# Patient Record
Sex: MALE | Race: WHITE | Employment: FULL TIME | URBAN - METROPOLITAN AREA
[De-identification: names, ages, dates, MRNs, and addresses within clinical notes are randomized per-mention and may not be internally consistent; named-entity substitution may affect disease eponyms.]

---

## 2017-05-22 DIAGNOSIS — F33.1 MAJOR DEPRESSIVE DISORDER, RECURRENT EPISODE, MODERATE (H): ICD-10-CM

## 2017-05-23 NOTE — TELEPHONE ENCOUNTER
Wellbutrin XL        Last Written Prescription Date: 08/17/2016  Last Fill Quantity: 90; # refills: 1  Last Office Visit with [facility], [facility] or [facility] prescribing provider:  11/07/2016        Last PHQ-9 score on record=   PHQ-9 SCORE 8/17/2016   Total Score -   Total Score 4       No results found for: AST  No results found for: ALT    Zoloft     Last Written Prescription Date: 08/17/2016  Last Fill Quantity: 90, # refills: 1  Last Office Visit with [facility] primary care provider:  11/07/2016        Last PHQ-9 score on record=   PHQ-9 SCORE 8/17/2016   Total Score -   Total Score 4

## 2017-05-26 RX ORDER — BUPROPION HYDROCHLORIDE 150 MG/1
TABLET ORAL
Qty: 30 TABLET | Refills: 0 | Status: SHIPPED | OUTPATIENT
Start: 2017-05-26 | End: 2017-07-07

## 2017-05-26 RX ORDER — SERTRALINE HYDROCHLORIDE 100 MG/1
TABLET, FILM COATED ORAL
Qty: 30 TABLET | Refills: 0 | Status: SHIPPED | OUTPATIENT
Start: 2017-05-26 | End: 2017-07-05

## 2017-05-26 NOTE — TELEPHONE ENCOUNTER
CW,  Left  #2 for patient   See below messages  No response from our outreach  Please advise on refill  Shu ALEXANDER RN

## 2017-06-09 DIAGNOSIS — G47.33 OSA (OBSTRUCTIVE SLEEP APNEA): Primary | ICD-10-CM

## 2017-07-05 DIAGNOSIS — F33.1 MAJOR DEPRESSIVE DISORDER, RECURRENT EPISODE, MODERATE (H): ICD-10-CM

## 2017-07-05 RX ORDER — BUPROPION HYDROCHLORIDE 150 MG/1
TABLET ORAL
Qty: 30 TABLET | Refills: 0 | Status: CANCELLED | OUTPATIENT
Start: 2017-07-05

## 2017-07-05 NOTE — TELEPHONE ENCOUNTER
Zoloft      Last Written Prescription Date: 05/26/2017  Last Fill Quantity: 30, # refills: 0  Last Office Visit with [facility] primary care provider:  11/07/2016        Last PHQ-9 score on record=   PHQ-9 SCORE 8/17/2016   Total Score -   Total Score 4               Wellbutrin        Last Written Prescription Date: 05/26/2017  Last Fill Quantity: 30; # refills: 0  Last Office Visit with [facility], [facility] or [facility] prescribing provider:  11/07/2016        Last PHQ-9 score on record=   PHQ-9 SCORE 8/17/2016   Total Score -   Total Score 4       No results found for: AST  No results found for: ALT

## 2017-07-07 RX ORDER — SERTRALINE HYDROCHLORIDE 100 MG/1
TABLET, FILM COATED ORAL
Qty: 30 TABLET | Refills: 0 | Status: SHIPPED | OUTPATIENT
Start: 2017-07-07 | End: 2017-09-01

## 2017-07-07 RX ORDER — BUPROPION HYDROCHLORIDE 150 MG/1
TABLET ORAL
Qty: 30 TABLET | Refills: 0 | Status: SHIPPED | OUTPATIENT
Start: 2017-07-07 | End: 2017-09-01

## 2017-07-07 NOTE — TELEPHONE ENCOUNTER
CW,  Please see below messages  Left VM #2 for pt  Pt has not responded to our outreach  Please advise on further refill  Shu ALEXANDER RN

## 2017-09-01 DIAGNOSIS — F33.1 MAJOR DEPRESSIVE DISORDER, RECURRENT EPISODE, MODERATE (H): ICD-10-CM

## 2017-09-01 NOTE — TELEPHONE ENCOUNTER
Left non detailed VM for pt asking that they callback and schedule (physical with PCP)  Shu ALEXANDER RN    Pending Prescriptions:                       Disp   Refills    buPROPion (WELLBUTRIN XL) 150 MG 24 hr tab*30 tab*0        Sig: TAKE 1 TABLET (150 MG) BY MOUTH EVERY MORNING - NEEDS           APPOINTMENT    sertraline (ZOLOFT) 100 MG tablet [Pharmac*30 tab*0        Sig: TAKE 1 TABLET (100 MG) BY MOUTH DAILY - NEEDS           APPOINTMENT         Last Written Prescription Date: 7/7/2017  Last Fill Quantity: 30 days; # refills: 0  Last Office Visit with [facility], [facility] or [facility] prescribing provider:  11/7/2016 with SN        Last PHQ-9 score on record=   PHQ-9 SCORE 8/17/2016   Total Score -   Total Score 4       No results found for: AST  No results found for: ALT

## 2017-09-07 RX ORDER — SERTRALINE HYDROCHLORIDE 100 MG/1
TABLET, FILM COATED ORAL
Qty: 30 TABLET | Refills: 0 | Status: SHIPPED | OUTPATIENT
Start: 2017-09-07 | End: 2017-11-27

## 2017-09-07 RX ORDER — BUPROPION HYDROCHLORIDE 150 MG/1
TABLET ORAL
Qty: 30 TABLET | Refills: 0 | Status: SHIPPED | OUTPATIENT
Start: 2017-09-07 | End: 2017-11-27

## 2017-09-07 NOTE — TELEPHONE ENCOUNTER
CW,  Left  #2 for patient  See below messages  No response from patient to our outreach  Please advise on further refill  Thanks,  Shu ALEXANDER RN

## 2017-09-07 NOTE — TELEPHONE ENCOUNTER
Sent in #30 of each medication.  Way overdue for appt.  Appreciate trying to call him.  Would send in #20 of the rx if he still hasn't made appt with next request- then would refuse.  Thanks- CW

## 2017-11-27 DIAGNOSIS — F33.1 MAJOR DEPRESSIVE DISORDER, RECURRENT EPISODE, MODERATE (H): ICD-10-CM

## 2017-11-29 RX ORDER — SERTRALINE HYDROCHLORIDE 100 MG/1
TABLET, FILM COATED ORAL
Qty: 30 TABLET | Refills: 0 | Status: SHIPPED | OUTPATIENT
Start: 2017-11-29 | End: 2017-12-01

## 2017-11-29 RX ORDER — BUPROPION HYDROCHLORIDE 150 MG/1
TABLET ORAL
Qty: 30 TABLET | Refills: 0 | Status: SHIPPED | OUTPATIENT
Start: 2017-11-29 | End: 2017-12-01

## 2017-11-29 NOTE — TELEPHONE ENCOUNTER
Routing refill request to provider for review/approval because:  A break in medication - should have run out mid October  Is scheduled with you for Friday  Ok to address then?  hSu ALEXANDER RN    Requested Prescriptions   Pending Prescriptions Disp Refills     buPROPion (WELLBUTRIN XL) 150 MG 24 hr tablet [Pharmacy Med Name: BUPROPION HCL  MG TABLET] 30 tablet 0     Sig: TAKE 1 TABLET (150 MG) BY MOUTH EVERY MORNING - NEEDS APPOINTMENT    SSRIs Protocol Failed    11/27/2017 10:14 AM       Failed - PHQ-9 score less than 5 in past 6 months    Please review PHQ-9 score.          Failed - Medication is NOT Bupropion    If the medication is Bupropion (Wellbutrin), and the patient is taking for smoking cessation; OK to refill.         Failed - Recent (6 mo) or future visit with authorizing provider's specialty    Patient had office visit in the last 6 months or has a visit in the next 30 days with authorizing provider.  See chart review.            Passed - Patient is age 18 or older        sertraline (ZOLOFT) 100 MG tablet [Pharmacy Med Name: SERTRALINE  MG TABLET] 30 tablet 0     Sig: TAKE 1 TABLET (100 MG) BY MOUTH DAILY - NEEDS APPOINTMENT    SSRIs Protocol Failed    11/27/2017 10:14 AM       Failed - PHQ-9 score less than 5 in past 6 months    Please review PHQ-9 score.          Failed - Medication is NOT Bupropion    If the medication is Bupropion (Wellbutrin), and the patient is taking for smoking cessation; OK to refill.         Failed - Recent (6 mo) or future visit with authorizing provider's specialty    Patient had office visit in the last 6 months or has a visit in the next 30 days with authorizing provider.  See chart review.            Passed - Patient is age 18 or older        Next 5 appointments (look out 90 days)     Dec 01, 2017  2:00 PM CST   PHYSICAL with Neida Benz MD   [facility]    [facility] [id number]    [phone]

## 2017-12-01 ENCOUNTER — OFFICE VISIT (OUTPATIENT)
Dept: FAMILY MEDICINE | Facility: CLINIC | Age: 42
End: 2017-12-01
Payer: COMMERCIAL

## 2017-12-01 VITALS
BODY MASS INDEX: 33.54 KG/M2 | HEART RATE: 89 BPM | WEIGHT: 247.6 LBS | OXYGEN SATURATION: 97 % | SYSTOLIC BLOOD PRESSURE: 143 MMHG | HEIGHT: 72 IN | DIASTOLIC BLOOD PRESSURE: 82 MMHG | TEMPERATURE: 97.4 F

## 2017-12-01 DIAGNOSIS — F33.1 MAJOR DEPRESSIVE DISORDER, RECURRENT EPISODE, MODERATE (H): ICD-10-CM

## 2017-12-01 DIAGNOSIS — N52.2 DRUG-INDUCED ERECTILE DYSFUNCTION: ICD-10-CM

## 2017-12-01 DIAGNOSIS — Z13.6 CARDIOVASCULAR SCREENING; LDL GOAL LESS THAN 160: ICD-10-CM

## 2017-12-01 DIAGNOSIS — Z23 FLU VACCINE NEED: ICD-10-CM

## 2017-12-01 DIAGNOSIS — G47.33 OSA (OBSTRUCTIVE SLEEP APNEA): ICD-10-CM

## 2017-12-01 DIAGNOSIS — R03.0 ELEVATED BLOOD PRESSURE READING WITHOUT DIAGNOSIS OF HYPERTENSION: ICD-10-CM

## 2017-12-01 DIAGNOSIS — Z00.00 ENCOUNTER FOR ROUTINE ADULT HEALTH EXAMINATION WITHOUT ABNORMAL FINDINGS: Primary | ICD-10-CM

## 2017-12-01 PROCEDURE — 90686 IIV4 VACC NO PRSV 0.5 ML IM: CPT | Performed by: FAMILY MEDICINE

## 2017-12-01 PROCEDURE — 90471 IMMUNIZATION ADMIN: CPT | Performed by: FAMILY MEDICINE

## 2017-12-01 PROCEDURE — 99396 PREV VISIT EST AGE 40-64: CPT | Mod: 25 | Performed by: FAMILY MEDICINE

## 2017-12-01 RX ORDER — BUPROPION HYDROCHLORIDE 150 MG/1
TABLET ORAL
Qty: 90 TABLET | Refills: 1 | Status: SHIPPED | OUTPATIENT
Start: 2017-12-01 | End: 2018-12-19

## 2017-12-01 RX ORDER — SERTRALINE HYDROCHLORIDE 100 MG/1
100 TABLET, FILM COATED ORAL DAILY
Qty: 90 TABLET | Refills: 1 | Status: SHIPPED | OUTPATIENT
Start: 2017-12-01 | End: 2018-11-14

## 2017-12-01 ASSESSMENT — PATIENT HEALTH QUESTIONNAIRE - PHQ9: SUM OF ALL RESPONSES TO PHQ QUESTIONS 1-9: 9

## 2017-12-01 NOTE — MR AVS SNAPSHOT
After Visit Summary   12/1/2017    Vahe Haas    MRN: [id number]           Patient Information     Date Of Birth          1975        Visit Information        Provider Department      12/1/2017 2:00 PM Neida Benz MD [facility]        Today's Diagnoses     Encounter for routine adult health examination without abnormal findings    -  1    Major depressive disorder, recurrent episode, moderate (H)        Flu vaccine need        Moderate major depression        Elevated blood pressure reading without diagnosis of hypertension        CARDIOVASCULAR SCREENING; LDL GOAL LESS THAN 160        Drug-induced erectile dysfunction        JNAA (obstructive sleep apnea)          Care Instructions      Preventive Health Recommendations  Male Ages 40 to 49    Yearly exam:             See your health care provider every year in order to  o   Review health changes.   o   Discuss preventive care.    o   Review your medicines if your doctor has prescribed any.    You should be tested each year for STDs (sexually transmitted diseases) if you re at risk.     Have a cholesterol test every 5 years.     Have a colonoscopy (test for colon cancer) if someone in your family has had colon cancer or polyps before age 50.     After age 45, have a diabetes test (fasting glucose). If you are at risk for diabetes, you should have this test every 3 years.      Talk with your health care provider about whether or not a prostate cancer screening test (PSA) is right for you.    Shots: Get a flu shot each year. Get a tetanus shot every 10 years.     Nutrition:    Eat at least 5 servings of fruits and vegetables daily.     Eat whole-grain bread, whole-wheat pasta and brown rice instead of white grains and rice.     Talk to your provider about Calcium and Vitamin D.     Lifestyle    Exercise for at least 150 minutes a week (30 minutes a day, 5 days a week). This will help you control your weight and prevent  disease.     Limit alcohol to one drink per day.     No smoking.     Wear sunscreen to prevent skin cancer.     See your dentist every six months for an exam and cleaning.              Follow-ups after your visit        Future tests that were ordered for you today     Open Future Orders        Priority Expected Expires Ordered    Lipid panel reflex to direct LDL Fasting Routine 6/1/2018 11/1/2018 12/1/2017    Basic metabolic panel  (Ca, Cl, CO2, Creat, Gluc, K, Na, BUN) Routine 6/1/2018 11/1/2018 12/1/2017    Albumin Random Urine Quantitative with Creat Ratio Routine 6/1/2018 11/1/2018 12/1/2017            Who to contact     If you have questions or need follow up information about today's clinic visit or your schedule please contact [facility] directly at [phone].  Normal or non-critical lab and imaging results will be communicated to you by [employer], letter or phone within 4 business days after the clinic has received the results. If you do not hear from us within 7 days, please contact the clinic through [employer] or phone. If you have a critical or abnormal lab result, we will notify you by phone as soon as possible.  Submit refill requests through [employer] or call your pharmacy and they will forward the refill request to us. Please allow 3 business days for your refill to be completed.          Additional Information About Your Visit        [employer] Information     [employer] gives you secure access to your electronic health record. If you see a primary care provider, you can also send messages to your care team and make appointments. If you have questions, please call your primary care clinic.  If you do not have a primary care provider, please call [phone] and they will assist you.        Care EveryWhere ID     This is your Care EveryWhere ID. This could be used by other organizations to access your [facility] medical records  [id number]        Your Vitals Were     Pulse Temperature Height Pulse  Oximetry BMI (Body Mass Index)       89 97.4  F (36.3  C) (Oral) 6' (1.829 m) 97% 33.58 kg/m2        Blood Pressure from Last 3 Encounters:   12/01/17 143/82   11/07/16 128/74   08/17/16 116/88    Weight from Last 3 Encounters:   12/01/17 247 lb 9.6 oz (112.3 kg)   11/07/16 244 lb 8 oz (110.9 kg)   08/17/16 233 lb (105.7 kg)              We Performed the Following     HC FLU VAC PRESRV FREE QUAD SPLIT VIR 3+YRS IM          Today's Medication Changes          These changes are accurate as of: 12/1/17  2:40 PM.  If you have any questions, ask your nurse or doctor.               These medicines have changed or have updated prescriptions.        Dose/Directions    buPROPion 150 MG 24 hr tablet   Commonly known as:  WELLBUTRIN XL   This may have changed:  See the new instructions.   Used for:  Major depressive disorder, recurrent episode, moderate (H)   Changed by:  Neida Benz MD        TAKE 1 TABLET (150 MG) BY MOUTH EVERY MORNING - NEEDS APPOINTMENT   Quantity:  90 tablet   Refills:  1       sertraline 100 MG tablet   Commonly known as:  ZOLOFT   This may have changed:  See the new instructions.   Used for:  Major depressive disorder, recurrent episode, moderate (H)   Changed by:  Neida Benz MD        Dose:  100 mg   Take 1 tablet (100 mg) by mouth daily   Quantity:  90 tablet   Refills:  1            Where to get your medicines      These medications were sent to [place] [address] [facility] [place] - [address]  [address] [place] [address]     Phone:  [phone]     buPROPion 150 MG 24 hr tablet    sertraline 100 MG tablet                Primary Care Provider Office Phone # Fax #    Neida Benz [phone] [address] [address]        Equal Access to Services     [facility] KARENA : Demetria Borjas, wavelda luqadaha, qaybta kaalsnehal avila. [facility] [phone].    ATENCIÓN:  Si habla lisa, tiene a alvarado disposición servicios gratuitos de asistencia lingüística. Abel noyola [phone].    We comply with applicable federal civil rights laws and [place] laws. We do not discriminate on the basis of race, color, national origin, age, disability, sex, sexual orientation, or gender identity.            Thank you!     Thank you for choosing [facility]  for your care. Our goal is always to provide you with excellent care. Hearing back from our patients is one way we can continue to improve our services. Please take a few minutes to complete the written survey that you may receive in the mail after your visit with us. Thank you!             Your Updated Medication List - Protect others around you: Learn how to safely use, store and throw away your medicines at www.disposemymeds.org.          This list is accurate as of: 12/1/17  2:40 PM.  Always use your most recent med list.                   Brand Name Dispense Instructions for use Diagnosis    ALLERGEN IMMUNOTHERAPY PRESCRIPTION           buPROPion 150 MG 24 hr tablet    WELLBUTRIN XL    90 tablet    TAKE 1 TABLET (150 MG) BY MOUTH EVERY MORNING - NEEDS APPOINTMENT    Major depressive disorder, recurrent episode, moderate (H)       FLONASE 50 MCG/ACT spray   Generic drug:  fluticasone      Spray 2 sprays into both nostrils as needed.        order for DME      AIRSENSE 10 7-12 CM/H20 NASAL WISP        sertraline 100 MG tablet    ZOLOFT    90 tablet    Take 1 tablet (100 mg) by mouth daily    Major depressive disorder, recurrent episode, moderate (H)       tadalafil 10 MG tablet    CIALIS    12 tablet    Take 0.5-1 tablets (5-10 mg) by mouth daily as needed for erectile dysfunction Never use with nitroglycerin, terazosin or doxazosin.    Drug-induced erectile dysfunction

## 2017-12-01 NOTE — PROGRESS NOTES
SUBJECTIVE:   CC: Vahe Haas is an 42 year old male who presents for preventative health visit.     Healthy Habits:    Do you get at least three servings of calcium containing foods daily (dairy, green leafy vegetables, etc.)? yes    Amount of exercise or daily activities, outside of work: none    Problems taking medications regularly No    Medication side effects: Yes - sexual se's    Have you had an eye exam in the past two years? no    Do you see a dentist twice per year? yes    Do you have sleep apnea, excessive snoring or daytime drowsiness?yes    Stressed lately-   MIL went went in for brain surgery- brain aneurysm operation- 9 hr surgery.   That was ~3 wks ago, doing better now.    Wife got new job, really likes it, and got a 50% raise.  His work- project he's worked on x 3 yrs, went live this morning, stressful.   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  Very much likes his job overall.    MDD- PHQ-9 is 9 today.  Likely worse due to recent stressors, but feels like the wellbutrin XL 150mg/d and zoloft 100mg/d are working well, no se's.  Would like to continue on both at same doses.    PHQ-9 SCORE 2/17/2016 8/17/2016 12/1/2017   Total Score - - -   Total Score 6 4 9     Weight- going up, ~30 lbs in the past 2-3 yrs.    --Pt states that in '15, around time that work moved, and now has less movement during day- sitting next to people now, less walking to talk to people, and used to climb 8 flights of stairs to work- now just one level.  --Zoloft started in 6/13- wt up - wt hovered around 215 lbs until 5/15, so doubtful wt gain is from the zoloft.  --Diet- doesn't think it's changed much.  Lots of skipping meals (breakfast and lunch), going out for dinner.  Sometimes skips lunch, then goes home and eats a dinner.  Doesn't snack much.  Breakfast- coffee  Lunch- often skips  Dinner- usually eats out. 2x/wk- teo, convention grill, or make pasta, or order chinese food (lo mein).    Snacks- rare- not really at work or  in evening.      Today's PHQ-2 Score:   PHQ-2 ( 1999 Pfizer) 12/1/2017 11/7/2016   Q1: Little interest or pleasure in doing things 1 0   Q2: Feeling down, depressed or hopeless 1 0   PHQ-2 Score 2 0     Abuse: Current or Past(Physical, Sexual or Emotional)- No  Do you feel safe in your environment - Yes    Social History   Substance Use Topics     Smoking status: Never Smoker     Smokeless tobacco: Never Used     Alcohol use 0.0 oz/week     0 Standard drinks or equivalent per week      Comment: social, 0-2 times per week     2 beers a week    Last PSA: No results found for: PSA    Reviewed orders with patient. Reviewed health maintenance and updated orders accordingly - Yes  Labs reviewed in EPIC  BP Readings from Last 3 Encounters:   12/01/17 143/82   11/07/16 128/74   08/17/16 116/88    Wt Readings from Last 3 Encounters:   12/01/17 247 lb 9.6 oz (112.3 kg)   11/07/16 244 lb 8 oz (110.9 kg)   08/17/16 233 lb (105.7 kg)                  Patient Active Problem List   Diagnosis     Allergic rhinitis     Insomnia     CARDIOVASCULAR SCREENING; LDL GOAL LESS THAN 160     Moderate major depression     Drug-induced erectile dysfunction     JANA (obstructive sleep apnea)     Acute bilateral thoracic back pain     Acute bilateral low back pain without sciatica     Past Surgical History:   Procedure Laterality Date     HC TOOTH EXTRACTION W/FORCEP  2003       Social History   Substance Use Topics     Smoking status: Never Smoker     Smokeless tobacco: Never Used     Alcohol use 0.0 oz/week     0 Standard drinks or equivalent per week      Comment: social, 0-2 times per week     Family History   Problem Relation Age of Onset     Hypertension Mother      Hypertension Maternal Grandmother      Hypertension Paternal Grandfather      CEREBROVASCULAR DISEASE Maternal Grandmother      Breast Cancer Paternal Grandmother      Allergies Mother      Allergies Father      Allergies Sister      Allergies Maternal Grandmother       Arthritis Maternal Grandmother      Depression Paternal Grandfather      Depression Mother      GASTROINTESTINAL DISEASE Paternal Grandfather      ulcers     Lipids Mother      Lipids Father      Lipids Maternal Grandmother      Lipids Paternal Grandfather      Musculoskeletal Disorder Mother      OSTEOPOROSIS Mother      OSTEOPOROSIS Maternal Grandmother      Respiratory Mother      asthma     Respiratory Father      asthma     Respiratory Sister      asthma     Thyroid Disease Sister          Current Outpatient Prescriptions   Medication Sig Dispense Refill     buPROPion (WELLBUTRIN XL) 150 MG 24 hr tablet TAKE 1 TABLET (150 MG) BY MOUTH EVERY MORNING - NEEDS APPOINTMENT 90 tablet 1     sertraline (ZOLOFT) 100 MG tablet Take 1 tablet (100 mg) by mouth daily 90 tablet 1     order for DME AIRSENSE 10  7-12 CM/H20  NASAL WISP       tadalafil (CIALIS) 10 MG tablet Take 0.5-1 tablets (5-10 mg) by mouth daily as needed for erectile dysfunction Never use with nitroglycerin, terazosin or doxazosin. 12 tablet 3     fluticasone (FLONASE) 50 MCG/ACT nasal spray Spray 2 sprays into both nostrils as needed.       ALLERGY SHOTS        Allergies   Allergen Reactions     Seasonal Allergies      Recent Labs   Lab Test  02/17/16   1202  02/13/13   1116  03/16/12   1113  12/23/10   1442   LDL  124*  122  145*   --    HDL  30*  29*  34*   --    TRIG  167*  98  67   --    CR  1.02  1.03   --    --    GFRESTIMATED  81  82   --    --    GFRESTBLACK  >90   GFR Calc    >90   --    --    POTASSIUM  3.9  3.9   --    --    TSH   --    --    --   1.19        Reviewed and updated as needed this visit by clinical staffTobacco  Allergies  Meds  Problems         Reviewed and updated as needed this visit by Provider  Allergies  Meds  Problems              ROS:  C: NEGATIVE for fever, chills, change in weight  I: NEGATIVE for worrisome rashes, moles or lesions  E: NEGATIVE for vision changes or irritation  ENT: NEGATIVE for  ear, mouth and throat problems  R: NEGATIVE for significant cough or SOB  CV: NEGATIVE for chest pain, palpitations or peripheral edema  GI: NEGATIVE for nausea, abdominal pain, heartburn, or change in bowel habits   male: negative for dysuria, hematuria, decreased urinary stream, erectile dysfunction, urethral discharge  M: NEGATIVE for significant arthralgias or myalgia  N: NEGATIVE for weakness, dizziness or paresthesias  P: NEGATIVE for changes in mood or affect    OBJECTIVE:   /82  Pulse 89  Temp 97.4  F (36.3  C) (Oral)  Ht 6' (1.829 m)  Wt 247 lb 9.6 oz (112.3 kg)  SpO2 97%  BMI 33.58 kg/m2   134/84  EXAM:  GENERAL: healthy, alert and no distress  EYES: Eyes grossly normal to inspection, PERRL and conjunctivae and sclerae normal  HENT: ear canals and TM's normal, nose and mouth without ulcers or lesions  NECK: no adenopathy, no asymmetry, masses, or scars and thyroid normal to palpation  RESP: lungs clear to auscultation - no rales, rhonchi or wheezes  BREAST: normal without masses, tenderness or nipple discharge and no palpable axillary masses or adenopathy  CV: regular rate and rhythm, normal S1 S2, no S3 or S4, no murmur, click or rub, no peripheral edema and peripheral pulses strong  ABDOMEN: soft, nontender, no hepatosplenomegaly, no masses and bowel sounds normal  MS: no gross musculoskeletal defects noted, no edema  SKIN: no suspicious lesions or rashes  NEURO: Normal strength and tone, mentation intact and speech normal  PSYCH: mentation appears normal, affect normal/bright    ASSESSMENT/PLAN:       ICD-10-CM    1. Encounter for routine adult health examination without abnormal findings Z00.00    2. Major depressive disorder, recurrent episode, moderate (H) F33.1 buPROPion (WELLBUTRIN XL) 150 MG 24 hr tablet     sertraline (ZOLOFT) 100 MG tablet   3. BMI 33.0-33.9,adult Z68.33    4. Elevated blood pressure reading without diagnosis of hypertension R03.0 Basic metabolic panel  (Ca, Cl,  CO2, Creat, Gluc, K, Na, BUN)     Albumin Random Urine Quantitative with Creat Ratio   5. JANA (obstructive sleep apnea) G47.33    6. Drug-induced erectile dysfunction N52.2    7. Flu vaccine need Z23 HC FLU VAC PRESRV FREE QUAD SPLIT VIR 3+YRS IM   8. CARDIOVASCULAR SCREENING; LDL GOAL LESS THAN 160 Z13.6 Lipid panel reflex to direct LDL Fasting     CPE- Discussed diet, calcium and exercise.  Eyes and Teeth or UTD or recommended f/u.  Flu vaccine immunizations needed today.  See orders below for tests ordered and screening needed.      MDD- sx's bit worse due to stressors, but overall feels like his meds are very helpful, no se's.  Will cont on wellbutrin XL and zoloft- refills sent.  Cont q6mo f/u.    BMI 33, elevated HTN- long discussion about diet/exercise, salt intake.  Brainstormed ways to incorporate more activity and more vegetables/fruits into his diet, more home meals.  Check BP outside office, rtc if systolics >135 consistently.  Work on above, and f/u at 6mo appt.  Labs as above.    JANA- cont CPAP.        COUNSELING:  Reviewed preventive health counseling, as reflected in patient instructions    BP Screening:   Last 3 BP Readings:    BP Readings from Last 3 Encounters:   12/01/17 143/82   11/07/16 128/74   08/17/16 116/88       The following was recommended to the patient:  Re-screen within 4 weeks and recommend lifestyle modifications       reports that he has never smoked. He has never used smokeless tobacco.      Estimated body mass index is 33.58 kg/(m^2) as calculated from the following:    Height as of this encounter: 6' (1.829 m).    Weight as of this encounter: 247 lb 9.6 oz (112.3 kg).   Weight management plan: Cont work on diet/exercise as above    Counseling Resources:  ATP IV Guidelines  Pooled Cohorts Equation Calculator  FRAX Risk Assessment  ICSI Preventive Guidelines  Dietary Guidelines for Americans, 2010  USDA's MyPlate  ASA Prophylaxis  Lung CA Screening    Neida Benz,  MD  [facility]

## 2018-02-08 DIAGNOSIS — F33.1 MAJOR DEPRESSIVE DISORDER, RECURRENT EPISODE, MODERATE (H): ICD-10-CM

## 2018-02-08 RX ORDER — BUPROPION HYDROCHLORIDE 150 MG/1
TABLET ORAL
Start: 2018-02-08

## 2018-02-08 NOTE — TELEPHONE ENCOUNTER
"Denied   Refill request too early; Rx sent 12/1/2017 for 6 months  Shu ALEXANDER RN    Requested Prescriptions   Pending Prescriptions Disp Refills     buPROPion (WELLBUTRIN XL) 150 MG 24 hr tablet [Pharmacy Med Name: BUPROPION HCL  MG TABLET] 30 tablet 0     Sig: TAKE 1 TABLET BY MOUTH EVERY MORNING. APPOINTMENT NEEDED FOR FURTHER REFILLS.    SSRIs Protocol Failed    2/8/2018  2:25 PM       Failed - PHQ-9 score less than 5 in past 6 months    The PHQ-9 criteria is meant to fail. It requires a PHQ-9 score review         Passed - Medication is Bupropion    If the medication is Bupropion (Wellbutrin), and the patient is taking for smoking cessation; OK to refill.         Passed - Patient is age 18 or older       Passed - Recent (6 mo) or future visit with authorizing provider's specialty    Patient had office visit in the last 6 months or has a visit in the next 30 days with authorizing provider.  See \"Patient Info\" tab in inbasket, or \"Choose Columns\" in Meds & Orders section of the refill encounter.                "

## 2018-06-18 ENCOUNTER — OFFICE VISIT (OUTPATIENT)
Dept: SLEEP MEDICINE | Facility: CLINIC | Age: 43
End: 2018-06-18
Payer: COMMERCIAL

## 2018-06-18 VITALS
OXYGEN SATURATION: 96 % | BODY MASS INDEX: 32.5 KG/M2 | DIASTOLIC BLOOD PRESSURE: 77 MMHG | WEIGHT: 245.2 LBS | RESPIRATION RATE: 16 BRPM | HEIGHT: 73 IN | HEART RATE: 72 BPM | SYSTOLIC BLOOD PRESSURE: 121 MMHG

## 2018-06-18 DIAGNOSIS — G47.33 OSA (OBSTRUCTIVE SLEEP APNEA): Primary | ICD-10-CM

## 2018-06-18 PROCEDURE — 99214 OFFICE O/P EST MOD 30 MIN: CPT | Performed by: PHYSICIAN ASSISTANT

## 2018-06-18 NOTE — NURSING NOTE
"Chief Complaint   Patient presents with     CPAP Follow Up       Initial /77  Pulse 72  Resp 16  Ht 1.842 m (6' 0.5\")  Wt 111.2 kg (245 lb 3.2 oz)  SpO2 96%  BMI 32.8 kg/m2 Estimated body mass index is 32.8 kg/(m^2) as calculated from the following:    Height as of this encounter: 1.842 m (6' 0.5\").    Weight as of this encounter: 111.2 kg (245 lb 3.2 oz).    Medication Reconciliation: complete     ESS   Reena Talbot        "

## 2018-06-18 NOTE — PROGRESS NOTES
CPAP Follow-Up Visit:    Date on this visit: 6/18/2018    Vahe Haas comes in today for follow-up of his treatment for severe JANA. He was initially seen at the [facility] for twitching and snoring that is bothering his wife for years. He was referred for concerns of insomnia. His medical history is significant for depression and allergic rhinitis. He has a history of seizures in his early 20's.     PSG on 5/10/2016 showed: AHI was 41.5/hr, with desaturations down to 87%. He spent 1.6 minutes below 90% SpO2 and 0.6 minutes below 89%. RDI 41.5/hr.  REM RDI N/A.  Supine RDI N/A.  Periodic Limb Movement Index 63/hour, 6.5/hr were associated with arousals. The PLM indices were essentially unchanged on CPAP. His arousal index was 71.7/hr on the baseline (42/hr were spontaneous). On CPAP, his arousal index was 29/hr, 20/hr were spontaneous.   He is on auto CPAP 7-12 cm. His wife says he is not getting a good seal if he does not shave basically every other day. He would like to try a nasal pillows mask. He continues to get allergy shots and that has been helping a lot. He is snoring a little with CPAP. He is not sure if that is mostly when the mask is leaking. He does not recall waking with a dry mouth. He does use the humidifier and the tank lasts 2 days. He is comfortable with the pressures.   The compliance data shows that he has used the CPAP for 30/30 nights, 93% of nights for >4 hours.  The 95th% pressure is 10.5 cm.  The 95th% leak is 5 lpm.  The average nightly usage is 6:34.  The average AHI is 0.4/hr.    He weighed 230 at his initial visit and now weighs 245.  His dogs wake him in the morning around 6:30 AM. Bedtime is 10 PM to midnight (usually closer to midnight).  He is not allowed to nap.     Past medical/surgical history, family history, social history, medications and allergies were reviewed.      Problem List:  Patient Active Problem List    Diagnosis Date Noted     Acute bilateral  thoracic back pain 11/08/2016     Priority: Medium     Acute bilateral low back pain without sciatica 11/08/2016     Priority: Medium     JANA (obstructive sleep apnea) 05/24/2016     Priority: Medium     CPAP started in ~6/16- very helpful, sleeps better, feels rested, doesn't snore.       Drug-induced erectile dysfunction 11/13/2015     Priority: Medium     Viagra not helpful.  Cialis more helpful (tried once), but expensive.       Moderate major depression 02/24/2011     Priority: Medium     1st dx with MDD in '09.  Mother has MDD.  Started on citalopram in '09, but felt dulled, off, libido issues.  Switched to wellbutrin in 12/10 with improvement in mood, less se's.  Does have h/o sz's, but none since early 20s- none on wellbutrin.  Switched from wellbutrin to zoloft 100mg in 6/13.  Sexual se's, so will try adding back low-dose wellbutrin (50mg/d- 7/13).  '15- zoloft 100mg/d.  Added the wellbutrin XL 150mg/d in 10/15.  Thinks it's helpful- more level.         CARDIOVASCULAR SCREENING; LDL GOAL LESS THAN 160 10/31/2010     Priority: Medium     Insomnia 05/22/2007     Priority: Medium     Improved with exercise.         Allergic rhinitis      Priority: Medium     Allergic rhinitis.  Has been doing allergy shots since '12, helping.   Problem list name updated by automated process. Provider to review          Impression/Plan:  (G47.33) JANA (obstructive sleep apnea)  (primary encounter diagnosis)  Comment: Mr. Haas presents to get a prescription for a nasal pillows mask. He has a nasal mask and his wife states that it leaks a lot because of his beard. He is doing well with CPAP otherwise. His sleep is a little short due to going to bed late and his dog waking him in the morning. He has a history of insomnia, so being somewhat sleep restricted may help his sleep quality.  Plan: Comprehensive DME        Continue auto CPAP 7-12 cm. A prescription was written for new supplies. Try to get to bed earlier more  consistently.      He will follow up with me in about 2 year(s).     Twenty-five minutes spent with patient, all of which were spent face-to-face counseling, consulting, coordinating plan of care.      Bennett Goltz, PA-C    CC: No ref. provider found

## 2018-06-18 NOTE — PATIENT INSTRUCTIONS

## 2018-06-18 NOTE — MR AVS SNAPSHOT
After Visit Summary   6/18/2018    Vahe Haas    MRN: [id number]           Patient Information     Date Of Birth          1975        Visit Information        Provider Department      6/18/2018 2:00 PM Goltz, Bennett Ezra, PA-C [facility]        Today's Diagnoses     JANA (obstructive sleep apnea)    -  1      Care Instructions      Your BMI is Body mass index is 32.8 kg/(m^2).  Weight management is a personal decision.  If you are interested in exploring weight loss strategies, the following discussion covers the approaches that may be successful. Body mass index (BMI) is one way to tell whether you are at a healthy weight, overweight, or obese. It measures your weight in relation to your height.  A BMI of 18.5 to 24.9 is in the healthy range. A person with a BMI of 25 to 29.9 is considered overweight, and someone with a BMI of 30 or greater is considered obese. More than two-thirds of American adults are considered overweight or obese.  Being overweight or obese increases the risk for further weight gain. Excess weight may lead to heart disease and diabetes.  Creating and following plans for healthy eating and physical activity may help you improve your health.  Weight control is part of healthy lifestyle and includes exercise, emotional health, and healthy eating habits. Careful eating habits lifelong are the mainstay of weight control. Though there are significant health benefits from weight loss, long-term weight loss with diet alone may be very difficult to achieve- studies show long-term success with dietary management in less than 10% of people. Attaining a healthy weight may be especially difficult to achieve in those with severe obesity. In some cases, medications, devices and surgical management might be considered.  What can you do?  If you are overweight or obese and are interested in methods for weight loss, you should discuss this with your provider.      Consider reducing daily calorie intake by 500 calories.     Keep a food journal.     Avoiding skipping meals, consider cutting portions instead.    Diet combined with exercise helps maintain muscle while optimizing fat loss. Strength training is particularly important for building and maintaining muscle mass. Exercise helps reduce stress, increase energy, and improves fitness. Increasing exercise without diet control, however, may not burn enough calories to loose weight.       Start walking three days a week 10-20 minutes at a time    Work towards walking thirty minutes five days a week     Eventually, increase the speed of your walking for 1-2 minutes at time    In addition, we recommend that you review healthy lifestyles and methods for weight loss available through the National Institutes of Health patient information sites:  http://win.niddk.nih.gov/publications/index.htm    And look into health and wellness programs that may be available through your health insurance provider, employer, local community center, or arya club.    Weight management plan: Patient was referred to their PCP to discuss a diet and exercise plan.            Follow-ups after your visit        Follow-up notes from your care team     Return in about 2 years (around 6/18/2020) for CPAP compliance recheck.      Who to contact     If you have questions or need follow up information about today's clinic visit or your schedule please contact [facility] directly at [phone].  Normal or non-critical lab and imaging results will be communicated to you by MyChart, letter or phone within 4 business days after the clinic has received the results. If you do not hear from us within 7 days, please contact the clinic through [employer] or phone. If you have a critical or abnormal lab result, we will notify you by phone as soon as possible.  Submit refill requests through [employer] or call your pharmacy and they will forward the  "refill request to us. Please allow 3 business days for your refill to be completed.          Additional Information About Your Visit        [facility] Information     [employer] gives you secure access to your electronic health record. If you see a primary care provider, you can also send messages to your care team and make appointments. If you have questions, please call your primary care clinic.  If you do not have a primary care provider, please call [phone] and they will assist you.        Care EveryWhere ID     This is your Care EveryWhere ID. This could be used by other organizations to access your [facility] medical records  [id number]        Your Vitals Were     Pulse Respirations Height Pulse Oximetry BMI (Body Mass Index)       72 16 1.842 m (6' 0.5\") 96% 32.8 kg/m2        Blood Pressure from Last 3 Encounters:   06/18/18 121/77   12/01/17 143/82   11/07/16 128/74    Weight from Last 3 Encounters:   06/18/18 111.2 kg (245 lb 3.2 oz)   12/01/17 112.3 kg (247 lb 9.6 oz)   11/07/16 110.9 kg (244 lb 8 oz)              We Performed the Following     Comprehensive DME        Primary Care Provider Office Phone # Fax #    Neida Benz [phone] [address] [address]        Equal Access to Services     JANELLE THURSTON : Hadii aad ku hadasho Sojuan joséali, waaxda luqadaha, qaybta kaalmada adeegyada, snehal medrano. So [facility] [phone].    ATENCIÓN: Si habla español, tiene a alvarado disposición servicios gratuitos de asistencia lingüística. Llvickie al [phone].    We comply with applicable federal civil rights laws and [place] laws. We do not discriminate on the basis of race, color, national origin, age, disability, sex, sexual orientation, or gender identity.            Thank you!     Thank you for choosing [facility]  for your care. Our goal is always to provide you with excellent care. Hearing back from our patients is " one way we can continue to improve our services. Please take a few minutes to complete the written survey that you may receive in the mail after your visit with us. Thank you!             Your Updated Medication List - Protect others around you: Learn how to safely use, store and throw away your medicines at www.disposemymeds.org.          This list is accurate as of 6/18/18  5:10 PM.  Always use your most recent med list.                   Brand Name Dispense Instructions for use Diagnosis    ALLERGEN IMMUNOTHERAPY PRESCRIPTION           buPROPion 150 MG 24 hr tablet    WELLBUTRIN XL    90 tablet    TAKE 1 TABLET (150 MG) BY MOUTH EVERY MORNING - NEEDS APPOINTMENT    Major depressive disorder, recurrent episode, moderate (H)       FLONASE 50 MCG/ACT spray   Generic drug:  fluticasone      Spray 2 sprays into both nostrils as needed.        order for DME      AIRSENSE 10 7-12 CM/H20 NASAL WISP        sertraline 100 MG tablet    ZOLOFT    90 tablet    Take 1 tablet (100 mg) by mouth daily    Major depressive disorder, recurrent episode, moderate (H)

## 2018-09-03 ENCOUNTER — APPOINTMENT (OUTPATIENT)
Dept: CT IMAGING | Facility: CLINIC | Age: 43
DRG: 299 | End: 2018-09-03
Attending: EMERGENCY MEDICINE
Payer: COMMERCIAL

## 2018-09-03 ENCOUNTER — APPOINTMENT (OUTPATIENT)
Dept: MRI IMAGING | Facility: CLINIC | Age: 43
DRG: 299 | End: 2018-09-03
Attending: PSYCHIATRY & NEUROLOGY
Payer: COMMERCIAL

## 2018-09-03 ENCOUNTER — APPOINTMENT (OUTPATIENT)
Dept: MRI IMAGING | Facility: CLINIC | Age: 43
DRG: 299 | End: 2018-09-03
Attending: HOSPITALIST
Payer: COMMERCIAL

## 2018-09-03 ENCOUNTER — APPOINTMENT (OUTPATIENT)
Dept: SPEECH THERAPY | Facility: CLINIC | Age: 43
DRG: 299 | End: 2018-09-03
Payer: COMMERCIAL

## 2018-09-03 ENCOUNTER — HOSPITAL ENCOUNTER (INPATIENT)
Facility: CLINIC | Age: 43
LOS: 3 days | Discharge: HOME | DRG: 299 | End: 2018-09-06
Attending: EMERGENCY MEDICINE | Admitting: HOSPITALIST
Payer: COMMERCIAL

## 2018-09-03 DIAGNOSIS — G44.89 OTHER HEADACHE SYNDROME: Primary | ICD-10-CM

## 2018-09-03 DIAGNOSIS — I77.74 VERTEBRAL ARTERY DISSECTION (H): ICD-10-CM

## 2018-09-03 DIAGNOSIS — I63.89 CEREBROVASCULAR ACCIDENT (CVA) DUE TO OTHER MECHANISM (H): ICD-10-CM

## 2018-09-03 DIAGNOSIS — K59.00 CONSTIPATION, UNSPECIFIED CONSTIPATION TYPE: ICD-10-CM

## 2018-09-03 LAB
ALBUMIN SERPL-MCNC: 3.7 G/DL (ref 3.4–5)
ALP SERPL-CCNC: 81 U/L (ref 40–150)
ALT SERPL W P-5'-P-CCNC: 29 U/L (ref 0–70)
ANION GAP SERPL CALCULATED.3IONS-SCNC: 9 MMOL/L (ref 3–14)
APPEARANCE CSF: CLEAR
AST SERPL W P-5'-P-CCNC: 22 U/L (ref 0–45)
BASOPHILS # BLD AUTO: 0 10E9/L (ref 0–0.2)
BASOPHILS NFR BLD AUTO: 0.3 %
BILIRUB DIRECT SERPL-MCNC: <0.1 MG/DL (ref 0–0.2)
BILIRUB SERPL-MCNC: 0.3 MG/DL (ref 0.2–1.3)
BUN SERPL-MCNC: 16 MG/DL (ref 7–30)
CALCIUM SERPL-MCNC: 8 MG/DL (ref 8.5–10.1)
CHLORIDE SERPL-SCNC: 109 MMOL/L (ref 94–109)
CO2 SERPL-SCNC: 25 MMOL/L (ref 20–32)
COLOR CSF: COLORLESS
CREAT SERPL-MCNC: 1.04 MG/DL (ref 0.66–1.25)
DIFFERENTIAL METHOD BLD: NORMAL
EOSINOPHIL # BLD AUTO: 0.3 10E9/L (ref 0–0.7)
EOSINOPHIL NFR BLD AUTO: 3.5 %
ERYTHROCYTE [DISTWIDTH] IN BLOOD BY AUTOMATED COUNT: 13.1 % (ref 10–15)
GFR SERPL CREATININE-BSD FRML MDRD: 78 ML/MIN/1.7M2
GLUCOSE BLDC GLUCOMTR-MCNC: 146 MG/DL (ref 70–99)
GLUCOSE CSF-MCNC: 63 MG/DL (ref 40–70)
GLUCOSE SERPL-MCNC: 158 MG/DL (ref 70–99)
GRAM STN SPEC: NORMAL
HBA1C MFR BLD: 5.6 % (ref 0–5.6)
HCT VFR BLD AUTO: 43.5 % (ref 40–53)
HGB BLD-MCNC: 15.4 G/DL (ref 13.3–17.7)
IMM GRANULOCYTES # BLD: 0 10E9/L (ref 0–0.4)
IMM GRANULOCYTES NFR BLD: 0.3 %
INTERPRETATION ECG - MUSE: NORMAL
LYMPH ABN NFR CSF MANUAL: 64 %
LYMPHOCYTES # BLD AUTO: 2.3 10E9/L (ref 0.8–5.3)
LYMPHOCYTES NFR BLD AUTO: 31.1 %
Lab: NORMAL
MCH RBC QN AUTO: 30.2 PG (ref 26.5–33)
MCHC RBC AUTO-ENTMCNC: 35.4 G/DL (ref 31.5–36.5)
MCV RBC AUTO: 85 FL (ref 78–100)
MONOCYTES # BLD AUTO: 0.5 10E9/L (ref 0–1.3)
MONOCYTES NFR BLD AUTO: 7.1 %
MONOS+MACROS NFR CSF MANUAL: 36 %
NEUTROPHILS # BLD AUTO: 4.3 10E9/L (ref 1.6–8.3)
NEUTROPHILS NFR BLD AUTO: 57.7 %
NRBC # BLD AUTO: 0 10*3/UL
NRBC BLD AUTO-RTO: 0 /100
PLATELET # BLD AUTO: 156 10E9/L (ref 150–450)
POTASSIUM SERPL-SCNC: 3.5 MMOL/L (ref 3.4–5.3)
PROT CSF-MCNC: 41 MG/DL (ref 15–60)
PROT SERPL-MCNC: 6.9 G/DL (ref 6.8–8.8)
RADIOLOGIST FLAGS: ABNORMAL
RBC # BLD AUTO: 5.1 10E12/L (ref 4.4–5.9)
RBC # CSF MANUAL: 8 /UL (ref 0–2)
SODIUM SERPL-SCNC: 143 MMOL/L (ref 133–144)
SPECIMEN SOURCE: NORMAL
TUBE # CSF: 4 #
WBC # BLD AUTO: 7.5 10E9/L (ref 4–11)
WBC # CSF MANUAL: 7 /UL (ref 0–5)

## 2018-09-03 PROCEDURE — 96376 TX/PRO/DX INJ SAME DRUG ADON: CPT

## 2018-09-03 PROCEDURE — 80048 BASIC METABOLIC PNL TOTAL CA: CPT | Performed by: EMERGENCY MEDICINE

## 2018-09-03 PROCEDURE — 70549 MR ANGIOGRAPH NECK W/O&W/DYE: CPT

## 2018-09-03 PROCEDURE — 93005 ELECTROCARDIOGRAM TRACING: CPT

## 2018-09-03 PROCEDURE — 00000146 ZZHCL STATISTIC GLUCOSE BY METER IP

## 2018-09-03 PROCEDURE — 25000128 H RX IP 250 OP 636: Performed by: HOSPITALIST

## 2018-09-03 PROCEDURE — 70553 MRI BRAIN STEM W/O & W/DYE: CPT

## 2018-09-03 PROCEDURE — 99253 IP/OBS CNSLTJ NEW/EST LOW 45: CPT | Performed by: PSYCHIATRY & NEUROLOGY

## 2018-09-03 PROCEDURE — 25000125 ZZHC RX 250: Performed by: EMERGENCY MEDICINE

## 2018-09-03 PROCEDURE — 99285 EMERGENCY DEPT VISIT HI MDM: CPT | Mod: 25

## 2018-09-03 PROCEDURE — 25000128 H RX IP 250 OP 636: Performed by: EMERGENCY MEDICINE

## 2018-09-03 PROCEDURE — 25000132 ZZH RX MED GY IP 250 OP 250 PS 637: Performed by: HOSPITALIST

## 2018-09-03 PROCEDURE — 83036 HEMOGLOBIN GLYCOSYLATED A1C: CPT | Performed by: EMERGENCY MEDICINE

## 2018-09-03 PROCEDURE — 89051 BODY FLUID CELL COUNT: CPT | Performed by: EMERGENCY MEDICINE

## 2018-09-03 PROCEDURE — 96375 TX/PRO/DX INJ NEW DRUG ADDON: CPT

## 2018-09-03 PROCEDURE — 70450 CT HEAD/BRAIN W/O DYE: CPT

## 2018-09-03 PROCEDURE — 70498 CT ANGIOGRAPHY NECK: CPT

## 2018-09-03 PROCEDURE — 40000225 ZZH STATISTIC SLP WARD VISIT: Performed by: SPEECH-LANGUAGE PATHOLOGIST

## 2018-09-03 PROCEDURE — 92610 EVALUATE SWALLOWING FUNCTION: CPT | Mod: GN | Performed by: SPEECH-LANGUAGE PATHOLOGIST

## 2018-09-03 PROCEDURE — 87205 SMEAR GRAM STAIN: CPT | Performed by: EMERGENCY MEDICINE

## 2018-09-03 PROCEDURE — A9585 GADOBUTROL INJECTION: HCPCS | Performed by: HOSPITALIST

## 2018-09-03 PROCEDURE — 84157 ASSAY OF PROTEIN OTHER: CPT | Performed by: EMERGENCY MEDICINE

## 2018-09-03 PROCEDURE — 80076 HEPATIC FUNCTION PANEL: CPT | Performed by: EMERGENCY MEDICINE

## 2018-09-03 PROCEDURE — 40000275 ZZH STATISTIC RCP TIME EA 10 MIN

## 2018-09-03 PROCEDURE — 009U3ZX DRAINAGE OF SPINAL CANAL, PERCUTANEOUS APPROACH, DIAGNOSTIC: ICD-10-PCS | Performed by: EMERGENCY MEDICINE

## 2018-09-03 PROCEDURE — 87070 CULTURE OTHR SPECIMN AEROBIC: CPT | Performed by: EMERGENCY MEDICINE

## 2018-09-03 PROCEDURE — 85025 COMPLETE CBC W/AUTO DIFF WBC: CPT | Performed by: EMERGENCY MEDICINE

## 2018-09-03 PROCEDURE — 92526 ORAL FUNCTION THERAPY: CPT | Mod: GN | Performed by: SPEECH-LANGUAGE PATHOLOGIST

## 2018-09-03 PROCEDURE — 96361 HYDRATE IV INFUSION ADD-ON: CPT

## 2018-09-03 PROCEDURE — 12000000 ZZH R&B MED SURG/OB

## 2018-09-03 PROCEDURE — 99223 1ST HOSP IP/OBS HIGH 75: CPT | Mod: AI | Performed by: HOSPITALIST

## 2018-09-03 PROCEDURE — 82945 GLUCOSE OTHER FLUID: CPT | Performed by: EMERGENCY MEDICINE

## 2018-09-03 PROCEDURE — 96374 THER/PROPH/DIAG INJ IV PUSH: CPT | Mod: 59

## 2018-09-03 RX ORDER — PROCHLORPERAZINE MALEATE 10 MG
10 TABLET ORAL EVERY 6 HOURS PRN
Status: DISCONTINUED | OUTPATIENT
Start: 2018-09-03 | End: 2018-09-06 | Stop reason: HOSPADM

## 2018-09-03 RX ORDER — AMOXICILLIN 250 MG
1 CAPSULE ORAL 2 TIMES DAILY PRN
Status: DISCONTINUED | OUTPATIENT
Start: 2018-09-03 | End: 2018-09-06 | Stop reason: HOSPADM

## 2018-09-03 RX ORDER — PROCHLORPERAZINE 25 MG
25 SUPPOSITORY, RECTAL RECTAL EVERY 12 HOURS PRN
Status: DISCONTINUED | OUTPATIENT
Start: 2018-09-03 | End: 2018-09-06 | Stop reason: HOSPADM

## 2018-09-03 RX ORDER — GADOBUTROL 604.72 MG/ML
10 INJECTION INTRAVENOUS ONCE
Status: COMPLETED | OUTPATIENT
Start: 2018-09-03 | End: 2018-09-03

## 2018-09-03 RX ORDER — KETOROLAC TROMETHAMINE 15 MG/ML
15 INJECTION, SOLUTION INTRAMUSCULAR; INTRAVENOUS ONCE
Status: COMPLETED | OUTPATIENT
Start: 2018-09-03 | End: 2018-09-03

## 2018-09-03 RX ORDER — ASPIRIN 300 MG/1
300 SUPPOSITORY RECTAL DAILY
Status: DISCONTINUED | OUTPATIENT
Start: 2018-09-03 | End: 2018-09-06 | Stop reason: HOSPADM

## 2018-09-03 RX ORDER — ASPIRIN 300 MG/1
300 SUPPOSITORY RECTAL ONCE
Status: DISCONTINUED | OUTPATIENT
Start: 2018-09-03 | End: 2018-09-03

## 2018-09-03 RX ORDER — ATORVASTATIN CALCIUM 40 MG/1
40 TABLET, FILM COATED ORAL
Status: DISCONTINUED | OUTPATIENT
Start: 2018-09-03 | End: 2018-09-06 | Stop reason: HOSPADM

## 2018-09-03 RX ORDER — NALOXONE HYDROCHLORIDE 0.4 MG/ML
.1-.4 INJECTION, SOLUTION INTRAMUSCULAR; INTRAVENOUS; SUBCUTANEOUS
Status: DISCONTINUED | OUTPATIENT
Start: 2018-09-03 | End: 2018-09-06 | Stop reason: HOSPADM

## 2018-09-03 RX ORDER — LABETALOL HYDROCHLORIDE 5 MG/ML
10-40 INJECTION, SOLUTION INTRAVENOUS EVERY 10 MIN PRN
Status: DISCONTINUED | OUTPATIENT
Start: 2018-09-03 | End: 2018-09-06 | Stop reason: HOSPADM

## 2018-09-03 RX ORDER — BISACODYL 10 MG
10 SUPPOSITORY, RECTAL RECTAL DAILY PRN
Status: DISCONTINUED | OUTPATIENT
Start: 2018-09-03 | End: 2018-09-06 | Stop reason: HOSPADM

## 2018-09-03 RX ORDER — ONDANSETRON 4 MG/1
4 TABLET, ORALLY DISINTEGRATING ORAL EVERY 6 HOURS PRN
Status: DISCONTINUED | OUTPATIENT
Start: 2018-09-03 | End: 2018-09-06 | Stop reason: HOSPADM

## 2018-09-03 RX ORDER — SERTRALINE HYDROCHLORIDE 100 MG/1
100 TABLET, FILM COATED ORAL DAILY
Status: DISCONTINUED | OUTPATIENT
Start: 2018-09-04 | End: 2018-09-06 | Stop reason: HOSPADM

## 2018-09-03 RX ORDER — ONDANSETRON 2 MG/ML
4 INJECTION INTRAMUSCULAR; INTRAVENOUS EVERY 6 HOURS PRN
Status: DISCONTINUED | OUTPATIENT
Start: 2018-09-03 | End: 2018-09-06 | Stop reason: HOSPADM

## 2018-09-03 RX ORDER — BUPROPION HYDROCHLORIDE 150 MG/1
150 TABLET ORAL DAILY
Status: DISCONTINUED | OUTPATIENT
Start: 2018-09-04 | End: 2018-09-06 | Stop reason: HOSPADM

## 2018-09-03 RX ORDER — HYDROCODONE BITARTRATE AND ACETAMINOPHEN 5; 325 MG/1; MG/1
1-2 TABLET ORAL EVERY 4 HOURS PRN
Status: DISCONTINUED | OUTPATIENT
Start: 2018-09-03 | End: 2018-09-06 | Stop reason: HOSPADM

## 2018-09-03 RX ORDER — DEXAMETHASONE SODIUM PHOSPHATE 10 MG/ML
10 INJECTION, SOLUTION INTRAMUSCULAR; INTRAVENOUS ONCE
Status: COMPLETED | OUTPATIENT
Start: 2018-09-03 | End: 2018-09-03

## 2018-09-03 RX ORDER — GADOBUTROL 604.72 MG/ML
11 INJECTION INTRAVENOUS ONCE
Status: COMPLETED | OUTPATIENT
Start: 2018-09-03 | End: 2018-09-03

## 2018-09-03 RX ORDER — SODIUM CHLORIDE 9 MG/ML
INJECTION, SOLUTION INTRAVENOUS CONTINUOUS
Status: DISCONTINUED | OUTPATIENT
Start: 2018-09-03 | End: 2018-09-04

## 2018-09-03 RX ORDER — ACETAMINOPHEN 325 MG/1
650 TABLET ORAL EVERY 4 HOURS PRN
Status: DISCONTINUED | OUTPATIENT
Start: 2018-09-03 | End: 2018-09-06 | Stop reason: HOSPADM

## 2018-09-03 RX ORDER — HYDROMORPHONE HYDROCHLORIDE 1 MG/ML
0.5 INJECTION, SOLUTION INTRAMUSCULAR; INTRAVENOUS; SUBCUTANEOUS
Status: COMPLETED | OUTPATIENT
Start: 2018-09-03 | End: 2018-09-03

## 2018-09-03 RX ORDER — AMOXICILLIN 250 MG
2 CAPSULE ORAL 2 TIMES DAILY PRN
Status: DISCONTINUED | OUTPATIENT
Start: 2018-09-03 | End: 2018-09-06 | Stop reason: HOSPADM

## 2018-09-03 RX ORDER — IOPAMIDOL 755 MG/ML
70 INJECTION, SOLUTION INTRAVASCULAR ONCE
Status: COMPLETED | OUTPATIENT
Start: 2018-09-03 | End: 2018-09-03

## 2018-09-03 RX ORDER — HYDROMORPHONE HYDROCHLORIDE 1 MG/ML
.3-.5 INJECTION, SOLUTION INTRAMUSCULAR; INTRAVENOUS; SUBCUTANEOUS
Status: DISCONTINUED | OUTPATIENT
Start: 2018-09-03 | End: 2018-09-06 | Stop reason: HOSPADM

## 2018-09-03 RX ADMIN — VALPROATE SODIUM 500 MG: 100 INJECTION, SOLUTION INTRAVENOUS at 12:42

## 2018-09-03 RX ADMIN — ASPIRIN 325 MG: 325 TABLET, DELAYED RELEASE ORAL at 15:47

## 2018-09-03 RX ADMIN — HYDROMORPHONE HYDROCHLORIDE 0.5 MG: 1 INJECTION, SOLUTION INTRAMUSCULAR; INTRAVENOUS; SUBCUTANEOUS at 10:15

## 2018-09-03 RX ADMIN — ATORVASTATIN CALCIUM 40 MG: 40 TABLET, FILM COATED ORAL at 20:57

## 2018-09-03 RX ADMIN — KETOROLAC TROMETHAMINE 15 MG: 15 INJECTION, SOLUTION INTRAMUSCULAR; INTRAVENOUS at 10:20

## 2018-09-03 RX ADMIN — HYDROMORPHONE HYDROCHLORIDE 0.5 MG: 1 INJECTION, SOLUTION INTRAMUSCULAR; INTRAVENOUS; SUBCUTANEOUS at 09:40

## 2018-09-03 RX ADMIN — SODIUM CHLORIDE 1000 ML: 9 INJECTION, SOLUTION INTRAVENOUS at 09:58

## 2018-09-03 RX ADMIN — HYDROMORPHONE HYDROCHLORIDE 0.5 MG: 1 INJECTION, SOLUTION INTRAMUSCULAR; INTRAVENOUS; SUBCUTANEOUS at 11:24

## 2018-09-03 RX ADMIN — ACETAMINOPHEN 650 MG: 325 TABLET, FILM COATED ORAL at 21:26

## 2018-09-03 RX ADMIN — PROCHLORPERAZINE EDISYLATE 10 MG: 5 INJECTION INTRAMUSCULAR; INTRAVENOUS at 09:40

## 2018-09-03 RX ADMIN — IOPAMIDOL 70 ML: 755 INJECTION, SOLUTION INTRAVENOUS at 13:22

## 2018-09-03 RX ADMIN — SODIUM CHLORIDE: 9 INJECTION, SOLUTION INTRAVENOUS at 17:10

## 2018-09-03 RX ADMIN — DEXAMETHASONE SODIUM PHOSPHATE 10 MG: 10 INJECTION, SOLUTION INTRAMUSCULAR; INTRAVENOUS at 11:21

## 2018-09-03 RX ADMIN — ACETAMINOPHEN 650 MG: 325 TABLET, FILM COATED ORAL at 17:09

## 2018-09-03 RX ADMIN — GADOBUTROL 11 ML: 604.72 INJECTION INTRAVENOUS at 16:40

## 2018-09-03 RX ADMIN — GADOBUTROL 10 ML: 604.72 INJECTION INTRAVENOUS at 19:38

## 2018-09-03 RX ADMIN — SODIUM CHLORIDE, PRESERVATIVE FREE 90 ML: 5 INJECTION INTRAVENOUS at 13:22

## 2018-09-03 ASSESSMENT — VISUAL ACUITY
OU: NORMAL ACUITY;GLASSES
OU: NORMAL ACUITY

## 2018-09-03 ASSESSMENT — ENCOUNTER SYMPTOMS
NAUSEA: 1
ABDOMINAL PAIN: 0
HEADACHES: 1
NECK PAIN: 0
VOMITING: 1
BACK PAIN: 0

## 2018-09-03 ASSESSMENT — ACTIVITIES OF DAILY LIVING (ADL)
ADLS_ACUITY_SCORE: 7
ADLS_ACUITY_SCORE: 7

## 2018-09-03 NOTE — PHARMACY-ADMISSION MEDICATION HISTORY
Admission medication history interview status for the 9/3/2018  admission is complete. See EPIC admission navigator for prior to admission medications     Medication history source reliability:Good    Actions taken by pharmacist (provider contacted, etc):Interviewed patient using epic med list and reviewed chart for recent med changes.     Additional medication history information not noted on PTA med list :  Unsure of patient compliance as prescriptions have not been filled in several months.     Medication reconciliation/reorder completed by provider prior to medication history? No    Time spent in this activity: 5 minutes    Prior to Admission medications    Medication Sig Last Dose Taking? Auth Provider   buPROPion (WELLBUTRIN XL) 150 MG 24 hr tablet TAKE 1 TABLET (150 MG) BY MOUTH EVERY MORNING - NEEDS APPOINTMENT 9/2/2018 at AM Yes Neida Benz MD   sertraline (ZOLOFT) 100 MG tablet Take 1 tablet (100 mg) by mouth daily 9/2/2018 at AM Yes Neida Benz MD   ALLERGY SHOTS  n/a at n/a  Reported, Patient   order for DME AIRSENSE 10  7-12 CM/H20  NASAL WISP   Reported, Patient

## 2018-09-03 NOTE — CONSULTS
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   He initially presented with vomiting and worst HA of his life.  He had a negative Head CT in the ED followed by unremarkable LP: W7, R8, P41, G63.  CTA revealed probable dissection.  His exam has improved, he is fairly amnestic to his initial presentation.      His CTA Head/Neck was suspicious for dissection.    ROS: severe coughing with a cold two weeks ago with some initial headache/neck pain at that time.      Stroke/TIA Evaluation summarized  MRI/Head CT: as above  Vessel imaging: probable dissection, MRI with T1 fat suppression pending      Stroke Scales      National Institutes of Health Stroke Scale (at presentation)   NIHSS done at:  time patient seen      Score    Level of consciousness:  (0)   Alert, keenly responsive    LOC questions:  (0)   Answers both questions correctly    LOC commands:  (0)   Performs both tasks correctly    Best gaze:  (0)   Normal    Visual:  (2)   Complete hemianopia    Facial palsy:  (0)   Normal symmetrical movements    Motor arm (left):  (0)   No drift    Motor arm (right):  (0)   No drift    Motor leg (left):  (0)   No drift    Motor leg (right):  (0)   No drift    Limb ataxia:  (0)   Absent    Sensory:  (0)   Normal- no sensory loss    Best language:  (1)   Mild to moderate aphasia    Dysarthria:  (1)   Mild to moderate dysarthria    Extinction and inattention:  (0)   No abnormality        NIHSS Total Score:  4          Endovascular Treatment:  Not pursued because of lack of large vessel occlusion    TPA Treatment:  Not given due to outside the time window.  Impression:  1. 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 q2  neurochecks  2. Aspirin 325mg daily  3. MRA neck to confirm dissection.  If positive, no need for remainder of stroke work-up (Echo, lipids, etc)   4. Speech/PT/OT  5. Head CT in am to eval for posterior fossa edema    Alvin Fallon MD, MS  Vascular Neurology  September 3, 2018    Please contact the stroke service with any questions: link to Text page or feel free to call my cellphone.  I personally reviewed all relevant labs and neuroimaging.  I spent 50 minutes reviewing labs, diagnostic studies, neuroimaging, and evaluating the patient.    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, also had until age 4, last sz around age 21         Current Facility-Administered Medications:      acetaminophen (TYLENOL) tablet 650 mg, 650 mg, Oral, Q4H PRN, Matteo Godoy MD, 650 mg at 09/03/18 1709     aspirin EC tablet 325 mg, 325 mg, Oral, Daily, 325 mg at 09/03/18 1547 **OR** aspirin Suppository 300 mg, 300 mg, Rectal, Daily, Mtateo Godoy MD     atorvastatin (LIPITOR) tablet 40 mg, 40 mg, Oral or NG Tube, Daily at 8 pm, Matteo Godoy MD     bisacodyl (DULCOLAX) Suppository 10 mg, 10 mg, Rectal, Daily PRN, Matteo Godoy MD     [START ON 9/4/2018] buPROPion (WELLBUTRIN XL) 24 hr tablet 150 mg, 150 mg, Oral, Daily, Matteo Godoy MD     HYDROcodone-acetaminophen (NORCO) 5-325 MG per tablet 1-2 tablet, 1-2 tablet, Oral, Q4H PRN, Matteo Godoy MD     HYDROmorphone (PF) (DILAUDID) injection 0.3-0.5 mg, 0.3-0.5 mg, Intravenous, Q2H PRN, Matteo Godoy MD     labetalol (NORMODYNE/TRANDATE) injection 10-40 mg, 10-40 mg, Intravenous, Q10 Min PRN, Matteo Godoy MD     magnesium  hydroxide (MILK OF MAGNESIA) suspension 30 mL, 30 mL, Oral, Daily PRN, Matteo Godoy MD     Medication Instruction, , Does not apply, Continuous PRN, Matteo Godoy MD     melatonin tablet 1 mg, 1 mg, Oral, At Bedtime PRN, Matteo Godoy MD     naloxone (NARCAN) injection 0.1-0.4 mg, 0.1-0.4 mg, Intravenous, Q2 Min PRN, Matteo Godoy MD     ondansetron (ZOFRAN-ODT) ODT tab 4 mg, 4 mg, Oral, Q6H PRN **OR** ondansetron (ZOFRAN) injection 4 mg, 4 mg, Intravenous, Q6H PRN, Matteo Godoy MD     prochlorperazine (COMPAZINE) injection 10 mg, 10 mg, Intravenous, Q6H PRN **OR** prochlorperazine (COMPAZINE) tablet 10 mg, 10 mg, Oral, Q6H PRN **OR** prochlorperazine (COMPAZINE) Suppository 25 mg, 25 mg, Rectal, Q12H PRN, Matteo Godoy MD     senna-docusate (SENOKOT-S;PERICOLACE) 8.6-50 MG per tablet 1 tablet, 1 tablet, Oral, BID PRN **OR** senna-docusate (SENOKOT-S;PERICOLACE) 8.6-50 MG per tablet 2 tablet, 2 tablet, Oral, BID PRN, Matteo Godoy MD     [START ON 9/4/2018] sertraline (ZOLOFT) tablet 100 mg, 100 mg, Oral, Daily, Matteo Godoy MD     sodium chloride 0.9% infusion, , Intravenous, Continuous, Matteo Godoy MD, Last Rate: 100 mL/hr at 09/03/18 1710    Social history & family history reviewed, please refer to admission H&P    ROS: completed in detail and negative unless otherwise noted above.    Vital Signs:  B/P: 142/86, T: 98.2, P: 76, R: 16     Neuro:  Mental status: Awake, alert, attentive, oriented x3. Speech is dysarthric with word-finding difficulties and word substitutions.  Follows simple commands.    No neglect.  Cranial nerves: EOMI, R VF cutl, face symmetric, facial sensation intact, shoulder shrug strong, tongue/uvula midline, hearing intact to conversation.  Motor: 5/5 strength in all 4 extremities without drift.  Sensory): Intact to light touch in face, arms, and legs  Coordination: Finger to nose intact bilaterally without  dysmetria.  Normal heel-shin test bilaterally  Gait: Deferred     Labs/Studies:  CBC:     Recent Labs  Lab 09/03/18  0934   WBC 7.5   RBC 5.10   HGB 15.4   HCT 43.5        Basic Metabolic Panel:   Recent Labs   Lab Test  09/03/18   0934  02/17/16   1202  02/13/13   1116   NA  143  142  142   POTASSIUM  3.5  3.9  3.9   CHLORIDE  109  108  105   CO2  25  23  23   BUN  16  18  20   CR  1.04  1.02  1.03   GLC  158*  97  92   KHADIJAH  8.0*  8.9  8.9     Liver panel:  Recent Labs   Lab Test  09/03/18   0934   PROTTOTAL  6.9   ALBUMIN  3.7   BILITOTAL  0.3   ALKPHOS  81   AST  22   ALT  29     INR:No lab results found.   Lipid Profile:  Recent Labs   Lab Test  02/17/16   1202  02/13/13   1116  03/16/12   1113   CHOL  187  170  193   HDL  30*  29*  34*   LDL  124*  122  145*   TRIG  167*  98  67   CHOLHDLRATIO   --   5.9*  5.6*     A1C:   Recent Labs   Lab Test  09/03/18   0934   A1C  5.6     Troponin I: No lab results found.      Imaging:  Relevant findings as per the Impression above.

## 2018-09-03 NOTE — ED PROVIDER NOTES
"  History     Chief Complaint:  Headache    HPI   Vahe Haas is a 43 year old male who presents with a headache. The patient was feeling alright this morning around 7:30 AM, but suddenly started to feel \"off\". The patient then started to notice a headache that he described as the worst headache of his life, along with nausea and vomiting. His wife then found him after he banged on the floor to get her attention, and called the ambulance to bring the patient to the ED to be further assessed. The patient was given 8 mg Zofran in the ambulance ride over to the ED. The paramedics stated that he was pale and clammy when they found him, and had no chest pain, shortness of breath, weakness, or asymmetries. The patient denies any pain other than the headaches.     Allergies:  No Known Drug Allergies    Medications:    Wellbutrin  Flonase  Zoloft    Past Medical History:    Allergic rhinitis  Insomnia  Moderate major depression  Other convulsions    Past Surgical History:    HC tooth extraction w/forcep    Family History:    Hypertension  Allergies  Depression  Lipids  Musculoskeletal disorder  Osteoporosis  Asthma  Thyroid disease    Social History:  Marital Status:   Smoking Status: Never  Alcohol Use: Yes    Review of Systems   Gastrointestinal: Positive for nausea and vomiting. Negative for abdominal pain.   Musculoskeletal: Negative for back pain and neck pain.   Neurological: Positive for headaches.   All other systems reviewed and are negative.    Physical Exam     Patient Vitals for the past 24 hrs:   BP Temp Temp src Pulse Heart Rate Resp SpO2   09/03/18 1309 - - - - 79 (!) 6 95 %   09/03/18 1300 145/86 - - - 74 9 96 %   09/03/18 1245 137/84 - - - 80 10 95 %   09/03/18 1145 140/73 - - - 60 11 93 %   09/03/18 1142 - - - - 60 12 94 %   09/03/18 1130 130/81 - - - 59 12 94 %   09/03/18 1045 138/88 - - - 62 14 93 %   09/03/18 1030 139/78 - - - 57 16 95 %   09/03/18 1015 151/84 - - - 64 13 94 %   09/03/18 1000 " 151/87 - - - 73 (!) 31 93 %   09/03/18 0957 - - - - 64 20 95 %   09/03/18 0932 (!) 152/100 97.6  F (36.4  C) Oral 76 76 16 97 %       Physical Exam  GENERAL: looks uncomfortable, vomiting  HEAD: atraumatic  EYES: pupils reactive, extraocular muscles intact, conjunctivae normal  ENT:  mucus membranes moist  NECK:  trachea midline, normal range of motion  RESPIRATORY: no tachypnea, breath sounds clear to auscultation   CVS: normal S1/S2, no murmurs, intact distal pulses  ABDOMEN: soft, nontender, nondistention  MUSCULOSKELETAL: no deformities  SKIN: warm and dry, no acute rashes or ulceration  NEURO: GCS 15, cranial nerves intact, alert and oriented x3, slight dysmmetry with finger to nose bilaterally  PSYCH:  Mood/affect normal    Emergency Department Course   ECG:  ECG taken at 0930, ECG read at 0931  Normal sinus rhythm  Nonspecific ST and T wave abnormality  Prolonged QT  Abnormal ECG  Rate 74 bpm. WY interval 168. QRS duration 90. QT/QTc 426/472. P-R-T axes 79 56 19.    Imaging:  Radiographic findings were communicated with the patient who voiced understanding of the findings.    CT Head w/o Contrast:  No acute intracranial abnormality.  Per Radiologist.     CTA Head Neck with Contrast:  Right vertebral artery dissection/occlusion.  Per Radiologist.     Laboratory:  CBC: WNL. (WBC 7.5, HGB 15.4, )   BMP: Glucose 158 (H), Calcium 8.0 (L), o/w WNL. (Creatinine 1.04)  Glucose CSF: 63  Protein total CSF: 41  Gram Stain: Normal  CSF Culture Aerobic Bacterial: Pending  Cell count with differential CSF: WBC 7 (H), RBC 8 (H), o/w Normal.  Hepatic Panel: Normal.    Interventions:  0940: 10 mg Compazine IV  0958: NS 1L IV Bolus  1015: 0.g mg Dilaudid IV  1020: 15 mg Toradol IV  1121: 10 mg Decadron IV  1242: 500 mg Depacon IV  1322: 70 mL Isovue-370 IV  1322: 90 mL Sodium Chloride IV    Emergency Department Course:  Past medical records, nursing notes, and vitals reviewed.  0928: I performed an exam of the patient  and obtained history, as documented above.  1013: I rechecked the patient  1117: LP performed  1252: Spoke with Dr. Stewart regarding patient care  1427: Spoke with Dr. Fallon of neuro stroke regarding patient    IV inserted and blood drawn.   The patient was taken for head CT and head neck CTA, see imaging results above.     admitted to hospital    Impression & Plan      Medical Decision Making:  Vahe Haas is a 43 year old male who presents to the ED for evaluation of severe headache, some slight visual disturbance and vomiting. Work up is as above. Patient was not the best historian, first namely telling story of severe headache and vomiting.  The visual disturbance and speech was mentioned much latter in his coarse, and neck pain very late.  Patient initially had a CT head to rule out SAH or ICH.  He was then treated with multiple agents for migraine headache.  Given the somewhat atypical nature of it, did speak with neurology, suggested CTA, which I just received a call to show dissection to the right vertebral artery. Patient will be admitted for further work up and treatment.  I ordered an aspirin although patient was taken upstairs for admission, will be given upstairs.      Diagnosis:    ICD-10-CM   1. Vertebral artery dissection (H) I77.74       Disposition:  Admitted to neuro bed    Discharge Medications:  New Prescriptions    No medications on file         Ganesh Velasquez  9/3/2018    EMERGENCY DEPARTMENT    I, Ganesh Velasquez, am serving as a scribe at 9:28 AM on 9/3/2018 to document services personally performed by No att. providers found based on my observations and the provider's statements to me.        Munir Rosen MD  09/03/18 1718

## 2018-09-03 NOTE — ED NOTES
"[facility]  ED Nurse Handoff Report    ED Chief complaint: Headache (brought in by EMS, FOUND ON FLOOR (did not fall) c/o headache worst of life and n/v. Medics st pt was pale and diaphoretic on their arrival)      ED Diagnosis:   Final diagnoses:   None       Code Status: Not on file    Allergies:   Allergies   Allergen Reactions     Seasonal Allergies        Activity level - Baseline/Home:  Independent    Activity Level - Current:   Stand with Assist     Needed?: No    Isolation: No  Infection: Not Applicable  Bariatric?: No    Vital Signs:   Vitals:    09/03/18 1145 09/03/18 1245 09/03/18 1300 09/03/18 1309   BP: 140/73 137/84 145/86    Pulse:       Resp: 11 10 9 (!) 6   Temp:       TempSrc:       SpO2: 93% 95% 96% 95%       Cardiac Rhythm: ,        Pain level: 0-10 Pain Scale: 6    Is this patient confused?: No   Ashley - Suicide Severity Rating Scale Completed?  Yes  If yes, what color did the patient score?  White    Patient Report: Initial Complaint: Pt presents to the ED via EMS after having \"the worse headache of his life\"  With nausea and vomiting.  Focused Assessment: Pt presents to ED via EMS after complaining of a severe headache with nausea and vomiting.  Pt somnolent upon arrival, alert and oriented but speaking slowly.  Pt complains of a headache rated 9/10 and nausea, denies other pain.  Vital signs unremarkable.  No focal deficits, facial asymmetry, or aphasia noted.  Pt has strength in all extremities but states he feels he is unable to walk.  Pt states headache improved froma 9/10 to a 6-7/10 after numerous medications (see below).    Tests Performed: CBC, BMP, Hepatic panel, and spinal tap labs, CT and CTA of head and neck.  Abnormal Results:  Basic labs and CSF unremarkable, CT unremarkable, CTA reports reads as follows   Right vertebral artery dissection/occlusion. [Critical Result: Vascular occlusion] Finding was identified on 9/3/2018 1:31 PM. Dr. Rosen was " contacted by me on 9/3/2018 1:38 PM and verbalize understanding of the critical result.        Treatments provided: 18 G IV placed in right AC pt given 1 liter NS, 0.5mg IV dilaudid x3, 10mg Compazine IV, 15 MG toradol IV, 10mg decadron IV, and 500 mg valproate IV.    Family Comments: Wife and parents at bedside, very involved in cares.    OBS brochure/video discussed/provided to patient: N/A    ED Medications:   Medications   0.9% sodium chloride BOLUS (0 mLs Intravenous Stopped 9/3/18 1243)   HYDROmorphone (PF) (DILAUDID) injection 0.5 mg (0.5 mg Intravenous Given 9/3/18 1124)   prochlorperazine (COMPAZINE) injection 10 mg (10 mg Intravenous Given 9/3/18 0940)   ketorolac (TORADOL) injection 15 mg (15 mg Intravenous Given 9/3/18 1020)   dexamethasone PF (DECADRON) injection 10 mg (10 mg Intravenous Given 9/3/18 1121)   valproate (DEPACON) 500 mg in sodium chloride 0.9 % 50 mL intermittent infusion (500 mg Intravenous New Bag 9/3/18 1242)   iopamidol (ISOVUE-370) solution 70 mL (70 mLs Intravenous Given 9/3/18 1322)   sodium chloride 0.9 % bag 500mL for CT scan flush use (90 mLs Intravenous Given 9/3/18 1322)       Drips infusing?:  No    For the majority of the shift this patient was Green.   Interventions performed were NA.    Severe Sepsis OR Septic Shock Diagnosis Present: No      ED NURSE PHONE NUMBER: [phone]

## 2018-09-03 NOTE — PLAN OF CARE
RECEIVING UNIT ED HANDOFF REVIEW    ED Nurse Handoff Report was reviewed by: Tamera Emery on September 3, 2018 at 2:06 PM

## 2018-09-03 NOTE — PROGRESS NOTES
"   09/03/18 1548   General Information   Onset Date 09/03/18   Start of Care Date 09/03/18   Referring Physician Matteo Godoy MD   Patient Profile Review/OT: Additional Occupational Profile Info See Profile for full history and prior level of function   Patient/Family Goals Statement water   Swallowing Evaluation Bedside swallow evaluation   Behaviorial Observations WFL (within functional limits)   Mode of current nutrition NPO   Respiratory Status Room air   Comments Mr. Haas is a 43-year-old male with a history of anxiety, depression, JANA who was brought by EMS for headache.  He states that he woke up around 7:30 in the morning and started to feel \"off,\" started to have some headache, nausea, felt confused and felt like words were not coming out of his mouth.  He apparently banged on the floor to get his wife's attention and then EMS was called and the patient was brought to the ER for further evaluation.  Right vertebral artery dissection and occlusion with likely acute stroke.   Clinical Swallow Evaluation   Oral Musculature generally intact   Structural Abnormalities none present   Dentition present and adequate   Mucosal Quality good   Mandibular Strength and Mobility intact   Oral Labial Strength and Mobility WFL   Lingual Strength and Mobility WFL   Velar Elevation intact   Buccal Strength and Mobility intact   Laryngeal Function Swallow;Voicing initiated   Oral Musculature Comments Enlarged tonsils; WFL   Additional Documentation Yes   Clinical Swallow Eval: Thin Liquid Texture Trial   Mode of Presentation, Thin Liquids cup;straw   Oral Phase of Swallow WFL   Pharyngeal Phase of Swallow intact   Diagnostic Statement belching after every swallow; no overt s/sx of aspiration   Clinical Swallow Eval: Solid Food Texture Trial   Mode of Presentation, Solid fed by clinician   Oral Phase, Solid WFL   Pharyngeal Phase, Solid intact   Diagnostic Statement no overt s/sx of aspiration   Esophageal Phase of " Swallow   Patient reports or presents with symptoms of esophageal dysphagia Yes   General Therapy Interventions   Planned Therapy Interventions Dysphagia Treatment   Dysphagia treatment Instruction of safe swallow strategies   Swallow Eval: Clinical Impressions   Skilled Criteria for Therapy Intervention Skilled criteria met.  Treatment indicated.   Functional Assessment Scale (FAS) 6   Treatment Diagnosis Minimal dysphagia   Diet texture recommendations Regular diet;Thin liquids   Recommended Feeding/Eating Techniques alternate between small bites and sips of food/liquid;check mouth frequently for oral residue/pocketing;hard swallow w/ each bite or sip;maintain upright posture during/after eating for 30 mins   Therapy Frequency (1-2 sessions)   Predicted Duration of Therapy Intervention (days/wks) 1 week   Anticipated Discharge Disposition home w/ outpatient services   Risks and Benefits of Treatment have been explained. Yes   Patient, family and/or staff in agreement with Plan of Care Yes   Clinical Impression Comments SLP: Pt presents with minimal dysphagia on clinical swallow evaluation secondary to suspected aerophagia. Pt alert with significant other present. Pt reported significant dysarthria and word finding difficulty that has waxed and waned throughout the day. Hesitations and thickness of speech noted in conversation, however, pt able to express wants and needs and participate in complex conversation. Oral motor exam WFL with adequate strength and ROM. Good oral control and no overt s/sx of aspiration with trials of thin liquids by cup and straw and regular textures. Belch noted on every sip of liquid and suspect aerophagia that pt reported happens at baseline. Pt denied GERD symptoms. Pt verbalized agreement with recommended: regular diet, thin liquids, GERD precautions. ST to follow 1-2 sessions at meal for diet tolerance and further speech/language evaluation if indicated.    Total Evaluation Time    Total Evaluation Time (Minutes) 14

## 2018-09-03 NOTE — PLAN OF CARE
Problem: Patient Care Overview  Goal: Plan of Care/Patient Progress Review  Discharge Planner SLP   Patient plan for discharge: Did not state  Current status: SLP: Pt presents with minimal dysphagia on clinical swallow evaluation secondary to suspected aerophagia. Pt alert with significant other present. Pt reported significant dysarthria and word finding difficulty that has waxed and waned throughout the day. Hesitations and thickness of speech noted in conversation, however, pt able to express wants and needs and participate in complex conversation. Oral motor exam WFL with adequate strength and ROM. Good oral control and no overt s/sx of aspiration with trials of thin liquids by cup and straw and regular textures. Belch noted on every sip of liquid and suspect aerophagia that pt reported happens at baseline. Pt denied GERD symptoms. Pt verbalized agreement with recommended: regular diet, thin liquids, GERD precautions. ST to follow 1-2 sessions at meal for diet tolerance and further speech/language evaluation if indicated.   Barriers to return to prior living situation: possible dysarthria/aphasia  Recommendations for discharge: home with possible OP SLP services pending further work up  Rationale for recommendations: Pending MRI/Neurology        Entered by: Tanya Beebe 09/03/2018 3:46 PM

## 2018-09-03 NOTE — H&P
"Admitted:     09/03/2018      PRIMARY CARE PHYSICIAN:  Dr. Neida Benz.      CHIEF COMPLAINT:  Headache, confusion.      HISTORY OF PRESENT ILLNESS:  History was reviewed from the patient and his parents present by the bedside.  Mr. Haas is a 43-year-old male with a history of anxiety, depression, JANA who was brought by EMS for headache.  He states that he woke up around 7:30 in the morning and started to feel \"off,\" started to have some headache, nausea, felt confused and felt like words were not coming out of his mouth.  He apparently banged on the floor to get his wife's attention and then EMS was called and the patient was brought to the ER for further evaluation.  He denies any fever, chills, rigors.  No chest pain or shortness of breath.  Denies any palpitations.  No pain in abdomen.  Reports normal bowel and bladder habit.  Here in the ER, he was seen by Dr. Rosen.  Initial CT head was unremarkable.  LP was done.  He was given dexamethasone, valproic acid for presumed migraine and after discussion with neurologist, a CT angiogram was done which was noted with a right vertebral artery dissection.  Hospitalist was then requested admission for further evaluation.      REVIEW OF SYSTEMS:  A 10-point review of system was done and was negative apart from those mentioned in the history of present illness.      PAST MEDICAL HISTORY:   1.  Anxiety.   2.  Depression.   3.  Allergic rhinitis.   4.  JANA on CPAP.      MEDICATIONS PRIOR TO ADMISSION:  Prescriptions Prior to Admission   Medication Sig Dispense Refill Last Dose     buPROPion (WELLBUTRIN XL) 150 MG 24 hr tablet TAKE 1 TABLET (150 MG) BY MOUTH EVERY MORNING - NEEDS APPOINTMENT 90 tablet 1 9/2/2018 at AM     sertraline (ZOLOFT) 100 MG tablet Take 1 tablet (100 mg) by mouth daily 90 tablet 1 9/2/2018 at AM     ALLERGY SHOTS    n/a at n/a     order for DME AIRSENSE 10  7-12 CM/H20  NASAL WISP   Taking         ALLERGIES:  NO KNOWN DRUG ALLERGIES.    "   SOCIAL HISTORY:  He denies smoking, takes occasional alcohol, no illicit drug use.      FAMILY HISTORY:  Reviewed and not pertinent to current presentation.      PHYSICAL EXAMINATION:   GENERAL:  The patient is conscious, alert, awake, oriented x3, lying comfortably in bed, but is slow to respond and intermittently seemed to have slurred speech.   VITAL SIGNS:  Temperature 97.6, heart rate of 76, blood pressure is 139/84, saturation 96% on room air.   HEENT:  Pupils are equal and reactive to light and accommodation.  Extraocular movements are intact.  Oral mucosa is moist.   NECK:  Supple, no raised JVD.   RESPIRATORY:  Lung sounds bilaterally clear to auscultation, no wheezes or crepitation.   CARDIOVASCULAR:  Normal S1-S2, regular rate and rhythm, no murmur.   ABDOMEN:  Soft, nontender, nondistended, no guarding, rigidity or rebound tenderness.   LOWER EXTREMITIES:  With no edema.   NEUROLOGIC:  He does have intermittent slurred speech.  Apart from that, no focal neurological deficits noted.  Cranial nerves II-XII grossly intact.   PSYCHIATRIC:  Normal mood and affect.      LABORATORY AND IMAGING:  CBC, BMP reviewed in Epic are mostly unremarkable, slightly elevated blood glucose of 158.  LFTs normal.  CT head without contrast reviewed, shows no acute intracranial abnormality.  CT angiogram of head and neck reviewed and noted with right vertebral artery dissection/occlusion.  EKG reviewed by me shows normal sinus rhythm, no acute ST-T wave changes, QTc of 472.      ASSESSMENT AND PLAN:  Mr. Vahe Haas is a 43-year-old male with anxiety, depression and allergic rhinitis, JANA who was brought to ED with complaints of headache, nausea and intermittent confusion and noted with a right vertebral artery dissection.     1.  Right vertebral artery dissection and occlusion with likely acute stroke.  Will admit him as inpatient to neuro telemetry.  Neuro checks every 4 hours, n.p.o., speech-language pathology evaluation  along with PT, OT evaluation and neurology consult.  We will start him on aspirin and statin daily.  He did not get aspirin in the ER, so we will order 1 dose stat.  Will get a brain MRI, echo with bubble studies.  Neurology to evaluate.  We will check an A1c and fasting lipid panel, start Lipitor 40 mg daily.     2.  Elevated blood pressure.  We will allow permissive hypertension.  We will have labetalol p.r.n. per stroke protocol, anxiety/depression, we will resume his prior to admission Zoloft and Wellbutrin.   3.  Obstructive sleep apnea, continue with CPAP.   4.  Deep venous thrombosis prophylaxis:  Mechanical with PCD boots.   5.  Fluids, electrolytes and nutrition:  N.p.o. until evaluated by speech.  Will have normal saline at 100 mL per hour.      CODE STATUS:  FULL CODE.         NOLBERTO JORDAN MD             D: 2018   T: 2018   MT: DOUG      Name:     VIDHI MAGANA   MRN:      [id number]        Account:      [id number]   :      1975        Admitted:     2018                   Document: [id number]       cc: Neida Benz MD

## 2018-09-03 NOTE — IP AVS SNAPSHOT
[address] [facility]    [facility] [address]    [place] [address]    Phone:  [phone]                                       After Visit Summary   9/3/2018    Vahe Haas    MRN: [id number]           After Visit Summary Signature Page     I have received my discharge instructions, and my questions have been answered. I have discussed any challenges I see with this plan with the nurse or doctor.    ..........................................................................................................................................  Patient/Patient Representative Signature      ..........................................................................................................................................  Patient Representative Print Name and Relationship to Patient    ..................................................               ................................................  Date                                            Time    ..........................................................................................................................................  Reviewed by Signature/Title    ...................................................              ..............................................  Date                                                            Time          22EPIC Rev 08/18

## 2018-09-03 NOTE — PLAN OF CARE
Pt here from ED. Reports HA 2/10 and only slight nausea. Has slurred speech and some word finding difficulty. Reports feeling unsteady, difficulty with balance when up walking to BR. Passed swallow with Speech therapy. Tele NS. MRI pending. Wife in room.

## 2018-09-03 NOTE — IP AVS SNAPSHOT
MRN:[id number]                      After Visit Summary   9/3/2018    Vahe Haas    MRN: [id number]           Thank you!     Thank you for choosing [facility] for your care. Our goal is always to provide you with excellent care. Hearing back from our patients is one way we can continue to improve our services. Please take a few minutes to complete the written survey that you may receive in the mail after you visit with us. Thank you!        Patient Information     Date Of Birth          1975        Designated Caregiver       Most Recent Value    Caregiver    Will someone help with your care after discharge? yes    Name of designated caregiver Sisi - spouse    Phone number of caregiver [phone]    Caregiver address [address] [facility]      About your hospital stay     You were admitted on:  September 3, 2018 You last received care in the:  [place] [facility] [address] [facility]    You were discharged on:  September 6, 2018        Reason for your hospital stay       You were admitted with headache, nausea, confusion, and found to have strokes.                  Who to Call     For medical emergencies, please call 911.  For non-urgent questions about your medical care, please call your primary care provider or clinic, [phone]          Attending Provider     Provider Specialty    Satish Ordaz MD Emergency Medicine    Munir Rosen MD Emergency Medicine    [facility] Center[facility]teo graf MD Internal Medicine       Primary Care Provider Office Phone # Fax #    Neida Benz [phone]      After Care Instructions     Activity       Your activity upon discharge: activity as tolerated and no driving until you see outpatient occupational therapy            Diet       Follow this diet upon discharge: Orders Placed This Encounter      Regular Diet Adult Thin Liquids (water, ice chips, juice, milk, gelatin, ice cream, etc) (straws ok)             Discharge Instructions       - Continue with Aspirin 325mg daily.  - Patient educated regarding stroke symptoms and when to call 911.   - He will be supervised by his wife 24-48hr at home if he leaves today   - Continue hydration and Caffeine for headaches  - Continue stool softeners/laxatives  - Repeat CTA head/neck in 3 months  - No driving until evaluated by OT as outpatient   - Call 911 if headaches worsen or increase in frequency, experiences dizziness, somnolence, or weakness or if there are any changes in consciousness or vision.                  Follow-up Appointments     Follow-up and recommended labs and tests        Follow up with primary care provider, Neida Benz, within 7 days to evaluate medication change, to evaluate treatment change, for hospital follow- up and regarding new diagnosis.  The following labs/tests are recommended: monitor blood pressure and need for antihypertensives. Mild LVH on ECHO 9/5/18.    Follow up as instructed in neuro clinic in 3 months. Also have repeat CTA head/neck in 3 months to be arranged by neurology.            Follow-up and recommended labs and tests        You have CT-Angio scheduled on December 6 at 11:30.  Please check-in 30 minutes early and do not eat or drink at least 2 hours prior to procedure. Please see further instruction and scheduled appointment sections for more information regarding your CT-angio.    Neurology follow up in 12 weeks.  Dr Forrest's  will call you/wife to schedule your appointment.  If you do not hear from them by noon tomorrow please call clinic at [phone].                  Your next 10 appointments already scheduled     Sep 14, 2018  1:00 PM CDT   Office Visit with Neida Benz MD   [facility] ([facility])    [address] [facility] [address] [phone]           Bring a current list of meds and any records pertaining to this visit. For Physicals,  please bring immunization records and any forms needing to be filled out. Please arrive 10 minutes early to complete paperwork.            Dec 06, 2018 11:30 AM CST   CTA  HEAD NECK WITH CONTRAST with SHCT1   [facility] ([facility])    [address] [place] [address]   [phone]           Please bring any scans or X-rays taken at other hospitals, if similar tests were done. Also bring a list of your medicines, including vitamins, minerals and over-the-counter drugs. It is safest to leave personal items at home.  Be sure to tell your doctor:   If you have any allergies.   If there s any chance you are pregnant.   If you are breastfeeding.    If you have diabetes as your medication may need to be adjusted for this exam.  You will have contrast for this exam. To prepare:   Do not eat or drink for 2 hours before your exam. If you need to take medicine, you may take it with small sips of water. (We may ask you to take liquid medicine as well.)   The day before your exam, drink extra fluids at least six 8-ounce glasses (unless your doctor tells you to restrict your fluids).  Patients over 70 or patients with diabetes or kidney problems:   If you haven t had a blood test (creatinine test) within the last 30 days, the Cardiologist/Radiologist may require you to get this test prior to your exam.  Please wear loose clothing, such as a sweat suit or jogging clothes. Avoid snaps, zippers and other metal. We may ask you to undress and put on a hospital gown.  If you have any questions, please call the Imaging Department where you will have your exam.              Additional Services     Occupational Therapy Referral       *This therapy referral will be filtered to a centralized scheduling office at [facility] and the patient will receive a call to schedule an appointment at a [place] location most convenient for them. *     [facility]  "provides Occupational Therapy evaluation and treatment and many specialty services across the [facility].  If requesting a specialty program, please choose from the list below.    If you have not heard from the scheduling office within 2 business days, please call [phone] for all locations, with the exception of [place], please call [phone] and [place], please call [phone]    Treatment: Evaluation & Treatment  Special Instructions/Modalities: as below  Special Programs: Cognition Assessment s/p Stroke    Please be aware that coverage of these services is subject to the terms and limitations of your health insurance plan.  Call member services at your health plan with any benefit or coverage questions.      **Note to Provider:  If you are referring outside of [place] for the therapy appointment, please list the name of the location in the \"special instructions\" above, print the referral and give to the patient to schedule the appointment.            Speech Therapy Referral       *This therapy referral will be filtered to a centralized scheduling office at [facility] and the patient will receive a call to schedule an appointment at a [place] location most convenient for them. *     [facility] provides Speech Therapy evaluation and treatment and many specialty services across the [place] system.  If requesting a specialty program, please choose from the list below.  If you have not heard from the scheduling office within 2 business days, please call [phone] for all locations, with the exception of [place], please call [phone] and [place], please call [phone]      Treatment: Evaluation & Treatment  Speech Treatment Diagnosis: Cognitive Deficits  Dysarthria  Language Deficits  S/p CVA, visual assessment/intervention  Special Instructions: N/A  Special Programs: cognition assessment    Please be aware that coverage of these services is " "subject to the terms and limitations of your health insurance plan.  Call member services at your health plan with any benefit or coverage questions.      **Note to Provider:  If you are referring outside of [place] for the therapy appointment, please list the name of the location in the \"special instructions\" above, print the referral and give to the patient to schedule the appointment.                  Future tests that were ordered for you     CTA Head Neck with Contrast                 Further instructions from your care team           CTA HEAD NECK W - Please bring any scans or X-rays taken at other hospitals, if similar tests were done. Also bring a list of your medicines, including vitamins, minerals and over-the-counter drugs. It is safest to leave personal items at home.    Be sure to tell your doctor:    If you have any allergies.    If there s any chance you are pregnant.    If you are breastfeeding.     If you have diabetes as your medication may need to be adjusted for this exam.    You will have contrast for this exam. To prepare:    Do not eat or drink for 2 hours before your exam. If you need to take medicine, you may take it with small sips of water. (We may ask you to take liquid medicine as well.)    The day before your exam, drink extra fluids--at least six 8-ounce glasses (unless your doctor tells you to restrict your fluids).    Patients over 70 or patients with diabetes or kidney problems:    If you haven t had a blood test (creatinine test) within the last 30 days, the Cardiologist/Radiologist may require you to get this test prior to your exam.    Please wear loose clothing, such as a sweat suit or jogging clothes. Avoid snaps, zippers and other metal. We may ask you to undress and put on a hospital gown.    If you have any questions, please call [phone].        Pending Results     Date and Time Order Name Status Description    9/3/2018 1024 CSF Culture Aerobic Bacterial Preliminary  "            Statement of Approval     Ordered          09/06/18 1418  I have reviewed and agree with all the recommendations and orders detailed in this document.  EFFECTIVE NOW     Approved and electronically signed by:  Deb Christiansen PA-C             Admission Information     Date & Time Provider Department Dept. Phone    9/3/2018 Matteo Godoy MD [place] [address] [facility] [phone]      Your Vitals Were     Blood Pressure Pulse Temperature Respirations Pulse Oximetry       124/70 (BP Location: Left arm) 76 97.9  F (36.6  C) (Oral) 16 96%       MyChart Information     [facility] gives you secure access to your electronic health record. If you see a primary care provider, you can also send messages to your care team and make appointments. If you have questions, please call your primary care clinic.  If you do not have a primary care provider, please call [phone] and they will assist you.        Care EveryWhere ID     This is your Care EveryWhere ID. This could be used by other organizations to access your [facility] medical records  [id number]        Equal Access to Services     [facility] : Hadii jayne mcdermott Soantoinette, waaxda luqadaha, qaybta kaalmalencho damian, snehal birch . So [facility] [phone].    ATENCIÓN: Si habla español, tiene a alvarado disposición servicios gratuitos de asistencia lingüística. Llame al [phone].    We comply with applicable federal civil rights laws and [place] laws. We do not discriminate on the basis of race, color, national origin, age, disability, sex, sexual orientation, or gender identity.               Review of your medicines      START taking        Dose / Directions    acetaminophen 325 MG tablet   Commonly known as:  TYLENOL   Used for:  Other headache syndrome        Dose:  650 mg   Take 2 tablets (650 mg) by mouth every 4 hours as needed for mild pain   Quantity:  100 tablet   Refills:  0       aspirin 325  MG EC tablet   Used for:  Cerebrovascular accident (CVA) due to other mechanism (H)        Dose:  325 mg   Start taking on:  9/7/2018   Take 1 tablet (325 mg) by mouth daily   Quantity:  60 tablet   Refills:  0       bisacodyl 10 MG Suppository   Commonly known as:  DULCOLAX   Used for:  Constipation, unspecified constipation type        Dose:  10 mg   Place 1 suppository (10 mg) rectally daily as needed for constipation   Quantity:  5 suppository   Refills:  1       senna-docusate 8.6-50 MG per tablet   Commonly known as:  SENOKOT-S;PERICOLACE   Used for:  Constipation, unspecified constipation type        Dose:  1-2 tablet   Take 1-2 tablets by mouth 2 times daily as needed for constipation   Quantity:  100 tablet   Refills:  0         CONTINUE these medicines which have NOT CHANGED        Dose / Directions    buPROPion 150 MG 24 hr tablet   Commonly known as:  WELLBUTRIN XL   Used for:  Major depressive disorder, recurrent episode, moderate (H)        TAKE 1 TABLET (150 MG) BY MOUTH EVERY MORNING - NEEDS APPOINTMENT   Quantity:  90 tablet   Refills:  1       ORDER FOR ALLERGEN IMMUNOTHERAPY 5 mL vial        Refills:  0       order for DME        AIRSENSE 10 7-12 CM/H20 NASAL WISP   Refills:  0       sertraline 100 MG tablet   Commonly known as:  ZOLOFT   Used for:  Major depressive disorder, recurrent episode, moderate (H)        Dose:  100 mg   Take 1 tablet (100 mg) by mouth daily   Quantity:  90 tablet   Refills:  1            Where to get your medicines      These medications were sent to [facility] - [address]  [address], [place] [address]     Phone:  [phone]     acetaminophen 325 MG tablet    aspirin 325 MG EC tablet    bisacodyl 10 MG Suppository    senna-docusate 8.6-50 MG per tablet                Protect others around you: Learn how to safely use, store and throw away your medicines at www.disposemymeds.org.             Medication List: This is a  list of all your medications and when to take them. Check marks below indicate your daily home schedule. Keep this list as a reference.      Medications           Morning Afternoon Evening Bedtime As Needed    acetaminophen 325 MG tablet   Commonly known as:  TYLENOL   Take 2 tablets (650 mg) by mouth every 4 hours as needed for mild pain   Last time this was given:  650 mg on 9/6/2018  2:23 PM                                   aspirin 325 MG EC tablet   Take 1 tablet (325 mg) by mouth daily   Start taking on:  9/7/2018   Last time this was given:  325 mg on 9/6/2018  9:28 AM                                   bisacodyl 10 MG Suppository   Commonly known as:  DULCOLAX   Place 1 suppository (10 mg) rectally daily as needed for constipation   Last time this was given:  10 mg on 9/6/2018 12:07 PM                                   buPROPion 150 MG 24 hr tablet   Commonly known as:  WELLBUTRIN XL   TAKE 1 TABLET (150 MG) BY MOUTH EVERY MORNING - NEEDS APPOINTMENT   Last time this was given:  150 mg on 9/6/2018  9:29 AM                                   ORDER FOR ALLERGEN IMMUNOTHERAPY 5 mL vial                                order for DME   AIRSENSE 10 7-12 CM/H20 NASAL WISP                                senna-docusate 8.6-50 MG per tablet   Commonly known as:  SENOKOT-S;PERICOLACE   Take 1-2 tablets by mouth 2 times daily as needed for constipation   Last time this was given:  2 tablets on 9/5/2018  9:27 PM                                   sertraline 100 MG tablet   Commonly known as:  ZOLOFT   Take 1 tablet (100 mg) by mouth daily   Last time this was given:  100 mg on 9/6/2018  9:29 AM

## 2018-09-04 ENCOUNTER — APPOINTMENT (OUTPATIENT)
Dept: CT IMAGING | Facility: CLINIC | Age: 43
DRG: 299 | End: 2018-09-04
Attending: PSYCHIATRY & NEUROLOGY
Payer: COMMERCIAL

## 2018-09-04 ENCOUNTER — APPOINTMENT (OUTPATIENT)
Dept: OCCUPATIONAL THERAPY | Facility: CLINIC | Age: 43
DRG: 299 | End: 2018-09-04
Attending: HOSPITALIST
Payer: COMMERCIAL

## 2018-09-04 ENCOUNTER — APPOINTMENT (OUTPATIENT)
Dept: SPEECH THERAPY | Facility: CLINIC | Age: 43
DRG: 299 | End: 2018-09-04
Payer: COMMERCIAL

## 2018-09-04 LAB
ANION GAP SERPL CALCULATED.3IONS-SCNC: 9 MMOL/L (ref 3–14)
BUN SERPL-MCNC: 16 MG/DL (ref 7–30)
CALCIUM SERPL-MCNC: 8.6 MG/DL (ref 8.5–10.1)
CHLORIDE SERPL-SCNC: 109 MMOL/L (ref 94–109)
CHOLEST SERPL-MCNC: 166 MG/DL
CO2 SERPL-SCNC: 24 MMOL/L (ref 20–32)
CREAT SERPL-MCNC: 1.01 MG/DL (ref 0.66–1.25)
GFR SERPL CREATININE-BSD FRML MDRD: 81 ML/MIN/1.7M2
GLUCOSE BLDC GLUCOMTR-MCNC: 134 MG/DL (ref 70–99)
GLUCOSE SERPL-MCNC: 98 MG/DL (ref 70–99)
HDLC SERPL-MCNC: 37 MG/DL
LDLC SERPL CALC-MCNC: 109 MG/DL
NONHDLC SERPL-MCNC: 129 MG/DL
POTASSIUM SERPL-SCNC: 4 MMOL/L (ref 3.4–5.3)
SODIUM SERPL-SCNC: 142 MMOL/L (ref 133–144)
TRIGL SERPL-MCNC: 99 MG/DL
TROPONIN I SERPL-MCNC: <0.015 UG/L (ref 0–0.04)

## 2018-09-04 PROCEDURE — 36415 COLL VENOUS BLD VENIPUNCTURE: CPT | Performed by: PHYSICIAN ASSISTANT

## 2018-09-04 PROCEDURE — 84484 ASSAY OF TROPONIN QUANT: CPT | Performed by: HOSPITALIST

## 2018-09-04 PROCEDURE — 40000133 ZZH STATISTIC OT WARD VISIT

## 2018-09-04 PROCEDURE — 70450 CT HEAD/BRAIN W/O DYE: CPT

## 2018-09-04 PROCEDURE — 40000225 ZZH STATISTIC SLP WARD VISIT: Performed by: SPEECH-LANGUAGE PATHOLOGIST

## 2018-09-04 PROCEDURE — 97535 SELF CARE MNGMENT TRAINING: CPT | Mod: GO

## 2018-09-04 PROCEDURE — 80048 BASIC METABOLIC PNL TOTAL CA: CPT | Performed by: PHYSICIAN ASSISTANT

## 2018-09-04 PROCEDURE — 25000132 ZZH RX MED GY IP 250 OP 250 PS 637: Performed by: HOSPITALIST

## 2018-09-04 PROCEDURE — 12000000 ZZH R&B MED SURG/OB

## 2018-09-04 PROCEDURE — 36415 COLL VENOUS BLD VENIPUNCTURE: CPT | Performed by: HOSPITALIST

## 2018-09-04 PROCEDURE — 92526 ORAL FUNCTION THERAPY: CPT | Mod: GN | Performed by: SPEECH-LANGUAGE PATHOLOGIST

## 2018-09-04 PROCEDURE — 94660 CPAP INITIATION&MGMT: CPT

## 2018-09-04 PROCEDURE — 25000128 H RX IP 250 OP 636: Performed by: HOSPITALIST

## 2018-09-04 PROCEDURE — 99232 SBSQ HOSP IP/OBS MODERATE 35: CPT | Performed by: PHYSICIAN ASSISTANT

## 2018-09-04 PROCEDURE — 97165 OT EVAL LOW COMPLEX 30 MIN: CPT | Mod: GO

## 2018-09-04 PROCEDURE — 80061 LIPID PANEL: CPT | Performed by: PHYSICIAN ASSISTANT

## 2018-09-04 PROCEDURE — 25000128 H RX IP 250 OP 636: Performed by: PSYCHIATRY & NEUROLOGY

## 2018-09-04 PROCEDURE — 40000275 ZZH STATISTIC RCP TIME EA 10 MIN

## 2018-09-04 RX ORDER — SODIUM CHLORIDE 9 MG/ML
INJECTION, SOLUTION INTRAVENOUS CONTINUOUS
Status: DISCONTINUED | OUTPATIENT
Start: 2018-09-04 | End: 2018-09-05

## 2018-09-04 RX ADMIN — SODIUM CHLORIDE: 9 INJECTION, SOLUTION INTRAVENOUS at 04:24

## 2018-09-04 RX ADMIN — SERTRALINE HYDROCHLORIDE 100 MG: 100 TABLET ORAL at 08:33

## 2018-09-04 RX ADMIN — ACETAMINOPHEN 650 MG: 325 TABLET, FILM COATED ORAL at 02:34

## 2018-09-04 RX ADMIN — SODIUM CHLORIDE, PRESERVATIVE FREE: 5 INJECTION INTRAVENOUS at 13:24

## 2018-09-04 RX ADMIN — SODIUM CHLORIDE, PRESERVATIVE FREE: 5 INJECTION INTRAVENOUS at 14:38

## 2018-09-04 RX ADMIN — ASPIRIN 325 MG: 325 TABLET, DELAYED RELEASE ORAL at 08:33

## 2018-09-04 RX ADMIN — ATORVASTATIN CALCIUM 40 MG: 40 TABLET, FILM COATED ORAL at 20:32

## 2018-09-04 RX ADMIN — ACETAMINOPHEN 650 MG: 325 TABLET, FILM COATED ORAL at 08:33

## 2018-09-04 RX ADMIN — ACETAMINOPHEN 650 MG: 325 TABLET, FILM COATED ORAL at 17:46

## 2018-09-04 RX ADMIN — BUPROPION HYDROCHLORIDE 150 MG: 150 TABLET, FILM COATED, EXTENDED RELEASE ORAL at 08:33

## 2018-09-04 RX ADMIN — ACETAMINOPHEN 650 MG: 325 TABLET, FILM COATED ORAL at 13:21

## 2018-09-04 RX ADMIN — SENNOSIDES AND DOCUSATE SODIUM 2 TABLET: 8.6; 5 TABLET ORAL at 18:12

## 2018-09-04 ASSESSMENT — VISUAL ACUITY
OU: NORMAL ACUITY;GLASSES
OU: NORMAL ACUITY;GLASSES
OU: NORMAL ACUITY
OU: NORMAL ACUITY;GLASSES
OU: NORMAL ACUITY;GLASSES
OU: NORMAL ACUITY
OU: NORMAL ACUITY;GLASSES

## 2018-09-04 ASSESSMENT — ACTIVITIES OF DAILY LIVING (ADL)
PREVIOUS_RESPONSIBILITIES: MEAL PREP;HOUSEKEEPING;LAUNDRY;SHOPPING;YARDWORK;MEDICATION MANAGEMENT;FINANCES;DRIVING;WORK
ADLS_ACUITY_SCORE: 7
ADLS_ACUITY_SCORE: 9
ADLS_ACUITY_SCORE: 9
ADLS_ACUITY_SCORE: 7
ADLS_ACUITY_SCORE: 8
ADLS_ACUITY_SCORE: 7

## 2018-09-04 NOTE — PROGRESS NOTES
[facility] - Suicide Severity Rating Scale Completed? Yes  If yes, what color did the patient score? White

## 2018-09-04 NOTE — PLAN OF CARE
Problem: Patient Care Overview  Goal: Plan of Care/Patient Progress Review  Outcome: Improving  A&O x4. Slurred speech, word finding difficulty, otherwise Neuros intact. VSS. Tele ST. Regular diet. Up with SBA. Complained of headache rated 1-5/10, decreased with prn tylenol and cold packs. Plan for CT and MRA in am, nursing to continue to monitor.

## 2018-09-04 NOTE — PROGRESS NOTES
09/04/18 1012   Quick Adds   Type of Visit Initial Occupational Therapy Evaluation   Living Environment   Lives With spouse   Living Arrangements house   Home Accessibility bed and bath on same level   Number of Stairs to Enter Home 4   Number of Stairs Within Home 10   Transportation Available car   Living Environment Comment tub shower on first floor, walk-in shower on second floor.    Self-Care   Dominant Hand right   Usual Activity Tolerance good   Current Activity Tolerance moderate   Regular Exercise no   Functional Level Prior   Ambulation 0-->independent   Transferring 0-->independent   Toileting 0-->independent   Bathing 0-->independent   Dressing 0-->independent   Eating 0-->independent   Communication 0-->understands/communicates without difficulty   Swallowing 0-->swallows foods/liquids without difficulty   Cognition 0 - no cognition issues reported   Fall history within last six months no   Which of the above functional risks had a recent onset or change? 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, left PCA and additional scattered posterior circulation stroke   Precautions/Limitations no known precautions/limitations   Cognitive Status Examination   Orientation orientation to person, place and time   Level of Consciousness alert   Able to Follow Commands WNL/WFL   Personal Safety (Cognitive) WNL/WFL   Memory intact   Attention No deficits were identified   Organization/Problem Solving No deficits were identified   Executive Function No deficits were identified   Visual Perception   Visual Perception Wears glasses   Visual Field R visual field deficit   Sensory Examination   Sensory  Quick Adds No deficits were identified   Pain Assessment   Patient Currently in Pain No   Integumentary/Edema   Integumentary/Edema no deficits were identifed   Posture   Posture not impaired   Range of Motion (ROM)   ROM Quick Adds No deficits were identified   Strength   Manual Muscle Testing Quick Adds No deficits were identified   Muscle Tone Assessment   Muscle Tone Quick Adds No deficits were identified   Coordination   Upper Extremity Coordination No deficits were identified   Mobility   Bed Mobility Bed mobility skill: Sit to supine;Bed mobility skill: Supine to sit   Bed Mobility Skill: Sit to Supine   Level of [place]: Sit/Supine independent   Bed Mobility Skill: Supine to Sit   Level of [place]: Supine/Sit independent   Transfer Skill: Sit to Stand   Level of [place]: Sit/Stand stand-by assist   Transfer Skill: Toilet Transfer   Level of [place]: Toilet stand-by assist   Balance   Balance Quick Add No deficits identified   Upper Body Dressing   Level of [place]: Dress Upper Body independent   Lower Body Dressing   Level of [place]: Dress Lower Body independent   Instrumental Activities of Daily Living (IADL)   Previous Responsibilities meal prep;housekeeping;laundry;shopping;yardwork;medication management;finances;driving;work   General Therapy Interventions   Planned Therapy Interventions visual perception   Clinical Impression   Criteria for Skilled Therapeutic Interventions Met yes, treatment indicated   OT Diagnosis decreased R visual field due to inattention   Influenced by the following impairments stroke   Assessment of Occupational Performance 1-3 Performance Deficits   Identified Performance Deficits R visual field deficit   Clinical Decision Making (Complexity) Low complexity   Therapy Frequency (1 tx only)   Anticipated Discharge Disposition Home with Assist   Risks and Benefits of Treatment have been explained. Yes   Patient, Family & other staff in agreement with  "plan of care Yes   [facility] TM \"6 Clicks\"   2016, Trustees of [facility], under license to [employer].  All rights reserved.   6 Clicks Short Forms Daily Activity Inpatient Short Form   [facility]  \"6 Clicks\" Daily Activity Inpatient Short Form   1. Putting on and taking off regular lower body clothing? 4 - None   2. Bathing (including washing, rinsing, drying)? 4 - None   3. Toileting, which includes using toilet, bedpan or urinal? 4 - None   4. Putting on and taking off regular upper body clothing? 4 - None   5. Taking care of personal grooming such as brushing teeth? 4 - None   6. Eating meals? 4 - None   Daily Activity Raw Score (Score out of 24.Lower scores equate to lower levels of function) 24   Total Evaluation Time   Total Evaluation Time (Minutes) 12     "

## 2018-09-04 NOTE — PLAN OF CARE
Problem: Patient Care Overview  Goal: Plan of Care/Patient Progress Review  Discharge Planner SLP   Patient plan for discharge: He did not state.   Current status: Patient was seen for swallow treatment willing to have a snack at bedside. He was able to tolerate solid with good mastication and bolus extraction with banana bread. He tolerated thin liquids via the cup on single swallows but suspect possible penetration on onsecutive trials. Patient met swallow goals and currently tolerating a regular diet with thin liquids while implementing swallow strategies. Recommend: 1. Continue on a regular diet and thin. 2. Upright, avoid straws, small bites and single swallows of liquids.   Barriers to return to prior living situation: None.   Recommendations for discharge: Home with OP ST.   Rationale for recommendations: Patient met his swallow goal. If discharging soon 1-2 days will defer speech/language evaluation to the next level of care.        Entered by: Deb Ramirez 09/04/2018 1:06 PM

## 2018-09-04 NOTE — PLAN OF CARE
Problem: Patient Care Overview  Goal: Plan of Care/Patient Progress Review  Outcome: No Change  A&Ox4. Neuros unchanged: Word finding difficulty and headache otherwise intact. VSS. Tele NSR. Up with SBA. Regular diet. Voiding adequately. Headache decreased with cold pack and tyelnol. Plan for Head CT in AM. Discharge pending.

## 2018-09-04 NOTE — PROGRESS NOTES
[facility]    Hospitalist Progress Note    Assessment & Plan   Mr. Vahe Haas is a 43-year-old male with anxiety, depression and allergic rhinitis, JANA who was brought to ED with complaints of headache, nausea and intermittent confusion and found to have with a right vertebral artery dissection with acute CVAs, admitted on 9/3/18 for further management.     Right vertebral artery dissection/occlusion   Acute CVAs: Rt/Lt cerebellum and Lt posterior temporal-occipital region  Presented with HA, nausea, and intermittent confusion.   Brain MRI 9/3/18: without bleed or mass effect, multiple areas of restricted diffusion including lesion in both Rt/Lt cerebellum and in Lt posterior temporal-occipital region c/w acute infarcts.   MRA neck 9/3/18: mild-mod focal stenosis in V3 segment of distal Rt vertebral artery. Blood flow improved to Rt vertebral artery from prior CTA. No stenosis at carotid bifurcations.   Repeat Head CT 9/4/18: Expected evolution of bilateral posterior circulation infarcts. No evidence of hemorrhage or hydrocephalus.  HbA1c 5.6%. Echo cancelled by neurology since pt with (+) MRA. BMP unremarkable. Lipid panel not fasting 9/4/18: , HDL 37, , TG 99.  - neuro telemetry   - Neuro checks every 4 hours,   - Seen by SLP and cleared for regular diet with thin liquids and GERD precautions, ordered  - PT evaluation pending  - OT recommending home with assist from spouse/parents and outpatient OT for visual assessment/intervention.  - neurology on board, to see again today for further recommendations  - Started on aspirin 325 mg daily   - started on Lipitor 40 mg daily  - Discontinue IV fluids as pt is eating    Elevated blood pressure, improving    No hx of HTN. No prior antihypertensives. SBPs 118-148. HR 90s-100.  - Allowing permissive hypertension.    - Labetalol p.r.n. per stroke protocol  - Monitor need to initiate antihypertensive, expect transient HTN related to  CVAs    Anxiety/depression, we will resume his prior to admission Zoloft and Wellbutrin.     Obstructive sleep apnea, continue with CPAP.     # Pain Assessment:  Current Pain Score 9/4/2018   Patient currently in pain? -   Pain score (0-10) 3   Pain location -   Pain descriptors Headache   - Vahe is experiencing pain due to acute CVAs. Pain management was discussed with Vaeh and his family and the plan was created in a collaborative fashion.  Vahe's response to the current recommendations: compliant  - Please see the plan for pain management as documented above    DVT Prophylaxis: Pneumatic Compression Devices  Code Status: Full Code    Disposition: Expected discharge pending neurology evaluation and clinical improvement.    This patient was discussed with Dr. Smith of the Hospitalist Service who agrees with current plans as outlined above.    JULIETTE Rodriguez-SHIRIN  Text Page (7am to 6pm)  Interval History   VSS. Allowing mild permissive hypertension given strokes. Feels well, c/o very mild frontal HA. Ate bfast this AM. Appetite decreased but eating ok.     -Data reviewed today: I reviewed all new labs and imaging results over the last 24 hours. I personally reviewed no images or EKG's today.    Physical Exam   Temp: 98.2  F (36.8  C) Temp src: Oral BP: 130/74   Heart Rate: 83 Resp: 16 SpO2: 94 % O2 Device: None (Room air)    There were no vitals filed for this visit.  Vital Signs with Ranges  Temp:  [97.6  F (36.4  C)-98.8  F (37.1  C)] 98.2  F (36.8  C)  Heart Rate:  [] 83  Resp:  [6-19] 16  BP: (118-148)/(64-87) 130/74  SpO2:  [92 %-96 %] 94 %  I/O last 3 completed shifts:  In: 1433.33 [P.O.:100; I.V.:1333.33]  Out: -     Constitutional: Awake, alert, cooperative, no apparent distress  Respiratory: Clear to auscultation bilaterally, no crackles or wheezing. Anterior fields assessed.  Cardiovascular: Regular rate and rhythm, normal S1 and S2, and no murmur noted  GI: Normal bowel sounds, soft,  non-distended, non-tender  Skin/Integumen: No rashes, no cyanosis, no edema  Neuro: No pronator drift, CN 2-12 grossly intact. Rt neglect Rt upper worse than Rt lower. BUE/BLE strength 5/5. Fluent speech, no dysarthria. Sensory intact.     Medications     - MEDICATION INSTRUCTIONS -       sodium chloride 100 mL/hr at 09/04/18 0424       aspirin  325 mg Oral Daily    Or     aspirin  300 mg Rectal Daily     atorvastatin  40 mg Oral or NG Tube Daily at 8 pm     buPROPion  150 mg Oral Daily     sertraline  100 mg Oral Daily       Data     Recent Labs  Lab 09/04/18  1040 09/04/18  0100 09/03/18  0934   WBC  --   --  7.5   HGB  --   --  15.4   MCV  --   --  85   PLT  --   --  156     --  143   POTASSIUM 4.0  --  3.5   CHLORIDE 109  --  109   CO2 24  --  25   BUN 16  --  16   CR 1.01  --  1.04   ANIONGAP 9  --  9   KHADIJAH 8.6  --  8.0*   GLC 98  --  158*   ALBUMIN  --   --  3.7   PROTTOTAL  --   --  6.9   BILITOTAL  --   --  0.3   ALKPHOS  --   --  81   ALT  --   --  29   AST  --   --  22   TROPI  --  <0.015  --        Imaging:   Recent Results (from the past 24 hour(s))   CTA Head Neck with Contrast   Result Value    Radiologist flags Vascular occlusion (AA)    Narrative    CT ANGIOGRAM OF THE HEAD AND NECK WITH CONTRAST  9/3/2018 1:27 PM     HISTORY: Headache, new onset, evaluate dissection, aneurysm, etc.      TECHNIQUE: CT angiography with an injection of 70 mL [id number]-370 IV  with scans through the head and neck. Images were transferred to a  separate 3-D workstation where multiplanar reformations and 3-D images  were created. Estimates of carotid stenoses are made relative to the  distal internal carotid artery diameters except as noted. Radiation  dose for this scan was reduced using automated exposure control,  adjustment of the mA and/or kV according to patient size, or iterative  reconstruction technique.    COMPARISON: Same day head CT     CT HEAD FINDINGS: No contrast enhancing lesions are identified.  Focal  groundglass 1 cm lesion within the left frontal bone likely represents  fibro-osseous lesion or fibrous dysplasia, unchanged.    CT ANGIOGRAM HEAD FINDINGS: The intracranial left vertebral artery,  basilar artery, and posterior cerebral arteries are patent. The  bilateral intracranial internal carotid arteries, middle cerebral  arteries, and anterior cerebral arteries are patent. No aneurysms are  identified. Complete [place] of  is present. Dural venous sinuses  are unremarkable. Right transverse sinus is dominant with diminutive  left transverse sinus.    CT ANGIOGRAM NECK FINDINGS: A 2 vessel aortic arch is present. The  bilateral common carotid, internal carotid, external carotid, and left  vertebral arteries are patent. The right vertebral artery is occluded.  The origin is not well-visualized, however there is asymmetrically  decreased small-volume contrast opacification throughout the mid V2  segment with ultimate reocclusion at the V2-3 junction through the V3  segment. There is trace filling of the V4 segment. Likely combination  right-sided AICA/PICA artery is present, however there is  asymmetrically decreased contrast opacification of the right posterior  inferior cerebellar artery branches.    There are mild degenerative changes of the cervical spine with  degenerative disc disease most severe at C5-6.       Impression    IMPRESSION: Right vertebral artery dissection/occlusion.    [Critical Result: Vascular occlusion]    Finding was identified on 9/3/2018 1:31 PM.     Dr. Rosen was contacted by me on 9/3/2018 1:38 PM and verbalized  understanding of the critical result.     GEORGETTE SCOTT MD   MRI Brain w & w/o contrast    Narrative    MRI BRAIN WITHOUT AND WITH CONTRAST  9/3/2018 4:47 PM    HISTORY:  Stroke with SWI Sequence.       TECHNIQUE:  Multiplanar, multisequence MRI of the brain without and  with 11mL Gadavist.     COMPARISON: CT studies dated 9/3/2018.     FINDINGS:  Diffusion  weighted images reveal multiple areas of  restricted diffusion in the right cerebellum. Areas of restricted  diffusion are seen in the inferior, mid and superior right cerebellum.  There is also an area of restricted diffusion in the left mid and left  superior cerebellum. There is an area of restricted diffusion in the  left posterior and medial temporal lobe and left occipital lobe. This  is in the distribution of the left posterior cerebral artery. All of  these areas of restricted diffusion are consistent with acute  infarcts. There is no hemorrhage or mass effect.  There are no  gadolinium enhancing lesions.   The facial structures appear normal.  The arteries at the base of the brain and the dural venous sinuses  appear patent.       Impression    IMPRESSION:   1. Multiple areas of restricted diffusion including lesions in both  the right and left cerebellum and in the left posterior  temporal-occipital region. These areas of restricted diffusion are  consistent with acute infarcts.  2. No bleed or mass effect.    EVAN STEIN MD   MRA Neck (Carotids) wo & w Contrast    Narrative    MRA NECK (CAROTIDS) WO & W CONTRAST 9/3/2018 7:37 PM     HISTORY:  presumed dissection, T1 fat sat to confirm diagnosis.;     TECHNIQUE:  Sequential axial images of the neck were obtained using  2-dimensional time-of-flight before contrast and 3-dimensional  time-of-flight after the uneventful administration of 10mL Gadavist iv  contrast.    COMPARISON:  None.    FINDINGS: On the post gadolinium images, the posterior aspects of the  vertebral arteries are not included at the C1 level. Stenosis is  relative to the distal internal carotid diameter.    Right Carotid:  No significant stenosis is seen at the bifurcation  relative to the distal internal carotid diameter.    Left Carotid:  No significant stenosis is seen at the bifurcation  relative to the distal internal carotid diameter.    Vertebrals: Antegrade flow is seen in both  vertebral arteries. Left  vertebral artery is dominant. There is a mild-moderate focal stenosis  in the V3 segment of the right distal vertebral artery. Blood flow in  the right vertebral artery has improved when compared to the CT  angiogram. I do not identify any definite intramural hematoma on the  axial T1 with fat-sat images.      Impression    IMPRESSION:   1. Mild-moderate focal stenosis in the V3 segment of the distal right  vertebral artery. Blood flow in the right vertebral artery has  improved when compared to the earlier CT angiogram. No focal  intramural hematoma is identified. No definite arterial dissection is  identified.  2. No stenosis at the carotid bifurcations.    EVAN STEIN MD   CT Head w/o Contrast    Narrative    CT SCAN OF THE HEAD WITHOUT CONTRAST   9/4/2018 8:59 AM     HISTORY: Cerebellar stroke, evaluate edema.     TECHNIQUE:  Axial images of the head and coronal reformations without  IV contrast material. Radiation dose for this scan was reduced using  automated exposure control, adjustment of the mA and/or kV according  to patient size, or iterative reconstruction technique.    COMPARISON: MRI 9/3/2018, head CT 9/3/2018    FINDINGS:  Hypoattenuation and loss of gray-white differentiation  involving the left posterior thalamus, medial temporal lobe, and  occipital lobe are present corresponding to areas of diffusion  restriction on prior MRI. Patchy areas of hypoattenuation throughout  the bilateral cerebellum, right worse than left, are present  corresponding to areas of diffusion ejection on prior MRI. No evidence  of intracranial hemorrhage. The fourth ventricle remains patent. No  evidence of hydrocephalus. The remaining parenchyma is unremarkable.  No new areas of ischemia identified. The visualized calvarium, skull  base, paranasal sinuses, and extracranial soft tissues are  unremarkable.      Impression    IMPRESSION:  Expected evolution of bilateral posterior  circulation  infarcts. No evidence of hemorrhage or hydrocephalus.    GEORGETTE SCOTT MD

## 2018-09-04 NOTE — PLAN OF CARE
Problem: Patient Care Overview  Goal: Plan of Care/Patient Progress Review  Discharge Planner PT   Patient plan for discharge: home with A from [facility]  Current status: Order received, chart reviewed, eval completed. Pt admitted for HA and vomiting MRI/CT revealed right vertebral artery dissection with occlusion in the V3 segment and R PICA infarct, left PCA and additional scattered posterior circulation stroke. Pt lives in house with spouse, all needs met on main level. Pt reports I with all ADL/IADL's prior including driving and work. Pt completed bed mobility supine <> sit I. I with LE dressing. SBA for toilet transfer without AE. Screened for PT: SBA for functional ambulation in hallway with no LOB, no PT indicated. Pt reports feeling back to baseline with mobility. R field deficit noted, OT educated on visual scanning strategies. OT educated on energy conservation and return to work/daily routine. No further OT concerns at this time. Discontinue OT services as pt does not have acute needs.   Barriers to return to prior living situation: none   Recommendations for discharge: Home with A from spouse and parents with IADL's as needed. OP OT for visual assessment/intervention   Rationale for recommendations: Pt reports feeling near baseline with functional ambulation. Pt reports spouse and parents will be able to A at all times/when needed. Parents are retired and live 1.5 miles away. Pt demonstrates good insight into deficits and anticipate pt will continue to progress with I as HA improves.        Entered by: Reena Washington 09/04/2018 10:40 AM     Occupational Therapy Discharge Summary    Reason for therapy discharge:    All goals and outcomes met, no further needs identified.    Progress towards therapy goal(s). See goals on [facility] in [facility] record for goal details.  Goals met    Therapy recommendation(s):    Continued therapy is recommended.  Rationale/Recommendations:  OP OT for visual  assessment/intervention.

## 2018-09-04 NOTE — PLAN OF CARE
Problem: Patient Care Overview  Goal: Plan of Care/Patient Progress Review  Discharge Planner PT   Patient plan for discharge: See OT note  Current status: Per OT, pt is at baseline for functional mobility, no IP PT needs   Barriers to return to prior living situation: See OT note  Recommendations for discharge: See OT note  Rationale for recommendations: No skilled PT intervention indicated. PT to complete orders          Entered by: Sharron House 09/04/2018 12:15 PM

## 2018-09-04 NOTE — PLAN OF CARE
Problem: Patient Care Overview  Goal: Plan of Care/Patient Progress Review  Outcome: Improving  A&O, forgetful and d/o to situation at times. Neuors: R field cut and slow and deliberate finger to nose on RUE. VSS. Tele NSR. SBA. Regular diet. C/o postural headache, tylenol given and IVF at 200ml/hr (neuro and hospitalist aware). Plan to monitor pt closely overnight. Possible discharge home tomorrow with OP SLP and OT.

## 2018-09-04 NOTE — PROGRESS NOTES
Vascular Neurology Progress Note    ____________________________________________________________    Admission Summary:  Vahe Haas is a 43 year old male with depression, JANA, who presents with right vertebral artery dissection with occlusion in the V3 segment and R PICA infarct, left PCA and additional scattered posterior circulation stroke.   He initially presented with vomiting and worst HA of his life.  He had severe coughing recently due to a cold. He had a negative Head CT in the ED followed by unremarkable LP: W7, R8, P41, G63.   His exam has improved, he is fairly amnestic to his initial presentation.      MRA Neck with fat suppression without evidence of intramural thrombus--recanalization of occluded vert with focal R v3 stenosis.  Follow-up Head CT 9/4 no significant swelling or new infarction.     ROS: severe coughing with a cold two weeks ago with some initial headache/neck pain at that time.        Stroke/TIA Evaluation summarized  MRI/Head CT: as above  Vessel imaging: probable dissection, MRI with T1 fat suppression without intramural hematoma    A1c 5.6  Telemetry: unrevealing.       Impression:  1. Vertebral artery dissection with left PCA and right PICA infarcts, additional scattered post circ stroke     Recommendations  1. q4 neurochecks  2. Aspirin 325mg daily  3. Echocardiogram  4. Repeat Head CT in am to eval for progression posterior fossa edema  5. Telemetry  6. Speech/PT/OT     Please contact the stroke service with any questions: link to Text page or feel free to call my cellphone.  I personally reviewed all relevant labs and neuroimaging.  I spent 30 minutes reviewing labs, diagnostic studies, neuroimaging, and evaluating the patient.    Alvin Fallon MD, MS  Vascular Neurology  September 4, 2018    ____________________________________________________________________        Medications:      Current Facility-Administered Medications:      acetaminophen (TYLENOL) tablet 650  mg, 650 mg, Oral, Q4H PRN, Matteo Godoy MD, 650 mg at 09/04/18 1321     aspirin EC tablet 325 mg, 325 mg, Oral, Daily, 325 mg at 09/04/18 0833 **OR** aspirin Suppository 300 mg, 300 mg, Rectal, Daily, Matteo Godoy MD     atorvastatin (LIPITOR) tablet 40 mg, 40 mg, Oral or NG Tube, Daily at 8 pm, Matteo Godoy MD, 40 mg at 09/03/18 2057     bisacodyl (DULCOLAX) Suppository 10 mg, 10 mg, Rectal, Daily PRN, Matteo Godoy MD     buPROPion (WELLBUTRIN XL) 24 hr tablet 150 mg, 150 mg, Oral, Daily, Matteo Godoy MD, 150 mg at 09/04/18 0833     HYDROcodone-acetaminophen (NORCO) 5-325 MG per tablet 1-2 tablet, 1-2 tablet, Oral, Q4H PRN, Matteo Godoy MD     HYDROmorphone (PF) (DILAUDID) injection 0.3-0.5 mg, 0.3-0.5 mg, Intravenous, Q2H PRN, Matteo Godoy MD     labetalol (NORMODYNE/TRANDATE) injection 10-40 mg, 10-40 mg, Intravenous, Q10 Min PRN, Matteo Godoy MD     magnesium hydroxide (MILK OF MAGNESIA) suspension 30 mL, 30 mL, Oral, Daily PRN, Matteo Godoy MD     Medication Instruction, , Does not apply, Continuous PRN, Matteo oGdoy MD     melatonin tablet 1 mg, 1 mg, Oral, At Bedtime PRN, Matteo Godoy MD     naloxone (NARCAN) injection 0.1-0.4 mg, 0.1-0.4 mg, Intravenous, Q2 Min PRN, Matteo Godoy MD     ondansetron (ZOFRAN-ODT) ODT tab 4 mg, 4 mg, Oral, Q6H PRN **OR** ondansetron (ZOFRAN) injection 4 mg, 4 mg, Intravenous, Q6H PRN, Matteo Godoy MD     prochlorperazine (COMPAZINE) injection 10 mg, 10 mg, Intravenous, Q6H PRN **OR** prochlorperazine (COMPAZINE) tablet 10 mg, 10 mg, Oral, Q6H PRN **OR** prochlorperazine (COMPAZINE) Suppository 25 mg, 25 mg, Rectal, Q12H PRN, Matteo Godoy MD     senna-docusate (SENOKOT-S;PERICOLACE) 8.6-50 MG per tablet 1 tablet, 1 tablet, Oral, BID PRN **OR** senna-docusate (SENOKOT-S;PERICOLACE) 8.6-50 MG per tablet 2 tablet, 2 tablet, Oral, BID PRN, Matteo Godoy  MD Francesco     sertraline (ZOLOFT) tablet 100 mg, 100 mg, Oral, Daily, Matteo Godoy MD, 100 mg at 09/04/18 0833     sodium chloride 0.9% infusion, , Intravenous, Continuous, Latoshaib, Alvin Davalos MD, Last Rate: 200 mL/hr at 09/04/18 1438      [employer] Stroke Scale  Exam Interval: 24 hours post onset of symptom +/- 20 minutes   Score    Level of consciousness: (0)   Alert, keenly responsive    LOC questions: (0)   Answers both questions correctly    LOC commands: (0)   Performs both tasks correctly    Best gaze: (0)   Normal    Visual: (2)   R VF cut    Facial palsy: (0)   Normal symmetrical movements    Motor arm (left): (0)   No drift    Motor arm (right): (0)   No drift    Motor leg (left): (0)   No drift    Motor leg (right): (0)   No drift    Limb ataxia: (0)   Absent    Sensory: (0)   Normal- no sensory loss    Best language: (1)   Mild to moderate aphasia    Dysarthria: (1)   Mild to moderate dysarthria    Extinction and inattention: (0)   No abnormality        Total Score:  4       Vital Signs:  B/P: 132/86, T: 98, P: 76, R: 18    /86 (BP Location: Left arm)  Pulse 76  Temp 98  F (36.7  C) (Oral)  Resp 18  SpO2 95%    Neuro:  Mental status: Awake, alert, attentive, oriented x3. Speech is dysarthric with word-finding difficulties and word substitutions--improving.  Follows simple commands.    No neglect.  Cranial nerves: EOMI, R VF cutl, face symmetric, facial sensation intact, shoulder shrug strong, tongue/uvula midline, hearing intact to conversation.  Motor: 5/5 strength in all 4 extremities without drift.  Sensory): Intact to light touch in face, arms, and legs  Coordination: Finger to nose intact bilaterally without dysmetria.  Normal heel-shin test bilaterally  Gait: Deferred        Labs/Studies:  CBC:     Recent Labs  Lab 09/03/18  0934   WBC 7.5   RBC 5.10   HGB 15.4   HCT 43.5        Basic Metabolic Panel:   Recent Labs   Lab Test  09/04/18   1040   09/03/18   0934  02/17/16   1202   NA  142  143  142   POTASSIUM  4.0  3.5  3.9   CHLORIDE  109  109  108   CO2  24  25  23   BUN  16  16  18   CR  1.01  1.04  1.02   GLC  98  158*  97   KHADIJAH  8.6  8.0*  8.9     Liver panel:  Recent Labs   Lab Test  09/03/18   0934   PROTTOTAL  6.9   ALBUMIN  3.7   BILITOTAL  0.3   ALKPHOS  81   AST  22   ALT  29     INR:No lab results found.   Lipid Profile:  Recent Labs   Lab Test  09/04/18   1040  02/17/16   1202  02/13/13   1116  03/16/12   1113   CHOL  166  187  170  193   HDL  37*  30*  29*  34*   LDL  109*  124*  122  145*   TRIG  99  167*  98  67   CHOLHDLRATIO   --    --   5.9*  5.6*     A1C:   Recent Labs   Lab Test  09/03/18   0934   A1C  5.6     Troponin I:   Recent Labs   Lab Test  09/04/18   0100   TROPI  <0.015         Imaging:  Relevant findings as per the Impression above.

## 2018-09-05 ENCOUNTER — APPOINTMENT (OUTPATIENT)
Dept: CT IMAGING | Facility: CLINIC | Age: 43
DRG: 299 | End: 2018-09-05
Attending: INTERNAL MEDICINE
Payer: COMMERCIAL

## 2018-09-05 ENCOUNTER — APPOINTMENT (OUTPATIENT)
Dept: CARDIOLOGY | Facility: CLINIC | Age: 43
DRG: 299 | End: 2018-09-05
Attending: PSYCHIATRY & NEUROLOGY
Payer: COMMERCIAL

## 2018-09-05 ENCOUNTER — APPOINTMENT (OUTPATIENT)
Dept: SPEECH THERAPY | Facility: CLINIC | Age: 43
DRG: 299 | End: 2018-09-05
Payer: COMMERCIAL

## 2018-09-05 PROCEDURE — 92523 SPEECH SOUND LANG COMPREHEN: CPT | Mod: GN | Performed by: SPEECH-LANGUAGE PATHOLOGIST

## 2018-09-05 PROCEDURE — 25000132 ZZH RX MED GY IP 250 OP 250 PS 637: Performed by: PHYSICIAN ASSISTANT

## 2018-09-05 PROCEDURE — 99232 SBSQ HOSP IP/OBS MODERATE 35: CPT | Performed by: PSYCHIATRY & NEUROLOGY

## 2018-09-05 PROCEDURE — 12000000 ZZH R&B MED SURG/OB

## 2018-09-05 PROCEDURE — 40000225 ZZH STATISTIC SLP WARD VISIT: Performed by: SPEECH-LANGUAGE PATHOLOGIST

## 2018-09-05 PROCEDURE — 99232 SBSQ HOSP IP/OBS MODERATE 35: CPT | Performed by: PHYSICIAN ASSISTANT

## 2018-09-05 PROCEDURE — 93306 TTE W/DOPPLER COMPLETE: CPT

## 2018-09-05 PROCEDURE — 93306 TTE W/DOPPLER COMPLETE: CPT | Mod: 26 | Performed by: INTERNAL MEDICINE

## 2018-09-05 PROCEDURE — 70450 CT HEAD/BRAIN W/O DYE: CPT

## 2018-09-05 PROCEDURE — 25000132 ZZH RX MED GY IP 250 OP 250 PS 637: Performed by: HOSPITALIST

## 2018-09-05 PROCEDURE — 25000128 H RX IP 250 OP 636: Performed by: PSYCHIATRY & NEUROLOGY

## 2018-09-05 RX ORDER — CETIRIZINE HYDROCHLORIDE 5 MG/1
5 TABLET ORAL DAILY
Status: DISCONTINUED | OUTPATIENT
Start: 2018-09-05 | End: 2018-09-06 | Stop reason: HOSPADM

## 2018-09-05 RX ADMIN — ASPIRIN 325 MG: 325 TABLET, DELAYED RELEASE ORAL at 08:35

## 2018-09-05 RX ADMIN — ACETAMINOPHEN 650 MG: 325 TABLET, FILM COATED ORAL at 12:52

## 2018-09-05 RX ADMIN — SENNOSIDES AND DOCUSATE SODIUM 2 TABLET: 8.6; 5 TABLET ORAL at 21:27

## 2018-09-05 RX ADMIN — ACETAMINOPHEN 650 MG: 325 TABLET, FILM COATED ORAL at 21:28

## 2018-09-05 RX ADMIN — SERTRALINE HYDROCHLORIDE 100 MG: 100 TABLET ORAL at 08:35

## 2018-09-05 RX ADMIN — ACETAMINOPHEN 650 MG: 325 TABLET, FILM COATED ORAL at 09:19

## 2018-09-05 RX ADMIN — SODIUM CHLORIDE, PRESERVATIVE FREE: 5 INJECTION INTRAVENOUS at 05:56

## 2018-09-05 RX ADMIN — ATORVASTATIN CALCIUM 40 MG: 40 TABLET, FILM COATED ORAL at 19:49

## 2018-09-05 RX ADMIN — ACETAMINOPHEN 650 MG: 325 TABLET, FILM COATED ORAL at 01:17

## 2018-09-05 RX ADMIN — CETIRIZINE HYDROCHLORIDE 5 MG: 5 TABLET ORAL at 12:48

## 2018-09-05 RX ADMIN — BUPROPION HYDROCHLORIDE 150 MG: 150 TABLET, FILM COATED, EXTENDED RELEASE ORAL at 08:35

## 2018-09-05 RX ADMIN — ACETAMINOPHEN 650 MG: 325 TABLET, FILM COATED ORAL at 17:20

## 2018-09-05 RX ADMIN — ACETAMINOPHEN 650 MG: 325 TABLET, FILM COATED ORAL at 05:12

## 2018-09-05 RX ADMIN — SODIUM CHLORIDE, PRESERVATIVE FREE: 5 INJECTION INTRAVENOUS at 01:17

## 2018-09-05 ASSESSMENT — ACTIVITIES OF DAILY LIVING (ADL)
ADLS_ACUITY_SCORE: 8

## 2018-09-05 ASSESSMENT — VISUAL ACUITY
OU: NORMAL ACUITY
OU: NORMAL ACUITY;GLASSES
OU: NORMAL ACUITY
OU: NORMAL ACUITY;GLASSES
OU: NORMAL ACUITY;GLASSES
OU: NORMAL ACUITY
OU: NORMAL ACUITY;GLASSES

## 2018-09-05 NOTE — PROGRESS NOTES
[facility]    Patient Name:  Vahe Haas  MRN:  [id number]    :  1975    Date of Admission:  9/3/2018  Date of Service:  2018    Subjective:  Vahe Haas is a 43 year old gentleman who came to the ED after experiencing the worst headache of his life, nausea and vomiting. CT head was unrevealing but CTA head/neck showed a right vertebral artery occlusion with possible underlying dissection. MRI of the brain showed multiple areas of restricted diffusion including lesions in both the right and left cerebellum and in the left posterior temporal-occipital region. MRA of the neck with fat sat sequences did not show a definite dissection.     CT head this morning showed normal evolution of the strokes with the expected associated edema. No evidence of obstructive hydrocephalus.     TTE today shows mild concentric ventricular hypertrophy. EF 65-70%. No PFO or intracardiac masses.       24 Hour Vital Signs Summary:  Temperatures:  Current - Temp: 98  F (36.7  C); Max - Temp  Av.1  F (36.7  C)  Min: 97.7  F (36.5  C)  Max: 98.3  F (36.8  C)  Respiration range: Resp  Av.1  Min: 16  Max: 20  Pulse range: No Data Recorded  Blood pressure range: Systolic (24hrs), Av , Min:127 , Max:137   ; Diastolic (24hrs), Av, Min:74, Max:88    Pulse oximetry range: SpO2  Av.1 %  Min: 95 %  Max: 98 %    Physical Examination:  General:  Patient lying supine in bed   HEENT:  Normocephalic/atraumatic  Cardio:  Regular rate and rhythm   Pulmonary:  No respiratory distress  Extremities:  No edema  Skin: Dry     Neurologic Examination:  Mental Status: Alert and oriented.   Language: Speaks in full sentences. Able to name and repeat. Follows commands.   Speech: Possible mild dysarthria.   Cranial Nerves: Gaze is conjugate. Extraocular movements are intact. Face is symmetric.  Motor: Moving all limbs well, symmetrically.     Assessment/Plan:  #1 Acute ischemic strokes in the right and  left cerebellum and left posterior temporal-occipital region  #2 Right vertebral artery dissection and occlusion     Clinically, this patient's presentation is most consistent with a vertebral artery dissection leading to vessel occlusion and multiple posterior circulation strokes. The concern right now is the edema that is expected to develop in the areas of the stroke. We should watch his mental status closely and repeat a CT head if necessary. The concern is obstructive hydrocephalus secondary to cerebellar edema. Right now it looks like he has additional room to swell. We will plan for a routine repeat CT head tomorrow morning and if stable, work on discharging the patient.     RECOMMENDATIONS:  - Continue aspirin 325 mg daily  - Continue working with PT/OT  - Routine CT head tomorrow morning     Thank you for the interesting consult. Please contact our service with any questions or concerns.

## 2018-09-05 NOTE — PLAN OF CARE
Problem: Patient Care Overview  Goal: Plan of Care/Patient Progress Review  Discharge Planner SLP   Patient plan for discharge: Home with OP speech.  Current status: Speech/language evaluation completed. Patient presents with mild dysarthria noted in conversation characterized by imprecise articulation. Mild verbal language deficits for complex tasks. Reading comprehension was mildly impaired at the paragraph level. Vision impacting tracking and word substitutions noted. Recommend: 1. Daily speech therapy for dysarthria and language deficits. Will address cognitive skills during treatment.   Barriers to return to prior living situation: None   Recommendations for discharge: OP ST at discharge.   Rationale for recommendations: Patient would benefit from continued speech therapy for return to vocation and independent living skills.        Entered by: Deb Ramirez 09/05/2018 2:49 PM

## 2018-09-05 NOTE — PLAN OF CARE
Problem: Patient Care Overview  Goal: Plan of Care/Patient Progress Review  Outcome: Improving  A&O x4 but can be forgetful. Neuros include right field cut, RUE ataxia, and WFD. VSS. Tele NSR. Tolerating regular diet well. Up with SBA. Tylenol for head pain. Continue to monitor and follow POC.

## 2018-09-05 NOTE — PROGRESS NOTES
09/05/18 1428   General Information, SLP   Type of Evaluation Speech and Language   Type of Visit Initial   Start of Care Date 09/05/18   Onset of Illness/Injury or Date of Surgery - Date 09/03/18   Referring Physician Dr. Godoy   Patient/Family Goals Statement Patient did not state.    Pertinent History of Current Problem Vahe Haas is a 43 year old gentleman who came to the ED after experiencing the worst headache of his life, nausea and vomiting. CT head was unrevealing but CTA head/neck showed a right vertebral artery occlusion with possible underlying dissection. MRI of the brain showed multiple areas of restricted diffusion including lesions in both the right and left cerebellum and in the left posterior temporal-occipital region. MRA of the neck with fat sat sequences did not show a definite dissection.    Precautions/Limitations no known precautions/limitations   Oral Motor Sensory Function   Completed on Swallow Evaluation Completed Clinical Bedside Swallow Evaluation   Speech   Deficits in Articulation Other (Comment)  (Minimal to mild imprecision with articulation.)   Language: Auditory Comprehension (understanding of spoken language)   Tests were administered at the following levels Complex (vocation/community/social activities)   Paragraph; Discourse Comprehension Test (out of 8 total; less than 7 is below mean) 7   Functional Assessment Scale (Auditory Comprehension) No Impairment   Language: Verbal Expression (use of spoken language to express information)   Tests were administered at the following levels Complex (vocation/community/social activities)   Define Words; [place] Test for Differential Diagnosis Of Aphasia (out of 10 total) 9   Generative Naming Score; Cognitive Linguistic Quick Test 28   Generative Naming; Cognitive Linguistic Quick Test Result Below mean  (slightly below. )   Functional Assessment Scale (Verbal Expression) Minimal Impairment   Comments (Verbal Expression)  Minimal word finding noted in conversation. Patient reports needing to think about what he wants to say first.    Reading Comprehension (understanding of written language)   Tests were administered at the following levels Complex (vocation/community/social activities)   Sentences and Paragraphs; [place] Diagnostic Aphasia Exam (out of 10 total) 9   Functional Assessment Scale (Reading Comprehension) Mild Impairment   Comments (Reading Comprehension) Visual disturbance impacting reading comprehension.    General Therapy Interventions   Planned Therapy Interventions Language   Language Verbal expression;Reading comprehension   Clinical Impression, SLP Eval   Criteria for Skilled Therapeutic Interventions Met Yes   SLP Diagnosis Mild dysarthria and language deficits   Rehab Potential Good, to achieve stated therapy goals   Therapy Frequency Daily   Predicted Duration of Therapy Intervention (days/wks) 1 week   Anticipated Discharge Disposition Home with Outpatient Therapy   Risks and Benefits of Treatment have been explained. Yes   Patient, Family & other staff in agreement with plan of care Yes   Clinical Impression Comments Patient presents with mild dysarthria noted in conversation characterized by imprecise articulation. Mild verbal language deficits for complex tasks. Reading comprehension was mildly impaired at the paragraph level. Vision impacting tracking and word substitutions noted.    Total Evaluation Time      Total Evaluation Time (Minutes) 20

## 2018-09-05 NOTE — PROVIDER NOTIFICATION
"Deb SEGOVIA paged, \"Pt requesting home medication Zyrtec for allergies- runny nose this AM. Please advise, thank you!\"  "

## 2018-09-06 ENCOUNTER — APPOINTMENT (OUTPATIENT)
Dept: CT IMAGING | Facility: CLINIC | Age: 43
DRG: 299 | End: 2018-09-06
Payer: COMMERCIAL

## 2018-09-06 VITALS
SYSTOLIC BLOOD PRESSURE: 124 MMHG | DIASTOLIC BLOOD PRESSURE: 70 MMHG | OXYGEN SATURATION: 96 % | TEMPERATURE: 97.9 F | RESPIRATION RATE: 16 BRPM | HEART RATE: 76 BPM

## 2018-09-06 PROCEDURE — 99239 HOSP IP/OBS DSCHRG MGMT >30: CPT | Performed by: PHYSICIAN ASSISTANT

## 2018-09-06 PROCEDURE — 25000132 ZZH RX MED GY IP 250 OP 250 PS 637: Performed by: HOSPITALIST

## 2018-09-06 PROCEDURE — 25000132 ZZH RX MED GY IP 250 OP 250 PS 637: Performed by: PHYSICIAN ASSISTANT

## 2018-09-06 PROCEDURE — 99232 SBSQ HOSP IP/OBS MODERATE 35: CPT | Performed by: PSYCHIATRY & NEUROLOGY

## 2018-09-06 PROCEDURE — 70450 CT HEAD/BRAIN W/O DYE: CPT

## 2018-09-06 RX ORDER — BISACODYL 10 MG
10 SUPPOSITORY, RECTAL RECTAL DAILY PRN
Qty: 5 SUPPOSITORY | Refills: 1 | Status: SHIPPED | OUTPATIENT
Start: 2018-09-06 | End: 2020-08-25

## 2018-09-06 RX ORDER — AMOXICILLIN 250 MG
1-2 CAPSULE ORAL 2 TIMES DAILY PRN
Qty: 100 TABLET | Refills: 0 | Status: SHIPPED | OUTPATIENT
Start: 2018-09-06 | End: 2020-08-25

## 2018-09-06 RX ORDER — ACETAMINOPHEN 325 MG/1
650 TABLET ORAL EVERY 4 HOURS PRN
Qty: 100 TABLET | Refills: 0 | Status: SHIPPED | OUTPATIENT
Start: 2018-09-06 | End: 2020-08-25

## 2018-09-06 RX ORDER — ASPIRIN 325 MG
325 TABLET, DELAYED RELEASE (ENTERIC COATED) ORAL DAILY
Qty: 60 TABLET | Refills: 0 | Status: SHIPPED | OUTPATIENT
Start: 2018-09-07 | End: 2020-03-18

## 2018-09-06 RX ADMIN — SERTRALINE HYDROCHLORIDE 100 MG: 100 TABLET ORAL at 09:29

## 2018-09-06 RX ADMIN — ACETAMINOPHEN 650 MG: 325 TABLET, FILM COATED ORAL at 14:23

## 2018-09-06 RX ADMIN — ASPIRIN 325 MG: 325 TABLET, DELAYED RELEASE ORAL at 09:28

## 2018-09-06 RX ADMIN — ACETAMINOPHEN 650 MG: 325 TABLET, FILM COATED ORAL at 09:28

## 2018-09-06 RX ADMIN — CETIRIZINE HYDROCHLORIDE 5 MG: 5 TABLET ORAL at 09:29

## 2018-09-06 RX ADMIN — BUPROPION HYDROCHLORIDE 150 MG: 150 TABLET, FILM COATED, EXTENDED RELEASE ORAL at 09:29

## 2018-09-06 RX ADMIN — BISACODYL 10 MG: 10 SUPPOSITORY RECTAL at 12:07

## 2018-09-06 ASSESSMENT — ACTIVITIES OF DAILY LIVING (ADL)
ADLS_ACUITY_SCORE: 8

## 2018-09-06 ASSESSMENT — VISUAL ACUITY
OU: NORMAL ACUITY
OU: NORMAL ACUITY
OU: NORMAL ACUITY;GLASSES

## 2018-09-06 NOTE — PLAN OF CARE
Problem: Patient Care Overview  Goal: Plan of Care/Patient Progress Review  Outcome: No Change  A&Ox4. Neuros intact, except R field cut, RUE ataxia, WFD. VSS. Tele NSR. Regular diet. Up with A1/GB. Pain decreased with Tylenol. Possible discharge home today with OP SLP/OT.

## 2018-09-06 NOTE — CONSULTS
Care Transition Initial Assessment - RN  Reason For Consult: care coordination/care conference, discharge planning   Met with: Patient, wife, and parents.  DATA   Active Problems:    Vertebral artery dissection (H)  Cognitive Status: awake, alert and oriented.  Primary Care Clinic Name: [facility]  Primary Care MD Name: Dr Carlin  Contact information and PCP information verified: Yes  Lives With: spouse  Living Arrangements: house   Who is your support system?: Wife     Insurance concerns: No Insurance issues identified  ASSESSMENT  Patient currently receives the following services:  none   Identified issues/concerns regarding health management:  Pt has a verys supportive wife who can assist pt 24/7 the next 10 days.  Pt/Wife have a good understanding of DC plan and F/U appts and cares.  Pt have no other questions or concerns re. DC.   PLAN  Financial costs for the patient include TBD .  Patient given options and choices for discharge yes .  Patient/family is agreeable to the plan?  Yes:  outpt OT/SLP  Patient anticipates discharging to home .  Patient anticipates needs for home equipment: Yes  Plan/Disposition: Home   Appointments:  PCP   On 9/14, CT-Angio 12/6.  I called Dr Forrest's clinic and they will have to call pt or wife as Dr Forrest's schedule in December is not available.  The following was entered in AVS:     You have CT-Angio scheduled on December 6 at 11:30.  Please check-in 30 minutes early and do not eat or drink at least 2 hours prior to procedure. Please see further instruction and scheduled appointment sections for more information regarding your CT-angio.    Neurology follow up in 12 weeks.  Dr Forrest's  will call you/wife to schedule your appointment.  If you do not hear from them by noon tomorrow please call clinic at [phone].    Care  (CTS) will continue to follow as needed.  Glenna Vieira, RN, BSN  Care Coordinator, [facility]  [facility]  [phone]

## 2018-09-06 NOTE — PLAN OF CARE
Problem: Patient Care Overview  Goal: Plan of Care/Patient Progress Review  Outcome: Improving  A/O. VSS. CMS and Neuro intact except RUE ataxia, WFD, slight weakness on Rt. C/o HA, improved with HOB flat and tylenol, potential blood patch tomorrow pending status. Tolerating regular diet. Vdg. No BM since PTA, passing flatus. Up with SBA. Plan for discharge tomorrow to Home with OP SLP and OT pending progress.

## 2018-09-06 NOTE — PROGRESS NOTES
[facility]  Neurology Progress Note         Interval History:   Mr. 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     He did well overnight. He and his wife report that he had a restful night of sleep and that his post-LP headaches were getting better after drinking a lot of coffee and espresso yesterday. This morning after returning from CT scan, Mr. Haas started having new left-sided frontal headache ranked 5/10 when it started. At around 10AM today, he ranked it a 1/10. He also reported feeling very cold after coming back from the CT scan and needed several blankets to keep warm. His wife reports that this may be because he has an ice pack under his head and has been drinking ice water. He and his wife were also concerned about the headache when bearing down to pass stool which is localized to the front of his head. Goes away immediately when he stops bearing down.             Review of Systems:   Negative for dizziness (room spinning), nausea or double vision.          Assessment and Plan:     Impression:    1. Rt Vertebral artery dissection with left PCA and right PICA infarcts, additional scattered post circ stroke. Cerebellar edema appears to have stabilized since there is little change in his head CT from 9/5/18 to 9/6/18. Fourth ventricle remains patent and edema probably reached its peak. Right upper visual field cut appears to have improved on physical exam and as reported by patient. Still some ataxia and slowness of upper right limb.     2. Post-LP Headache. Improved since yesterday but new left-sided frontal headache following head CT today morning. Originally 5/10 but came down to 1/10. Also has frontal headache when bearing-down to go to the bathroom, preventing patient from passing stool.  Was given laxatives yesterday but still has yet to pass bowel movement.    Recommendations:  - Continue with Aspirin 325mg daily.  - Patients cerebellar edema remained stable. Probably reached its peak. No regular head scan required. He is clinically improved in terms of stroke symptoms and ambulating in the room.  - Patient educated regarding stroke symptoms and when to call 911.   - He will be supervised by his wife 24-48hr at home if he leaves today   - Continue hydration and Caffeine for headaches  - If patient headaches continue to improve then blood patch not required.  - Continue stool softeners/laxatives  - Repeat CTA head/neck in 3 months  - No driving until evaluated by OT as outpatient    -He will follow-up with his PCP in a week and then with Neurology in 12 weeks.  - Advised to call 911 if headaches worsen or increase in frequency, experiences dizziness, somnolence, or weakness or if there are any changes in consciousness or vision.        Attestation:  I attest seeing and evaluating this patient with the medical student. I agree with the impression and plan. I updated this note and did the appropriate changes.  Daniel Forrest MD  Stroke Neurology          Medications:     Current Facility-Administered Medications Ordered in Epic   Medication Dose Route Frequency Last Rate Last Dose     acetaminophen (TYLENOL) tablet 650 mg  650 mg Oral Q4H PRN   650 mg at 09/06/18 0928     aspirin EC tablet 325 mg  325 mg Oral Daily   325 mg at 09/06/18 0928    Or     aspirin Suppository 300 mg  300 mg Rectal Daily         atorvastatin (LIPITOR) tablet 40 mg  40 mg Oral or NG Tube Daily at 8 pm   40 mg at 09/05/18 1949     bisacodyl (DULCOLAX) Suppository 10 mg  10 mg Rectal Daily PRN         buPROPion (WELLBUTRIN XL) 24 hr tablet 150 mg  150 mg Oral Daily   150 mg at 09/06/18 0929     cetirizine (zyrTEC) tablet 5 mg  5 mg Oral Daily   5 mg at 09/06/18 0929     HYDROcodone-acetaminophen (NORCO) 5-325 MG per tablet  1-2 tablet  1-2 tablet Oral Q4H PRN         HYDROmorphone (PF) (DILAUDID) injection 0.3-0.5 mg  0.3-0.5 mg Intravenous Q2H PRN         labetalol (NORMODYNE/TRANDATE) injection 10-40 mg  10-40 mg Intravenous Q10 Min PRN         magnesium hydroxide (MILK OF MAGNESIA) suspension 30 mL  30 mL Oral Daily PRN         Medication Instruction   Does not apply Continuous PRN         melatonin tablet 1 mg  1 mg Oral At Bedtime PRN         naloxone (NARCAN) injection 0.1-0.4 mg  0.1-0.4 mg Intravenous Q2 Min PRN         ondansetron (ZOFRAN-ODT) ODT tab 4 mg  4 mg Oral Q6H PRN        Or     ondansetron (ZOFRAN) injection 4 mg  4 mg Intravenous Q6H PRN         prochlorperazine (COMPAZINE) injection 10 mg  10 mg Intravenous Q6H PRN        Or     prochlorperazine (COMPAZINE) tablet 10 mg  10 mg Oral Q6H PRN        Or     prochlorperazine (COMPAZINE) Suppository 25 mg  25 mg Rectal Q12H PRN         senna-docusate (SENOKOT-S;PERICOLACE) 8.6-50 MG per tablet 1 tablet  1 tablet Oral BID PRN        Or     senna-docusate (SENOKOT-S;PERICOLACE) 8.6-50 MG per tablet 2 tablet  2 tablet Oral BID PRN   2 tablet at 09/05/18 2127     sertraline (ZOLOFT) tablet 100 mg  100 mg Oral Daily   100 mg at 09/06/18 0929     sodium chloride (PF) 0.9% PF flush 10 mL  10 mL Intravenous Once         No current [facility]-ordered outpatient prescriptions on file.             Neurology Focused Physical Exam:   The following assessments were done and were normal unless otherwise specified:  General Comments:   Mr. Haas laying down and covered in blankets.      Mental Status:   Level of consciousness - normal  Orientation - normal  Language - normal  Memory - normal  Attention / concentration - normal  Fund of knowledge - normal   Cranial Nerves:   Cranial nerves II through XII are grossly intact  II: There is right upper field cut.     Motor:   Arm drift - none  Speed and dexterity - abnormal: Right hand slower movement than left hand which is normal.   Sensory  perception:   Light touch - normal     Cerebellar Coordination:   Finger to nose - abnormal: Right side ataxia. Left side normal.  Heel to shin - normal   [employer] Stroke Scale     Score    Level of consciousness: (0)   Alert, keenly responsive    LOC questions: (0)   Answers both questions correctly    LOC commands: (0)   Performs both tasks correctly    Best gaze: (0)   Normal    Visual: (1)   Partial hemianopia    Facial palsy: (0)   Normal symmetrical movements    Motor arm (left): (0)   No drift    Motor arm (right): (1)   Drift    Motor leg (left): (0)   No drift    Motor leg (right): (0)   No drift    Limb ataxia: (1)   Present in one limb    Sensory: (0)   Normal- no sensory loss    Best language: (0)   Normal- no aphasia    Dysarthria: (0)   Normal    Extinction and inattention: (0)   No abnormality        Total Score:  2

## 2018-09-06 NOTE — PLAN OF CARE
Problem: Patient Care Overview  Goal: Plan of Care/Patient Progress Review  Outcome: Adequate for Discharge Date Met: 09/06/18  Speech Language Therapy Discharge Summary    Reason for therapy discharge:    Discharged to home with outpatient therapy.    Progress towards therapy goal(s). See goals on Care Plan in [facility] electronic health record for goal details.  Goals not met.  Barriers to achieving goals:   discharge from facility.    Therapy recommendation(s):    Continued therapy is recommended.  Rationale/Recommendations:  Continue with OP speech therapy for speech and language deficits and assess cognitive skills. .

## 2018-09-06 NOTE — DISCHARGE INSTRUCTIONS
CTA HEAD NECK W - Please bring any scans or X-rays taken at other hospitals, if similar tests were done. Also bring a list of your medicines, including vitamins, minerals and over-the-counter drugs. It is safest to leave personal items at home.    Be sure to tell your doctor:    If you have any allergies.    If there s any chance you are pregnant.    If you are breastfeeding.     If you have diabetes as your medication may need to be adjusted for this exam.    You will have contrast for this exam. To prepare:    Do not eat or drink for 2 hours before your exam. If you need to take medicine, you may take it with small sips of water. (We may ask you to take liquid medicine as well.)    The day before your exam, drink extra fluids--at least six 8-ounce glasses (unless your doctor tells you to restrict your fluids).    Patients over 70 or patients with diabetes or kidney problems:    If you haven t had a blood test (creatinine test) within the last 30 days, the Cardiologist/Radiologist may require you to get this test prior to your exam.    Please wear loose clothing, such as a sweat suit or jogging clothes. Avoid snaps, zippers and other metal. We may ask you to undress and put on a hospital gown.    If you have any questions, please call [phone].

## 2018-09-06 NOTE — PLAN OF CARE
Problem: Patient Care Overview  Goal: Plan of Care/Patient Progress Review  Outcome: Improving  A&O x4 but can be forgetful. Neuros include right field cut, RUE ataxia which is improving, and WFD. VSS. Tele NSR. Tolerating regular diet well. Up with SBA. Tylenol for head pain. Continue to monitor and follow POC.

## 2018-09-06 NOTE — DISCHARGE SUMMARY
[facility]    Discharge Summary  Hospitalist    Date of Admission:  9/3/2018  Date of Discharge:  9/6/2018  Discharging Provider: Deb Christiansen PA-C    Discharge Diagnoses   Right vertebral artery dissection/occlusion  Acute CVA- Lt PCA and RT PICA  Elevated blood pressure  Lumbar puncture headache  Mild left ventricular hypertrophy on ECHO    Chronic stable diagnoses:  Anxiety   Depression  OA    History of Present Illness   Mr. Vahe Haas is a 43-year-old male with anxiety, depression and allergic rhinitis, JANA who was brought to ED with complaints of headache, nausea and intermittent confusion and found to have with a right vertebral artery dissection with acute CVAs, admitted on 9/3/18 for further management. For full HPI please see admission H&P from Dr. Godoy dated 9/3/18.    Hospital Course   Vahe Haas was admitted on 9/3/2018.  The following problems were addressed during his hospitalization:    Day of discharge, VSS, and LP HA improving. Seen by neurology this AM. Plan to follow up with PCP in 1 week and neurology clinic in 12 weeks. Stable and ready for discharge.    Right vertebral artery dissection/occlusion, additional scattered post circ stroke  Acute CVAs: Rt/Lt cerebellum and Lt posterior temporal-occipital region (Lt PCA and Rt PICA infarcts)  Presented with HA, nausea, and intermittent confusion.   Brain MRI 9/3/18: without bleed or mass effect, multiple areas of restricted diffusion including lesion in both Rt/Lt cerebellum and in Lt posterior temporal-occipital region c/w acute infarcts.   MRA neck 9/3/18: mild-mod focal stenosis in V3 segment of distal Rt vertebral artery. Blood flow improved to Rt vertebral artery from prior CTA. No stenosis at carotid bifurcations.   Most recent Repeat Head CT 9/6/18: Evolving acute to subacute ischemic infarcts in the cerebellar hemispheres and Lt occipital lobe with some associated mass effect. No evidence of hemorrhage  transformation or hydrocephalus.  ECHO 9/5/18: Mild concentric LVH, LV systolic fxn wnl, intact atrial septum, neg bubble study, EF 65-70%, no valve dysfunction noted    HbA1c 5.6%. Echo initially cancelled by neurology since pt with (+) MRA but now reordered. BMP unremarkable. Lipid panel not fasting 9/4/18: , HDL 37, , TG 99. Neuro telemetry - NSR during admission. Seen by SLP and cleared for regular diet with thin liquids and GERD precautions. PT signed off, no outpatient needs. OT recommending home with assist from spouse/parents and outpatient OT and SLP for visual assessment/intervention, dysarthria, and language deficits and further workup. Followed by neurology during admission. CSF culture and gram stain negative. Discharging on  mg daily. No need for statin at this time given lipid panel adequate. Seen by neurology this AM. Plan for home with family supervision and outpatient OT/SLP as above. Repeat CTA head/neck in 3 months. No driving until evaluated by OT as outpatient. Advised to call 911 if headaches worsen or increase in frequency, experiences dizziness, somnolence, or weakness or if there are any changes in consciousness or vision.     LP headache improving  C/o HA since LP, some improvements. Received IV fluids for ~1 day to help HA, continue tylenol, caffeine, and comfortable positioning, probably not a benefit to blood patch x3 days post LP. Pt to continue caffeine, hydration, comfort positioning, and tylenol at home on discharge.      Elevated blood pressure, improving    No hx of HTN. No prior antihypertensives. SBPs 130s. HR 70s. Allowed permissive hypertension, d/w Neuro fellow, may be elevated for days up to 2 weeks post CVA. Patient to follow up with PCP as outpatient for BP checks and to eval for need for antihypertensives, pt/family aware.      Anxiety/depression, Resumed on PTA Zoloft and Wellbutrin.       Obstructive sleep apnea, Continue with nocturnal CPAP.     #  Discharge Pain Plan:   - During his hospitalization, Vahe experienced pain due to post LP headache.  The pain plan for discharge was discussed with Vahe and the plan was created in a collaborative fashion.    - as above    This patient was discussed with Dr. Glover of the Hospitalist Service who agrees with current plans as outlined above.    Deb Christiansen PA-C  Hospitalist Physician Assistant  Pager: [phone]      Significant Results and Procedures   Head CTs  CTA head and neck  Brain MRI  Neck MRA  ECHO    Pending Results   These results will be followed up by Neurology Clinic/PCP  Unresulted Labs Ordered in the Past 30 Days of this Admission     Date and Time Order Name Status Description    9/3/2018 1024 CSF Culture Aerobic Bacterial Preliminary           Code Status   Full Code       Primary Care Physician   Neida Benz    Physical Exam   Temp: 97.9  F (36.6  C) Temp src: Oral BP: 135/80   Heart Rate: 84 Resp: 16 SpO2: 95 % O2 Device: None (Room air)    There were no vitals filed for this visit.  Vital Signs with Ranges  Temp:  [97.7  F (36.5  C)-98.7  F (37.1  C)] 97.9  F (36.6  C)  Heart Rate:  [73-94] 84  Resp:  [16] 16  BP: (115-150)/(63-87) 135/80  SpO2:  [93 %-97 %] 95 %  I/O last 3 completed shifts:  In: 890 [P.O.:890]  Out: 675 [Urine:675]    Constitutional: Awake, alert, cooperative, no apparent distress.  HEENT: Moist mucous membranes. PERRL. EOMI.   Respiratory: Clear to auscultation bilaterally, no crackles or wheezing.  Cardiovascular: Regular rate and rhythm, normal S1 and S2, and no murmur noted.  GI: Soft, non-distended, non-tender, normal bowel sounds.  Skin: No rashes, no cyanosis, no edema.  Musculoskeletal: No joint swelling, erythema or tenderness.  Neurologic: Cranial nerves 2-12 grossly intact, normal strength and sensation. No further neglect noted. No drift. Fine motor intact. Speech fluent, duration improving. Neuro exam much improved.   Psychiatric: Alert, oriented  to person, place and time, no obvious anxiety or depression.    Discharge Disposition   Discharged to home  Condition at discharge: Stable    Consultations This Hospital Stay   NEUROLOGY IP CONSULT  PHYSICAL THERAPY ADULT IP CONSULT  OCCUPATIONAL THERAPY ADULT IP CONSULT  SPEECH LANGUAGE PATH ADULT IP CONSULT  SWALLOW EVAL SPEECH PATH AT BEDSIDE IP CONSULT  CARE COORDINATOR IP CONSULT    Time Spent on this Encounter   I, Deb Christiansen, personally saw the patient today and spent greater than 30 minutes discharging this patient.    Discharge Orders     CTA Head Neck with Contrast     Occupational Therapy Referral     Speech Therapy Referral     Reason for your hospital stay   You were admitted with headache, nausea, confusion, and found to have strokes.     Follow-up and recommended labs and tests    Follow up with primary care provider, Neida Benz, within 7 days to evaluate medication change, to evaluate treatment change, for hospital follow- up and regarding new diagnosis.  The following labs/tests are recommended: monitor blood pressure and need for antihypertensives. Mild LVH on ECHO 9/5/18.    Follow up as instructed in neuro clinic in 3 months. Also have repeat CTA head/neck in 3 months to be arranged by neurology.     Activity   Your activity upon discharge: activity as tolerated and no driving until you see outpatient occupational therapy     Discharge Instructions   - Continue with Aspirin 325mg daily.  - Patient educated regarding stroke symptoms and when to call 911.   - He will be supervised by his wife 24-48hr at home if he leaves today   - Continue hydration and Caffeine for headaches  - Continue stool softeners/laxatives  - Repeat CTA head/neck in 3 months  - No driving until evaluated by OT as outpatient   - Call 911 if headaches worsen or increase in frequency, experiences dizziness, somnolence, or weakness or if there are any changes in consciousness or vision.     Full Code     Diet    Follow this diet upon discharge: Orders Placed This Encounter     Regular Diet Adult Thin Liquids (water, ice chips, juice, milk, gelatin, ice cream, etc) (straws ok)       Discharge Medications   Current Discharge Medication List      START taking these medications    Details   acetaminophen (TYLENOL) 325 MG tablet Take 2 tablets (650 mg) by mouth every 4 hours as needed for mild pain  Qty: 100 tablet, Refills: 0    Comments: Future refills by PCP Dr. Neida Benz with phone number [phone].  Associated Diagnoses: Other headache syndrome      aspirin 325 MG EC tablet Take 1 tablet (325 mg) by mouth daily  Qty: 60 tablet, Refills: 0    Comments: Future refills by PCP Dr. Neida Benz with phone number [phone].  Associated Diagnoses: Cerebrovascular accident (CVA) due to other mechanism (H)      bisacodyl (DULCOLAX) 10 MG Suppository Place 1 suppository (10 mg) rectally daily as needed for constipation  Qty: 5 suppository, Refills: 1    Comments: Future refills by PCP Dr. Neida Benz with phone number [phone].  Associated Diagnoses: Constipation, unspecified constipation type      senna-docusate (SENOKOT-S;PERICOLACE) 8.6-50 MG per tablet Take 1-2 tablets by mouth 2 times daily as needed for constipation  Qty: 100 tablet, Refills: 0    Comments: Future refills by PCP Dr. Neida Benz with phone number [phone].  Associated Diagnoses: Constipation, unspecified constipation type         CONTINUE these medications which have NOT CHANGED    Details   buPROPion (WELLBUTRIN XL) 150 MG 24 hr tablet TAKE 1 TABLET (150 MG) BY MOUTH EVERY MORNING - NEEDS APPOINTMENT  Qty: 90 tablet, Refills: 1    Associated Diagnoses: Major depressive disorder, recurrent episode, moderate (H)      sertraline (ZOLOFT) 100 MG tablet Take 1 tablet (100 mg) by mouth daily  Qty: 90 tablet, Refills: 1    Associated Diagnoses: Major depressive disorder, recurrent episode, moderate (H)       ALLERGY SHOTS       order for DME AIRSENSE 10  7-12 CM/H20  NASAL WISP           Allergies   Allergies   Allergen Reactions     Seasonal Allergies      Data   Most Recent 3 CBC's:  Recent Labs   Lab Test  09/03/18   0934   WBC  7.5   HGB  15.4   MCV  85   PLT  156      Most Recent 3 BMP's:  Recent Labs   Lab Test  09/04/18   1040  09/03/18   0934  02/17/16   1202   NA  142  143  142   POTASSIUM  4.0  3.5  3.9   CHLORIDE  109  109  108   CO2  24  25  23   BUN  16  16  18   CR  1.01  1.04  1.02   ANIONGAP  9  9  11   KHADIJAH  8.6  8.0*  8.9   GLC  98  158*  97     Most Recent 2 LFT's:  Recent Labs   Lab Test  09/03/18   0934   AST  22   ALT  29   ALKPHOS  81   BILITOTAL  0.3     Most Recent INR's and Anticoagulation Dosing History:  Anticoagulation Dose History     There is no flowsheet data to display.        Most Recent 3 Troponin's:  Recent Labs   Lab Test  09/04/18   0100   TROPI  <0.015     Most Recent Cholesterol Panel:  Recent Labs   Lab Test  09/04/18   1040   CHOL  166   LDL  109*   HDL  37*   TRIG  99     Most Recent 6 Bacteria Isolates From Any Culture (See EPIC Reports for Culture Details):  Recent Labs   Lab Test  09/03/18   1110   CULT  Culture negative monitoring continues     Most Recent TSH, T4 and A1c Labs:  Recent Labs   Lab Test  09/03/18   0934  12/23/10   1442   TSH   --   1.19   A1C  5.6   --      Results for orders placed or performed during the hospital encounter of 09/03/18   CT Head w/o Contrast    Narrative    CT SCAN OF THE HEAD WITHOUT CONTRAST   9/3/2018 9:52 AM     HISTORY: Severe headache.     TECHNIQUE: Axial images of the head and coronal reformations without  IV contrast material. Radiation dose for this scan was reduced using  automated exposure control, adjustment of the mA and/or kV according  to patient size, or iterative reconstruction technique.    COMPARISON: None.    FINDINGS: The cerebral hemispheres, brainstem, and cerebellum  demonstrate normal morphology and attenuation.  No evidence of  ischemia, hemorrhage, mass, mass effect, or hydrocephalus. The  visualized calvarium, skull base, paranasal sinuses, and extracranial  soft tissues are unremarkable.      Impression    IMPRESSION: No acute intracranial abnormality.    GEORGETTE SCOTT MD   CTA Head Neck with Contrast     Value    Radiologist flags Vascular occlusion (AA)    Narrative    CT ANGIOGRAM OF THE HEAD AND NECK WITH CONTRAST  9/3/2018 1:27 PM     HISTORY: Headache, new onset, evaluate dissection, aneurysm, etc.      TECHNIQUE: CT angiography with an injection of 70 mL [id number]-370 IV  with scans through the head and neck. Images were transferred to a  separate 3-D workstation where multiplanar reformations and 3-D images  were created. Estimates of carotid stenoses are made relative to the  distal internal carotid artery diameters except as noted. Radiation  dose for this scan was reduced using automated exposure control,  adjustment of the mA and/or kV according to patient size, or iterative  reconstruction technique.    COMPARISON: Same day head CT     CT HEAD FINDINGS: No contrast enhancing lesions are identified. Focal  groundglass 1 cm lesion within the left frontal bone likely represents  fibro-osseous lesion or fibrous dysplasia, unchanged.    CT ANGIOGRAM HEAD FINDINGS: The intracranial left vertebral artery,  basilar artery, and posterior cerebral arteries are patent. The  bilateral intracranial internal carotid arteries, middle cerebral  arteries, and anterior cerebral arteries are patent. No aneurysms are  identified. Complete [place] of  is present. Dural venous sinuses  are unremarkable. Right transverse sinus is dominant with diminutive  left transverse sinus.    CT ANGIOGRAM NECK FINDINGS: A 2 vessel aortic arch is present. The  bilateral common carotid, internal carotid, external carotid, and left  vertebral arteries are patent. The right vertebral artery is occluded.  The origin is not well-visualized,  however there is asymmetrically  decreased small-volume contrast opacification throughout the mid V2  segment with ultimate reocclusion at the V2-3 junction through the V3  segment. There is trace filling of the V4 segment. Likely combination  right-sided AICA/PICA artery is present, however there is  asymmetrically decreased contrast opacification of the right posterior  inferior cerebellar artery branches.    There are mild degenerative changes of the cervical spine with  degenerative disc disease most severe at C5-6.       Impression    IMPRESSION: Right vertebral artery dissection/occlusion.    [Critical Result: Vascular occlusion]    Finding was identified on 9/3/2018 1:31 PM.     Dr. Rosen was contacted by me on 9/3/2018 1:38 PM and verbalized  understanding of the critical result.     GEORGETTE SCOTT MD   MRI Brain w & w/o contrast    Narrative    MRI BRAIN WITHOUT AND WITH CONTRAST  9/3/2018 4:47 PM    HISTORY:  Stroke with SWI Sequence.       TECHNIQUE:  Multiplanar, multisequence MRI of the brain without and  with 11mL Gadavist.     COMPARISON: CT studies dated 9/3/2018.     FINDINGS:  Diffusion weighted images reveal multiple areas of  restricted diffusion in the right cerebellum. Areas of restricted  diffusion are seen in the inferior, mid and superior right cerebellum.  There is also an area of restricted diffusion in the left mid and left  superior cerebellum. There is an area of restricted diffusion in the  left posterior and medial temporal lobe and left occipital lobe. This  is in the distribution of the left posterior cerebral artery. All of  these areas of restricted diffusion are consistent with acute  infarcts. There is no hemorrhage or mass effect.  There are no  gadolinium enhancing lesions.   The facial structures appear normal.  The arteries at the base of the brain and the dural venous sinuses  appear patent.       Impression    IMPRESSION:   1. Multiple areas of restricted diffusion  including lesions in both  the right and left cerebellum and in the left posterior  temporal-occipital region. These areas of restricted diffusion are  consistent with acute infarcts.  2. No bleed or mass effect.    EVAN STEIN MD   MRA Neck (Carotids) wo & w Contrast    Narrative    MRA NECK (CAROTIDS) WO & W CONTRAST 9/3/2018 7:37 PM     HISTORY:  presumed dissection, T1 fat sat to confirm diagnosis.;     TECHNIQUE:  Sequential axial images of the neck were obtained using  2-dimensional time-of-flight before contrast and 3-dimensional  time-of-flight after the uneventful administration of 10mL Gadavist iv  contrast.    COMPARISON:  None.    FINDINGS: On the post gadolinium images, the posterior aspects of the  vertebral arteries are not included at the C1 level. Stenosis is  relative to the distal internal carotid diameter.    Right Carotid:  No significant stenosis is seen at the bifurcation  relative to the distal internal carotid diameter.    Left Carotid:  No significant stenosis is seen at the bifurcation  relative to the distal internal carotid diameter.    Vertebrals: Antegrade flow is seen in both vertebral arteries. Left  vertebral artery is dominant. There is a mild-moderate focal stenosis  in the V3 segment of the right distal vertebral artery. Blood flow in  the right vertebral artery has improved when compared to the CT  angiogram. I do not identify any definite intramural hematoma on the  axial T1 with fat-sat images.      Impression    IMPRESSION:   1. Mild-moderate focal stenosis in the V3 segment of the distal right  vertebral artery. Blood flow in the right vertebral artery has  improved when compared to the earlier CT angiogram. No focal  intramural hematoma is identified. No definite arterial dissection is  identified.  2. No stenosis at the carotid bifurcations.    EVAN STEIN MD   CT Head w/o Contrast    Narrative    CT SCAN OF THE HEAD WITHOUT CONTRAST   9/4/2018 8:59 AM     HISTORY:  Cerebellar stroke, evaluate edema.     TECHNIQUE:  Axial images of the head and coronal reformations without  IV contrast material. Radiation dose for this scan was reduced using  automated exposure control, adjustment of the mA and/or kV according  to patient size, or iterative reconstruction technique.    COMPARISON: MRI 9/3/2018, head CT 9/3/2018    FINDINGS:  Hypoattenuation and loss of gray-white differentiation  involving the left posterior thalamus, medial temporal lobe, and  occipital lobe are present corresponding to areas of diffusion  restriction on prior MRI. Patchy areas of hypoattenuation throughout  the bilateral cerebellum, right worse than left, are present  corresponding to areas of diffusion ejection on prior MRI. No evidence  of intracranial hemorrhage. The fourth ventricle remains patent. No  evidence of hydrocephalus. The remaining parenchyma is unremarkable.  No new areas of ischemia identified. The visualized calvarium, skull  base, paranasal sinuses, and extracranial soft tissues are  unremarkable.      Impression    IMPRESSION:  Expected evolution of bilateral posterior circulation  infarcts. No evidence of hemorrhage or hydrocephalus.    GEORGETTE SCOTT MD   CT Head w/o Contrast    Narrative    CT SCAN OF THE HEAD WITHOUT CONTRAST   9/5/2018 6:28 AM     HISTORY:  Follow up cerebellar edema.     TECHNIQUE:  Axial images of the head and coronal reformations without  IV contrast material. Radiation dose for this scan was reduced using  automated exposure control, adjustment of the mA and/or kV according  to patient size, or iterative reconstruction technique.    COMPARISON: Head CT 9/4/2018 and brain MR 9/3/2018.    FINDINGS: Compared to most recent head CT 9/4/2018, there is better  definition of the areas of infarct in the inferomedial left occipital  lobe, left hippocampus, as well as bilateral cerebellar hemispheres,  right greater than left. These areas of infarct or more  hypodense  compared to prior with slightly better defined margins. There is  persistent mass effect on the right aspect of the fourth ventricle.  Cerebellar edema may be slightly increased since prior as the right  cerebellar tonsil extends below the foramen magnum in a slightly  greater extent since prior.    No evidence of acute intracranial hemorrhage. While there is mass  effect on the fourth ventricle, the lateral and third ventricles do  not appear enlarged and are not significantly changed in size since  prior. No new abnormal extra-axial fluid collection.    The visualized portions of the sinuses and mastoids appear normal. The  bony calvarium and bones of the skull base appear intact.       Impression    IMPRESSION: Continued evolution of bilateral cerebellar infarcts and  left cerebral infarcts compared to recent head CT 9/4/2018. Cerebellar  edema appears to be slightly increased since prior with persistent  mass effect on the right aspect of the fourth ventricle. No evidence  of hydrocephalus. No evidence of hemorrhagic transformation.    Results discussed with nurse Ginny Baer at 7:52 AM on 9/5/2018.    TRENT MCINTYRE MD

## 2018-09-08 LAB
BACTERIA SPEC CULT: NO GROWTH
Lab: NORMAL
SPECIMEN SOURCE: NORMAL

## 2018-09-13 ENCOUNTER — HOSPITAL ENCOUNTER (OUTPATIENT)
Dept: OCCUPATIONAL THERAPY | Facility: CLINIC | Age: 43
Setting detail: THERAPIES SERIES
End: 2018-09-13
Attending: PHYSICIAN ASSISTANT
Payer: COMMERCIAL

## 2018-09-13 PROCEDURE — 97535 SELF CARE MNGMENT TRAINING: CPT | Mod: GO | Performed by: REHABILITATION PRACTITIONER

## 2018-09-13 PROCEDURE — 40000125 ZZHC STATISTIC OT OUTPT VISIT: Performed by: REHABILITATION PRACTITIONER

## 2018-09-13 PROCEDURE — 97166 OT EVAL MOD COMPLEX 45 MIN: CPT | Mod: GO | Performed by: REHABILITATION PRACTITIONER

## 2018-09-13 NOTE — PROGRESS NOTES
09/13/18 0900   Quick Adds   Type of Visit Initial Outpatient Occupational Therapy Evaluation       Present No   General Information   Start Of Care Date 09/13/18   Referring Physician Deb Christiansen PA-C   Orders Evaluate and treat as indicated   Other Orders SLP, PT   Orders Date 09/03/18   Medical Diagnosis Vertebral artery dissection with stroke   Onset of Illness/Injury or Date of Surgery 09/03/18   Special Instructions Cognitive assess s/p stroke   Surgical/Medical History Reviewed Yes   Additional Occupational Profile Info/Pertinent History of Current Problem Per MD: Mr. 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 Pt has PMH of anxiety, depression and OA.   Comments/Observations Wife Nohemi present and supportive for evaluation.  Pt asked wife to leave upon intiation of evaluation and wife reported he was angry with her and she was not sure why.   Role/Living Environment   Current Community Support Family/friend caregiver   Patient role/Employment history Employed   Community/Avocational Activities Pt is a  and works in IT, pt enjoys watching tv on free time, driving car   Current Living Environment House   Number of Stairs to Enter Home 4   Number of Stairs Within Home 10   Primary Bathroom Location/Comments main   Primary Bathroom Set Up/Equipment Tub/Shower combo;Shower/tub chair   Home/Community Accessibility Comments Walk in shower upstairs   Prior Level - Transfers Independent   Prior Level - Ambulation Independent   Prior Level - ADLS Independent   Prior Responsibilities - IADL Work;Shopping;Yardwork;Medication management;Finances;Driving   Prior Level Comments Fed dogs and managed dogs medications   Patient/family Goals Statement Pt is hoping to go back work,  driving,    Pain   Patient currently in pain No   Fall Risk Screen   Fall screen completed by OT   Cognitive Status Examination   Level of Consciousness Alert   Follows Commands and Answers Questions 100% of the time   Cognitive Comment Pt reports difficulty with finding words and speech.  Noted slowed speech.  Wife reports difficulty with recall,    Visual Perception   Visual Perception Wears glasses   Visual Field right hemianopia   Visual Perception Comments Pt reports having to turn head to the right to see all text with reading   Sensation   Sensation Comments Paresthesia in right hand UE,   Range of Motion (ROM)   ROM Comments WNL   Strength   Strength Comments 4+/5 right shoulder, 4/5 right tricep, 5/5 left UE    Hand Strength   Hand Dominance Right   Left Hand  (pounds) 102 pounds   Right Hand  (pounds) 91 pounds   Left Lateral Pinch (pounds) 27 pounds   Right Lateral Pinch (pounds) 23 pounds   Left Three Point Pinch (pounds) 23 pounds   Right Three Point Pinch (pounds) 23 pounds   Hand Strength Comments Right UE scoring 1-2 SD below average   Coordination   Upper Extremity Coordination Right UE impaired   Gross Motor Coordination mild impairment with diadokinesia and gross motor control   Fine Motor Coordination right UE impaired   Left Hand, Nine Hole Peg Test (seconds) 23:40   Right Hand, Nine Hole Peg Test (seconds) 32:26   Functional Limitations Fine motor ADL performance impaired;Decreased speed   Coordination Comments Pt is scoring below average for age range   Balance   Balance Comments Pt was having spinal headaches were causing lightheaded.  Pt is bumping into items as a result of vision.     Bathing   Level of [place] - Bathing stand-by assist   Upper Body Dressing   Level of [place]: Dress Upper Body independent   Lower Body Dressing   Level of [place]: Dress Lower Body independent   Grooming   Level of [place]: Grooming independent   Eating/Self-Feeding   Level of  "[place]: Eating independent   Activity Tolerance   Activity Tolerance Pt reports he is yawning all day.  \"I don't really do much.\"   Instrumental Activities of Daily Living Assessment   IADL Assessment/Observations Wife is assisting with all IADL tasks at this time.     Planned Therapy Interventions   Planned Therapy Interventions ADL training;IADL training;Cognitive skills;Community/work reintegration;Neuromuscular re-education;Coordination training;Self care/Home management;Strengthening;Visual perception   Adult OT Eval Goals   OT Eval Goals (Adult) 1;2;3;4;5;6;7   OT Goal 1   Goal Identifier 1 Memory compensation   Goal Description Pt will demonstrate Mod I with managing daily schedule, recall for medications and appointments using 1-2 memory strategies consistently 7/7 days per pt and wife report.     Target Date 12/06/18   OT Goal 2   Goal Identifier 2 energy conservation   Goal Description Pt will identify 2-3 strategies to improve endurance and conserve energy and use 5/7 days consistently, per pt report.    Target Date 12/06/18   OT Goal 3   Goal Identifier 3 Visual perceptual skills   Goal Description Pt will demonstrate use of 2 visual scanning strategies to improve safety with navigation and ambulation 5/5 trials in dynamic environments.     Target Date 12/06/18   OT Goal 4   Goal Identifier 4 HEP   Goal Description Pt will complete coordination and strengthening HEP 5/7 days for 10-15 min with mod I to increase strength and coordination for ADL tasks including mowing lawn and typing and  measured through improved  strength and 9 hole peg test.   Target Date 12/06/18   OT Goal 5   Goal Identifier 5 Medication management   Goal Description Pt will complete medication management task with 4+ medications and 100% accuracy and use of 2 strategies for organization.   Target Date 12/06/18   OT Goal 6   Goal Identifier 6 problem solving   Goal Description Pt will demonstrate 90% accuracy on complex " calendar management task with use of 1-2 strategies to improve organization with mod I.     Target Date 12/06/18   OT Goal 7   Goal Identifier 7 Community reintegration   Goal Description Pt will score within normal ranges in all areas assessed related to community reintegration, reaction time, attention and visual scanning with mod I to improve I with running errands and crossing street.   Target Date 12/06/18   Clinical Impression   Criteria for Skilled Therapeutic Interventions Met Yes, treatment indicated   OT Diagnosis Impaired ADL/IADL I   Influenced by the following impairments Impaired strength, endurance, coordination, visual impairment, impaired balance, impaired visual perception, impaired memory.   Assessment of Occupational Performance 5 or more Performance Deficits   Identified Performance Deficits Strength and endurance for ADLs, participation in work, leisure, community mobility tasks.   Clinical Decision Making (Complexity) Moderate complexity   Therapy Frequency 2x/week for 4 weeks then 1x week for 8 more weeks   Predicted Duration of Therapy Intervention (days/wks) 12 weeks   Risks and Benefits of Treatment have been explained. Yes   Patient, Family & other staff in agreement with plan of care Yes   Clinical Impression Comments Pt demonstrates clinical need for skilled OT services to improve ADL/IADL independence and safety.  Pt is motivated to improve and has good family support.     Education Assessment   Barriers To Learning Cognitive   Preferred Learning Style Demonstration   Total Evaluation Time   Total Evaluation Time 24

## 2018-09-14 ENCOUNTER — OFFICE VISIT (OUTPATIENT)
Dept: FAMILY MEDICINE | Facility: CLINIC | Age: 43
End: 2018-09-14
Payer: COMMERCIAL

## 2018-09-14 VITALS
HEIGHT: 73 IN | SYSTOLIC BLOOD PRESSURE: 122 MMHG | DIASTOLIC BLOOD PRESSURE: 77 MMHG | HEART RATE: 81 BPM | WEIGHT: 243.3 LBS | OXYGEN SATURATION: 95 % | TEMPERATURE: 98.3 F | BODY MASS INDEX: 32.25 KG/M2

## 2018-09-14 DIAGNOSIS — G47.00 INSOMNIA, UNSPECIFIED TYPE: ICD-10-CM

## 2018-09-14 DIAGNOSIS — F32.1 MODERATE MAJOR DEPRESSION (H): ICD-10-CM

## 2018-09-14 DIAGNOSIS — I77.74 VERTEBRAL ARTERY DISSECTION (H): Primary | ICD-10-CM

## 2018-09-14 DIAGNOSIS — G47.33 OSA (OBSTRUCTIVE SLEEP APNEA): ICD-10-CM

## 2018-09-14 PROCEDURE — 99214 OFFICE O/P EST MOD 30 MIN: CPT | Performed by: FAMILY MEDICINE

## 2018-09-14 NOTE — MR AVS SNAPSHOT
After Visit Summary   9/14/2018    Vahe Haas    MRN: [id number]           Patient Information     Date Of Birth          1975        Visit Information        Provider Department      9/14/2018 1:00 PM Neida Benz MD [facility]        Today's Diagnoses     Vertebral artery dissection (H)    -  1       Follow-ups after your visit        Follow-up notes from your care team     Return in about 3 months (around 12/14/2018) for Mood, stroke.      Your next 10 appointments already scheduled     Sep 19, 2018  7:15 AM CDT   Neuro Treatment with Meseret Solie, OT   [facility] ([facility])    [address]  [address]  [place] [address]   [phone]            Sep 20, 2018  8:00 AM CDT   Neuro Eval with Allison E Alpers, TOMA   [facility] ([facility])    [address]  [address]  [place] [address]   [phone]            Sep 26, 2018  3:45 PM CDT   Neuro Treatment [facility] Meseret Solie, OT   [facility]    [address]  [address]  [place] [address]   [phone]            Sep 28, 2018  3:00 PM CDT   Neuro Treatment with Meseret Solie, OT   [facility] ([facility])    [address]  [address]  [place] [address]   [phone]            Oct 03, 2018  3:45 PM CDT   Neuro Treatment with Meseret Solie, OT   [facility]    [address]  [address]  [place] [address]   [phone]            Oct 05, 2018  3:00 PM CDT   Neuro Treatment with Meseret Solie, OT   [facility]    [address] [place] [address]  [address]  [place] [address]   [phone]            Oct 10, 2018  8:00 AM CDT   Neuro Treatment with Meseret Solie, OT   [facility]    [address] [place] [address]  Street  [address]  [place] [address]   [phone]            Oct 12, 2018  8:30 AM CDT   Neuro Treatment with Meseret Solie, OT   [facility] ([facility])    [address] [place] [address]  [address]  [place] [address]   [phone]            Oct 19, 2018  9:30 AM CDT   Neuro Treatment with Meseret Solie, OT   [facility] ([facility])    [address]  [address]  [place] [address]   [phone]            Oct 26, 2018  3:00 PM CDT   Neuro Treatment with Meseret Solie, OT   [place] [facility] ([facility])    [address]  [address]  [place] [address]   [phone]              Who to contact     If you have questions or need follow up information about today's clinic visit or your schedule please contact [facility] directly at [phone].  Normal or non-critical lab and imaging results will be communicated to you by 58[employer]hart, letter or phone within 4 business days after the clinic has received the results. If you do not hear from us within 7 days, please contact the clinic through [employer] or phone. If you have a critical or abnormal lab result, we will notify you by phone as soon as possible.  Submit refill requests through [employer] or call your pharmacy and they will forward the refill request to us. Please allow 3 business days for your refill to be completed.          Additional Information About Your Visit        [employer] Information     [employer] gives you secure access to your electronic health record. If you see a primary care provider, you can also send messages to your care team and make appointments. If you have questions, please call your primary care clinic.  If you do not have a primary care provider, please call [phone] and they will assist you.        Care EveryWhere ID     This is your Care EveryWhere ID. This could be used by other organizations to access your [place]  "medical records  [id number]        Your Vitals Were     Pulse Temperature Height Pulse Oximetry BMI (Body Mass Index)       81 98.3  F (36.8  C) (Oral) 6' 0.5\" (1.842 m) 95% 32.54 kg/m2        Blood Pressure from Last 3 Encounters:   09/14/18 122/77   09/06/18 124/70   06/18/18 121/77    Weight from Last 3 Encounters:   09/14/18 243 lb 4.8 oz (110.4 kg)   06/18/18 245 lb 3.2 oz (111.2 kg)   12/01/17 247 lb 9.6 oz (112.3 kg)              Today, you had the following     No orders found for display       Primary Care Provider Office Phone # Fax #    Neida Benz [phone] [address] [address]        Equal Access to Services     [facility]: Hadii aad ku hadasho Soomaali, waaxda luqadaha, qaybta kaalmada adeegyada, waxay mary janein haytodd birch . So [facility] [phone].    ATENCIÓN: Si habla español, tiene a alvarado disposición servicios gratuitos de asistencia lingüística. Abel al [phone].    We comply with applicable federal civil rights laws and [place] laws. We do not discriminate on the basis of race, color, national origin, age, disability, sex, sexual orientation, or gender identity.            Thank you!     Thank you for choosing [facility]  for your care. Our goal is always to provide you with excellent care. Hearing back from our patients is one way we can continue to improve our services. Please take a few minutes to complete the written survey that you may receive in the mail after your visit with us. Thank you!             Your Updated Medication List - Protect others around you: Learn how to safely use, store and throw away your medicines at www.disposemymeds.org.          This list is accurate as of 9/14/18  2:01 PM.  Always use your most recent med list.                   Brand Name Dispense Instructions for use Diagnosis    acetaminophen 325 MG tablet    TYLENOL    100 tablet    Take 2 tablets (650 mg) by mouth " every 4 hours as needed for mild pain    Other headache syndrome       aspirin 325 MG EC tablet     60 tablet    Take 1 tablet (325 mg) by mouth daily    Cerebrovascular accident (CVA) due to other mechanism (H)       bisacodyl 10 MG Suppository    DULCOLAX    5 suppository    Place 1 suppository (10 mg) rectally daily as needed for constipation    Constipation, unspecified constipation type       buPROPion 150 MG 24 hr tablet    WELLBUTRIN XL    90 tablet    TAKE 1 TABLET (150 MG) BY MOUTH EVERY MORNING - NEEDS APPOINTMENT    Major depressive disorder, recurrent episode, moderate (H)       ORDER FOR ALLERGEN IMMUNOTHERAPY 5 mL vial           order for DME      AIRSENSE 10 7-12 CM/H20 NASAL WISP        senna-docusate 8.6-50 MG per tablet    SENOKOT-S;PERICOLACE    100 tablet    Take 1-2 tablets by mouth 2 times daily as needed for constipation    Constipation, unspecified constipation type       sertraline 100 MG tablet    ZOLOFT    90 tablet    Take 1 tablet (100 mg) by mouth daily    Major depressive disorder, recurrent episode, moderate (H)

## 2018-09-14 NOTE — PROGRESS NOTES
SUBJECTIVE:   Vahe Haas is a 43 year old male who presents to clinic today for the following health issues:    Hospital Follow-up Visit:    Hospital/Nursing Home/IP Rehab Facility: [facility]  Date of Admission: 09/03/2018  Date of Discharge: 09/06/2018  Reason(s) for Admission: Right vertebral artery dissection/occlusion  Acute CVA- Lt PCA and RT PICA  Elevated blood pressure  Lumbar puncture headache  Mild left ventricular hypertrophy on ECHO    HPI of sx's prior to dx-  Pt reports in the morning, started seeing triplicate.  Vomited, crawled into bathroom, couldn't stand or do anything.  Crawled back to room to find phone.  Horrible headache.  Slurred speech.  Pounded with his foot on main floor, where his wife heard him.  Came down to see him, called 911.  She did a FAST test while on 911- no facial drooping, could hold arms up, but speech seemed slurred.  Happened between 8:30-9:30am.  In hospital before 9:30am.  In ER, sent to CT, okay.  LP, also clear.  Talked to neurologist, and got CTA, which showed the dissection.      Sx's now-   Speech is 80% better (just a bit slurred now), peripheral vision is better, but still some loss on R side.  Coordination is improving.  Memory is improving slowly.  Still tired all the time, even after good rest.  Trouble getting to sleep, but can get to sleep.  Wife is trying to keep him up more during the day, so he can sleep better at night.  Headaches are gone (since the spinal HA's resolved).     OT - doing lots of memory work (binder as he's not making new memories).  Not remembering five new words, not coming up with words that start with 'F'.  Speech therapy starts tomorrow.    Wife is seeing steady improvement in sx's, and they expect 90-95% improvement in the next few months.  She is most concerned about him developing depression.  Hobbies- legos, guitar   Now- mostly sitting around.      New meds- asa 325mg (lifelong).  Senokot- alternating 1 or  2 tabs every other day -- stools are good, not straining, daily.  Still on zoloft and wellbutrin XL.    Etiology- had a violent cough about a month ago?      Plan- off work x 6 wks as recommended by OT.  F/u with neurology 12/18 with CTA prior.              Problems taking medications regularly:  None       Medication changes since discharge: Added - Aspirin 325 mg        Problems adhering to non-medication therapy:  None    Summary of hospitalization:  [facility] discharge summary reviewed  Diagnostic Tests/Treatments reviewed.  Follow up needed: Reviewed normal CSF culture  Other Healthcare Providers Involved in Patient s Care:         Specialist appointment - Neurology appt in 12/18, OT, speech therapy  Update since discharge: improved.     Post Discharge Medication Reconciliation: discharge medications reconciled, continue medications without change.  Plan of care communicated with patient and family     Coding guidelines for this visit:  Type of Medical   Decision Making Face-to-Face Visit       within 7 Days of discharge Face-to-Face Visit        within 14 days of discharge   Moderate Complexity [id number]   High Complexity [id number]          Problem list and histories reviewed & adjusted, as indicated.  Additional history: as documented    Patient Active Problem List   Diagnosis     Allergic rhinitis     Insomnia     CARDIOVASCULAR SCREENING; LDL GOAL LESS THAN 160     Moderate major depression     Drug-induced erectile dysfunction     JANA (obstructive sleep apnea)     Acute bilateral thoracic back pain     Acute bilateral low back pain without sciatica     Vertebral artery dissection (H)      Current Outpatient Prescriptions   Medication Sig Dispense Refill     acetaminophen (TYLENOL) 325 MG tablet Take 2 tablets (650 mg) by mouth every 4 hours as needed for mild pain 100 tablet 0     bisacodyl (DULCOLAX) 10 MG Suppository Place 1 suppository (10 mg) rectally daily as needed for constipation 5  "suppository 1     buPROPion (WELLBUTRIN XL) 150 MG 24 hr tablet TAKE 1 TABLET (150 MG) BY MOUTH EVERY MORNING - NEEDS APPOINTMENT 90 tablet 1     senna-docusate (SENOKOT-S;PERICOLACE) 8.6-50 MG per tablet Take 1-2 tablets by mouth 2 times daily as needed for constipation 100 tablet 0     sertraline (ZOLOFT) 100 MG tablet Take 1 tablet (100 mg) by mouth daily 90 tablet 1     ALLERGY SHOTS        aspirin 325 MG EC tablet Take 1 tablet (325 mg) by mouth daily 60 tablet 0     order for DME AIRSENSE 10  7-12 CM/H20  NASAL WISP       Allergies   Allergen Reactions     Seasonal Allergies      Recent Labs   Lab Test  09/04/18   1040  09/03/18   0934  02/17/16   1202  02/13/13   1116   12/23/10   1442   A1C   --   5.6   --    --    --    --    LDL  109*   --   124*  122   < >   --    HDL  37*   --   30*  29*   < >   --    TRIG  99   --   167*  98   < >   --    ALT   --   29   --    --    --    --    CR  1.01  1.04  1.02  1.03   < >   --    GFRESTIMATED  81  78  81  82   < >   --    GFRESTBLACK  >90  >90  >90  African American GFR Calc    >90   < >   --    POTASSIUM  4.0  3.5  3.9  3.9   < >   --    TSH   --    --    --    --    --   1.19    < > = values in this interval not displayed.      BP Readings from Last 3 Encounters:   09/14/18 122/77   09/06/18 124/70   06/18/18 121/77    Wt Readings from Last 3 Encounters:   09/14/18 243 lb 4.8 oz (110.4 kg)   06/18/18 245 lb 3.2 oz (111.2 kg)   12/01/17 247 lb 9.6 oz (112.3 kg)                  Labs reviewed in EPIC    Reviewed and updated as needed this visit by clinical staff  Tobacco  Allergies  Meds  Problems       Reviewed and updated as needed this visit by Provider  Allergies  Meds  Problems         ROS:  Constitutional, HEENT, cardiovascular, pulmonary, gi and gu systems are negative, except as otherwise noted.    OBJECTIVE:     /77  Pulse 81  Temp 98.3  F (36.8  C) (Oral)  Ht 6' 0.5\" (1.842 m)  Wt 243 lb 4.8 oz (110.4 kg)  SpO2 95%  BMI 32.54 " kg/m2  Body mass index is 32.54 kg/(m^2).  GENERAL: healthy, alert and no distress  EYES: Eyes grossly normal to inspection, PERRL and conjunctivae and sclerae normal  HENT: ear canals and TM's normal, nose and mouth without ulcers or lesions  NECK: no adenopathy, no asymmetry, masses, or scars and thyroid normal to palpation  RESP: lungs clear to auscultation - no rales, rhonchi or wheezes  CV: regular rate and rhythm, normal S1 S2, no S3 or S4, no murmur, click or rub, no peripheral edema and peripheral pulses strong  ABDOMEN: soft, nontender, no hepatosplenomegaly, no masses and bowel sounds normal  MS: no gross musculoskeletal defects noted, no edema  SKIN: no suspicious lesions or rashes  NEURO: Normal strength and tone, mentation intact and speech normal  PSYCH: mentation appears normal, affect normal/bright  Neuro: CN 2-12 grossly nl, fundi WNL bilaterally, UE and LE strength 5/5, nl sensation and DTR's.  Normal gait.  Normal Romberg.  Normal fine motor movements.  Finger/nose testing is fasting on left than right (almost double).     Diagnostic Test Results:  Results for orders placed or performed during the hospital encounter of 09/03/18   CT Head w/o Contrast    Narrative    CT SCAN OF THE HEAD WITHOUT CONTRAST   9/3/2018 9:52 AM     HISTORY: Severe headache.     TECHNIQUE: Axial images of the head and coronal reformations without  IV contrast material. Radiation dose for this scan was reduced using  automated exposure control, adjustment of the mA and/or kV according  to patient size, or iterative reconstruction technique.    COMPARISON: None.    FINDINGS: The cerebral hemispheres, brainstem, and cerebellum  demonstrate normal morphology and attenuation. No evidence of  ischemia, hemorrhage, mass, mass effect, or hydrocephalus. The  visualized calvarium, skull base, paranasal sinuses, and extracranial  soft tissues are unremarkable.      Impression    IMPRESSION: No acute intracranial abnormality.    GEORGETTE  LAURIE SCOTT MD   CTA Head Neck with Contrast   Result Value Ref Range    Radiologist flags Vascular occlusion (AA)     Narrative    CT ANGIOGRAM OF THE HEAD AND NECK WITH CONTRAST  9/3/2018 1:27 PM     HISTORY: Headache, new onset, evaluate dissection, aneurysm, etc.      TECHNIQUE: CT angiography with an injection of 70 mL [id number]-370 IV  with scans through the head and neck. Images were transferred to a  separate 3-D workstation where multiplanar reformations and 3-D images  were created. Estimates of carotid stenoses are made relative to the  distal internal carotid artery diameters except as noted. Radiation  dose for this scan was reduced using automated exposure control,  adjustment of the mA and/or kV according to patient size, or iterative  reconstruction technique.    COMPARISON: Same day head CT     CT HEAD FINDINGS: No contrast enhancing lesions are identified. Focal  groundglass 1 cm lesion within the left frontal bone likely represents  fibro-osseous lesion or fibrous dysplasia, unchanged.    CT ANGIOGRAM HEAD FINDINGS: The intracranial left vertebral artery,  basilar artery, and posterior cerebral arteries are patent. The  bilateral intracranial internal carotid arteries, middle cerebral  arteries, and anterior cerebral arteries are patent. No aneurysms are  identified. Complete [place] of  is present. Dural venous sinuses  are unremarkable. Right transverse sinus is dominant with diminutive  left transverse sinus.    CT ANGIOGRAM NECK FINDINGS: A 2 vessel aortic arch is present. The  bilateral common carotid, internal carotid, external carotid, and left  vertebral arteries are patent. The right vertebral artery is occluded.  The origin is not well-visualized, however there is asymmetrically  decreased small-volume contrast opacification throughout the mid V2  segment with ultimate reocclusion at the V2-3 junction through the V3  segment. There is trace filling of the V4 segment. Likely  combination  right-sided AICA/PICA artery is present, however there is  asymmetrically decreased contrast opacification of the right posterior  inferior cerebellar artery branches.    There are mild degenerative changes of the cervical spine with  degenerative disc disease most severe at C5-6.       Impression    IMPRESSION: Right vertebral artery dissection/occlusion.    [Critical Result: Vascular occlusion]    Finding was identified on 9/3/2018 1:31 PM.     Dr. Rosen was contacted by me on 9/3/2018 1:38 PM and verbalized  understanding of the critical result.     GEORGETTE SCOTT MD   MRI Brain w & w/o contrast    Narrative    MRI BRAIN WITHOUT AND WITH CONTRAST  9/3/2018 4:47 PM    HISTORY:  Stroke with SWI Sequence.       TECHNIQUE:  Multiplanar, multisequence MRI of the brain without and  with 11mL Gadavist.     COMPARISON: CT studies dated 9/3/2018.     FINDINGS:  Diffusion weighted images reveal multiple areas of  restricted diffusion in the right cerebellum. Areas of restricted  diffusion are seen in the inferior, mid and superior right cerebellum.  There is also an area of restricted diffusion in the left mid and left  superior cerebellum. There is an area of restricted diffusion in the  left posterior and medial temporal lobe and left occipital lobe. This  is in the distribution of the left posterior cerebral artery. All of  these areas of restricted diffusion are consistent with acute  infarcts. There is no hemorrhage or mass effect.  There are no  gadolinium enhancing lesions.   The facial structures appear normal.  The arteries at the base of the brain and the dural venous sinuses  appear patent.       Impression    IMPRESSION:   1. Multiple areas of restricted diffusion including lesions in both  the right and left cerebellum and in the left posterior  temporal-occipital region. These areas of restricted diffusion are  consistent with acute infarcts.  2. No bleed or mass effect.    EVAN STEIN MD   MRA  Neck (Carotids) wo & w Contrast    Narrative    MRA NECK (CAROTIDS) WO & W CONTRAST 9/3/2018 7:37 PM     HISTORY:  presumed dissection, T1 fat sat to confirm diagnosis.;     TECHNIQUE:  Sequential axial images of the neck were obtained using  2-dimensional time-of-flight before contrast and 3-dimensional  time-of-flight after the uneventful administration of 10mL Gadavist iv  contrast.    COMPARISON:  None.    FINDINGS: On the post gadolinium images, the posterior aspects of the  vertebral arteries are not included at the C1 level. Stenosis is  relative to the distal internal carotid diameter.    Right Carotid:  No significant stenosis is seen at the bifurcation  relative to the distal internal carotid diameter.    Left Carotid:  No significant stenosis is seen at the bifurcation  relative to the distal internal carotid diameter.    Vertebrals: Antegrade flow is seen in both vertebral arteries. Left  vertebral artery is dominant. There is a mild-moderate focal stenosis  in the V3 segment of the right distal vertebral artery. Blood flow in  the right vertebral artery has improved when compared to the CT  angiogram. I do not identify any definite intramural hematoma on the  axial T1 with fat-sat images.      Impression    IMPRESSION:   1. Mild-moderate focal stenosis in the V3 segment of the distal right  vertebral artery. Blood flow in the right vertebral artery has  improved when compared to the earlier CT angiogram. No focal  intramural hematoma is identified. No definite arterial dissection is  identified.  2. No stenosis at the carotid bifurcations.    EVAN STEIN MD   CT Head w/o Contrast    Narrative    CT SCAN OF THE HEAD WITHOUT CONTRAST   9/4/2018 8:59 AM     HISTORY: Cerebellar stroke, evaluate edema.     TECHNIQUE:  Axial images of the head and coronal reformations without  IV contrast material. Radiation dose for this scan was reduced using  automated exposure control, adjustment of the mA and/or kV  according  to patient size, or iterative reconstruction technique.    COMPARISON: MRI 9/3/2018, head CT 9/3/2018    FINDINGS:  Hypoattenuation and loss of gray-white differentiation  involving the left posterior thalamus, medial temporal lobe, and  occipital lobe are present corresponding to areas of diffusion  restriction on prior MRI. Patchy areas of hypoattenuation throughout  the bilateral cerebellum, right worse than left, are present  corresponding to areas of diffusion ejection on prior MRI. No evidence  of intracranial hemorrhage. The fourth ventricle remains patent. No  evidence of hydrocephalus. The remaining parenchyma is unremarkable.  No new areas of ischemia identified. The visualized calvarium, skull  base, paranasal sinuses, and extracranial soft tissues are  unremarkable.      Impression    IMPRESSION:  Expected evolution of bilateral posterior circulation  infarcts. No evidence of hemorrhage or hydrocephalus.    GEORGETTE SCOTT MD   CT Head w/o Contrast    Narrative    CT SCAN OF THE HEAD WITHOUT CONTRAST   9/5/2018 6:28 AM     HISTORY:  Follow up cerebellar edema.     TECHNIQUE:  Axial images of the head and coronal reformations without  IV contrast material. Radiation dose for this scan was reduced using  automated exposure control, adjustment of the mA and/or kV according  to patient size, or iterative reconstruction technique.    COMPARISON: Head CT 9/4/2018 and brain MR 9/3/2018.    FINDINGS: Compared to most recent head CT 9/4/2018, there is better  definition of the areas of infarct in the inferomedial left occipital  lobe, left hippocampus, as well as bilateral cerebellar hemispheres,  right greater than left. These areas of infarct or more hypodense  compared to prior with slightly better defined margins. There is  persistent mass effect on the right aspect of the fourth ventricle.  Cerebellar edema may be slightly increased since prior as the right  cerebellar tonsil extends below the  foramen magnum in a slightly  greater extent since prior.    No evidence of acute intracranial hemorrhage. While there is mass  effect on the fourth ventricle, the lateral and third ventricles do  not appear enlarged and are not significantly changed in size since  prior. No new abnormal extra-axial fluid collection.    The visualized portions of the sinuses and mastoids appear normal. The  bony calvarium and bones of the skull base appear intact.       Impression    IMPRESSION: Continued evolution of bilateral cerebellar infarcts and  left cerebral infarcts compared to recent head CT 9/4/2018. Cerebellar  edema appears to be slightly increased since prior with persistent  mass effect on the right aspect of the fourth ventricle. No evidence  of hydrocephalus. No evidence of hemorrhagic transformation.    Results discussed with nurse Ginny Baer at 7:52 AM on 9/5/2018.    TRENT MCINTYRE MD   CT Head w/o Contrast    Narrative    CT SCAN OF THE HEAD WITHOUT CONTRAST   9/6/2018 8:53 AM     HISTORY: Obstructive hydrocephalus secondary to posterior fossa edema  question.     TECHNIQUE:  Axial images of the head and coronal reformations without  IV contrast material.  Radiation dose for this scan was reduced using  automated exposure control, adjustment of the mA and/or kV according  to patient size, or iterative reconstruction technique.    COMPARISON: 9/5/2018.    FINDINGS: Again seen are evolving acute to subacute ischemic infarcts  in the cerebellum and left occipital lobes. There is some edema in  mass effect causing slight distortion of the fourth ventricle and  quadrigeminal plate cisterns, but there is no hydrocephalus at this  time. There is no evidence for any hemorrhagic transformation. Brain  parenchyma is otherwise normal. There is no evidence for skull  fracture or any new or acute ischemic infarct. Visualized paranasal  sinuses and mastoid air cells are clear.      Impression    IMPRESSION: Evolving  acute to subacute ischemic infarcts in the  cerebellar hemispheres and left occipital lobe with some associated  mass effect. There is no evidence for hemorrhagic transformation or  any hydrocephalus.    TIMOTHY RIBEIRO MD   CBC with platelets + differential   Result Value Ref Range    WBC 7.5 4.0 - 11.0 10e9/L    RBC Count 5.10 4.4 - 5.9 10e12/L    Hemoglobin 15.4 13.3 - 17.7 g/dL    Hematocrit 43.5 40.0 - 53.0 %    MCV 85 78 - 100 fl    MCH 30.2 26.5 - 33.0 pg    MCHC 35.4 31.5 - 36.5 g/dL    RDW 13.1 10.0 - 15.0 %    Platelet Count 156 150 - 450 10e9/L    Diff Method Automated Method     % Neutrophils 57.7 %    % Lymphocytes 31.1 %    % Monocytes 7.1 %    % Eosinophils 3.5 %    % Basophils 0.3 %    % Immature Granulocytes 0.3 %    Nucleated RBCs 0 0 /100    Absolute Neutrophil 4.3 1.6 - 8.3 10e9/L    Absolute Lymphocytes 2.3 0.8 - 5.3 10e9/L    Absolute Monocytes 0.5 0.0 - 1.3 10e9/L    Absolute Eosinophils 0.3 0.0 - 0.7 10e9/L    Absolute Basophils 0.0 0.0 - 0.2 10e9/L    Abs Immature Granulocytes 0.0 0 - 0.4 10e9/L    Absolute Nucleated RBC 0.0    Basic metabolic panel   Result Value Ref Range    Sodium 143 133 - 144 mmol/L    Potassium 3.5 3.4 - 5.3 mmol/L    Chloride 109 94 - 109 mmol/L    Carbon Dioxide 25 20 - 32 mmol/L    Anion Gap 9 3 - 14 mmol/L    Glucose 158 (H) 70 - 99 mg/dL    Urea Nitrogen 16 7 - 30 mg/dL    Creatinine 1.04 0.66 - 1.25 mg/dL    GFR Estimate 78 >60 mL/min/1.7m2    GFR Estimate If Black >90 >60 mL/min/1.7m2    Calcium 8.0 (L) 8.5 - 10.1 mg/dL   Hepatic panel   Result Value Ref Range    Bilirubin Direct <0.1 0.0 - 0.2 mg/dL    Bilirubin Total 0.3 0.2 - 1.3 mg/dL    Albumin 3.7 3.4 - 5.0 g/dL    Protein Total 6.9 6.8 - 8.8 g/dL    Alkaline Phosphatase 81 40 - 150 U/L    ALT 29 0 - 70 U/L    AST 22 0 - 45 U/L   Hemoglobin A1c   Result Value Ref Range    Hemoglobin A1C 5.6 0 - 5.6 %   Glucose by meter   Result Value Ref Range    Glucose 146 (H) 70 - 99 mg/dL   Glucose by meter   Result Value  Ref Range    Glucose 134 (H) 70 - 99 mg/dL   Troponin I   Result Value Ref Range    Troponin I ES <0.015 0.000 - 0.045 ug/L   Basic metabolic panel   Result Value Ref Range    Sodium 142 133 - 144 mmol/L    Potassium 4.0 3.4 - 5.3 mmol/L    Chloride 109 94 - 109 mmol/L    Carbon Dioxide 24 20 - 32 mmol/L    Anion Gap 9 3 - 14 mmol/L    Glucose 98 70 - 99 mg/dL    Urea Nitrogen 16 7 - 30 mg/dL    Creatinine 1.01 0.66 - 1.25 mg/dL    GFR Estimate 81 >60 mL/min/1.7m2    GFR Estimate If Black >90 >60 mL/min/1.7m2    Calcium 8.6 8.5 - 10.1 mg/dL   Lipid panel reflex to direct LDL   Result Value Ref Range    Cholesterol 166 <200 mg/dL    Triglycerides 99 <150 mg/dL    HDL Cholesterol 37 (L) >39 mg/dL    LDL Cholesterol Calculated 109 (H) <100 mg/dL    Non HDL Cholesterol 129 <130 mg/dL   EKG 12 lead   Result Value Ref Range    Interpretation ECG Click View Image link to view waveform and result    Neurology IP Consult: Stroke (potential or actual); Patient to be seen: Routine - within 24 hours; CVA; Consultant may enter orders: Yes    Narrative    Alvin Fallon MD     9/3/2018 11:06 PM  [facility]      Stroke Consult Note      HPI  Vahe Haas is a 43 year old male who presents with right   vertebral artery dissection with occlusion in the V3 segment and   R PICA infarct, left PCA and additional scattered posterior   circulation stroke.   He initially presented with vomiting and   worst HA of his life.  He had a negative Head CT in the ED   followed by unremarkable LP: W7, R8, P41, G63.  CTA revealed   probable dissection.  His exam has improved, he is fairly   amnestic to his initial presentation.      His CTA Head/Neck was suspicious for dissection.    ROS: severe coughing with a cold two weeks ago with some initial   headache/neck pain at that time.      Stroke/TIA Evaluation summarized  MRI/Head CT: as above  Vessel imaging: probable dissection, MRI with T1 fat suppression    pending      Stroke Scales      National Institutes of Health Stroke Scale (at presentation)   NIHSS done at:  time patient seen      Score    Level of consciousness:  (0)   Alert, keenly responsive    LOC questions:  (0)   Answers both questions correctly    LOC commands:  (0)   Performs both tasks correctly    Best gaze:  (0)   Normal    Visual:  (2)   Complete hemianopia    Facial palsy:  (0)   Normal symmetrical movements    Motor arm (left):  (0)   No drift    Motor arm (right):  (0)   No drift    Motor leg (left):  (0)   No drift    Motor leg (right):  (0)   No drift    Limb ataxia:  (0)   Absent    Sensory:  (0)   Normal- no sensory loss    Best language:  (1)   Mild to moderate aphasia    Dysarthria:  (1)   Mild to moderate dysarthria    Extinction and inattention:  (0)   No abnormality        NIHSS Total Score:  4          Endovascular Treatment:  Not pursued because of lack of large vessel occlusion    TPA Treatment:  Not given due to outside the time window.  Impression:  1. Vertebral artery dissection with left PCA and right PICA   infarcts, additional scattered post circ stroke    Recommendations  1. q2 neurochecks  2. Aspirin 325mg daily  3. MRA neck to confirm dissection.  If positive, no need for   remainder of stroke work-up (Echo, lipids, etc)   4. Speech/PT/OT  5. Head CT in am to eval for posterior fossa edema    Alvin Fallon MD, MS  Vascular Neurology  September 3, 2018    Please contact the stroke service with any questions: link to   Text page or feel free to call my cellphone.  I personally reviewed all relevant labs and neuroimaging.  I   spent 50 minutes reviewing labs, diagnostic studies,   neuroimaging, and evaluating the patient.    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, also had until age   4, last sz around age 21         Current Facility-Administered Medications:      acetaminophen (TYLENOL) tablet 650 mg, 650 mg, Oral, Q4H PRN,   Matteo Godoy MD, 650 mg at 09/03/18 1709     aspirin EC tablet 325 mg, 325 mg, Oral, Daily, 325 mg at   09/03/18 1547 **OR** aspirin Suppository 300 mg, 300 mg, Rectal,   Daily, Matteo Godoy MD     atorvastatin (LIPITOR) tablet 40 mg, 40 mg, Oral or NG Tube,   Daily at 8 pm, Matteo Godoy MD     bisacodyl (DULCOLAX) Suppository 10 mg, 10 mg, Rectal, Daily   PRN, Matteo Godoy MD     [START ON 9/4/2018] buPROPion (WELLBUTRIN XL) 24 hr tablet 150   mg, 150 mg, Oral, Daily, Matteo Godoy MD     HYDROcodone-acetaminophen (NORCO) 5-325 MG per tablet 1-2   tablet, 1-2 tablet, Oral, Q4H PRN, Matteo Godoy MD     HYDROmorphone (PF) (DILAUDID) injection 0.3-0.5 mg, 0.3-0.5   mg, Intravenous, Q2H PRN, Matteo Godoy MD     labetalol (NORMODYNE/TRANDATE) injection 10-40 mg, 10-40 mg,   Intravenous, Q10 Min PRN, Matteo Godoy MD     magnesium hydroxide (MILK OF MAGNESIA) suspension 30 mL, 30   mL, Oral, Daily PRN, Matteo Godoy MD     Medication Instruction, , Does not apply, Continuous PRN,   Matteo Godoy MD     melatonin tablet 1 mg, 1 mg, Oral, At Bedtime PRN, Matteo Godoy MD     naloxone (NARCAN) injection 0.1-0.4 mg, 0.1-0.4 mg,   Intravenous, Q2 Min PRN, Matteo Godoy MD     ondansetron (ZOFRAN-ODT) ODT tab 4 mg, 4 mg, Oral, Q6H PRN   **OR** ondansetron (ZOFRAN) injection 4 mg, 4 mg, Intravenous,   Q6H PRN, Matteo Godoy MD     prochlorperazine (COMPAZINE) injection 10 mg, 10 mg,   Intravenous, Q6H PRN **OR** prochlorperazine (COMPAZINE) tablet   10 mg, 10 mg, Oral, Q6H PRN **OR** prochlorperazine (COMPAZINE)   Suppository 25 mg, 25 mg, Rectal, Q12H  PRN, Matteo Godoy MD     senna-docusate (SENOKOT-S;PERICOLACE) 8.6-50 MG per tablet 1   tablet, 1 tablet, Oral, BID PRN **OR** senna-docusate   (SENOKOT-S;PERICOLACE) 8.6-50 MG per tablet 2 tablet, 2 tablet,   Oral, BID PRN, Matteo Godoy MD     [START ON 9/4/2018] sertraline (ZOLOFT) tablet 100 mg, 100 mg,   Oral, Daily, Matteo Godoy MD     sodium chloride 0.9% infusion, , Intravenous, Continuous,   Matteo Godoy MD, Last Rate: 100 mL/hr at 09/03/18 1710    Social history & family history reviewed, please refer to   admission H&P    ROS: completed in detail and negative unless otherwise noted   above.    Vital Signs:  B/P: 142/86, T: 98.2, P: 76, R: 16     Neuro:  Mental status: Awake, alert, attentive, oriented x3. Speech is   dysarthric with word-finding difficulties and word substitutions.    Follows simple commands.    No neglect.  Cranial nerves: EOMI, R VF cutl, face symmetric, facial sensation   intact, shoulder shrug strong, tongue/uvula midline, hearing   intact to conversation.  Motor: 5/5 strength in all 4 extremities without drift.  Sensory): Intact to light touch in face, arms, and legs  Coordination: Finger to nose intact bilaterally without   dysmetria.  Normal heel-shin test bilaterally  Gait: Deferred     Labs/Studies:  CBC:     Recent Labs  Lab 09/03/18  0934   WBC 7.5   RBC 5.10   HGB 15.4   HCT 43.5        Basic Metabolic Panel:   Recent Labs   Lab Test  09/03/18   0934  02/17/16   1202  02/13/13   1116   NA  143  142  142   POTASSIUM  3.5  3.9  3.9   CHLORIDE  109  108  105   CO2  25  23  23   BUN  16  18  20   CR  1.04  1.02  1.03   GLC  158*  97  92   KHADIJAH  8.0*  8.9  8.9     Liver panel:  Recent Labs   Lab Test  09/03/18   0934   PROTTOTAL  6.9   ALBUMIN  3.7   BILITOTAL  0.3   ALKPHOS  81   AST  22   ALT  29     INR:No lab results found.   Lipid Profile:  Recent Labs   Lab Test  02/17/16   1202  02/13/13   1116  03/16/12   1113   CHOL  187  170  193    HDL  30*  29*  34*   LDL  124*  122  145*   TRIG  167*  98  67   CHOLHDLRATIO   --   5.9*  5.6*     A1C:   Recent Labs   Lab Test  09/03/18   0934   A1C  5.6     Troponin I: No lab results found.      Imaging:  Relevant findings as per the Impression above.       Care Coordinator IP Consult    Narrative    Cosme Vieira RN     9/6/2018  3:08 PM  Care Transition Initial Assessment - RN  Reason For Consult: care coordination/care conference, discharge   planning   Met with: Patient, wife, and parents.  DATA   Active Problems:    Vertebral artery dissection (H)  Cognitive Status: awake, alert and oriented.  Primary Care Clinic Name: [facility] [place]  Primary Care MD Name: Dr Carlin  Contact information and PCP information verified: Yes  Lives With: spouse  Living Arrangements: house   Who is your support system?: Wife     Insurance concerns: No Insurance issues identified  ASSESSMENT  Patient currently receives the following services:  none   Identified issues/concerns regarding health management:  Pt has a   verys supportive wife who can assist pt 24/7 the next 10 days.    Pt/Wife have a good understanding of DC plan and F/U appts and   cares.  Pt have no other questions or concerns re. DC.   PLAN  Financial costs for the patient include TBD .  Patient given options and choices for discharge yes .  Patient/family is agreeable to the plan?  Yes:  outpt OT/SLP  Patient anticipates discharging to home .  Patient anticipates needs for home equipment: Yes  Plan/Disposition: Home   Appointments:  PCP   On 9/14, CT-Angio 12/6.  I called Dr Forrest's clinic and they will have to call pt or wife as Dr Forrest's has availabel schedule in December.  The following   was entered in AVS:     You have CT-Angio scheduled on December 6 at 11:30.  Please   check-in 30 minutes early and do not eat or drink at least 2   hours prior to procedure. Please see further instruction and   scheduled appointment sections for more  information regarding   your CT-angio.    Neurology follow up in 12 weeks.  Dr Forrest's  will   call you/wife to schedule your appointment.  If you do not hear   from them by noon tomorrow please call clinic at [phone].    Care  (CTS) will continue to follow as   needed.  Glenna Vieira, RN, BSN  Care Coordinator, [facility]  [phone]             Echo Complete Bubble    Narrative    [id number]  [facility]  [id number]  [id number]^NAUN^DIMA^RUTH           [facility]  Echocardiography Laboratory  [address] [place] [address]        Name: VIDHI MAGANA  MRN: [id number]  : 1975  Study Date: 2018 10:25 AM  Age: 43 yrs  Gender: Male  Patient Location: [facility]  Reason For Study: CVA  Ordering Physician: DIMA MAGALLON  Referring Physician: Neida Benz  Performed By: Shelly Rodriguez,     BSA: 2.3 m2  Height: 72 in  Weight: 240 lb  HR: 75  BP: 132/86 mmHg  _____________________________________________________________________________  __        Procedure  Complete Portable Bubble Echo Adult.  _____________________________________________________________________________  __        Interpretation Summary     Pt unable to lay on left side d/t LP headache-this limits some image  acquisition  There is mild concentric left ventricular hypertrophy.  Left ventricular systolic function is normal.  Intact atrial septum  A contrast injection (Bubble Study) was performed that was negative for flow  across the interatrial septum.  _____________________________________________________________________________  __        Left Ventricle  The left ventricle is normal in size. There is mild concentric left  ventricular hypertrophy. Left ventricular systolic function is normal. The  visual ejection fraction is estimated at 65-70%. Left ventricular diastolic  function is normal. Normal left ventricular wall motion. There is no  thrombus  seen in the left ventricle.     Right Ventricle  The right ventricle is normal in structure, function and size.     Atria  Normal left atrial size. Right atrial size is normal. Intact atrial septum. A  contrast injection (Bubble Study) was performed that was negative for flow  across the interatrial septum.     Mitral Valve  The mitral valve leaflets appear normal. There is no evidence of stenosis,  fluttering, or prolapse.        Tricuspid Valve  Normal tricuspid valve. Right ventricular systolic pressure could not be  approximated due to inadequate tricuspid regurgitation.     Aortic Valve  Normal tricuspid aortic valve.     Pulmonic Valve  The pulmonic valve is not well seen, but is grossly normal.     Vessels  Normal size aorta.     Pericardium  The pericardium appears normal.        Rhythm  Sinus rhythm was noted.  _____________________________________________________________________________  __  MMode/2D Measurements & Calculations  IVSd: 1.5 cm     LVIDd: 3.8 cm  LVIDs: 2.6 cm  LVPWd: 1.5 cm  FS: 32.8 %  LV mass(C)d: 218.9 grams  LV mass(C)dI: 95.1 grams/m2  Ao root diam: 3.3 cm  LA dimension: 3.9 cm  asc Aorta Diam: 2.6 cm  LA/Ao: 1.2  LA Volume (BP): 46.2 ml  LA Volume Index (BP): 20.1 ml/m2  RWT: 0.76           Doppler Measurements & Calculations  MV E max kelley: 96.5 cm/sec  MV A max kelley: 79.1 cm/sec  MV E/A: 1.2  MV dec time: 0.25 sec  PA acc time: 0.08 sec  E/E' av.5  Lateral E/e': 8.3  Medial E/e': 10.6           _____________________________________________________________________________  __           Report approved by: Marcell Garcia 2018 01:33 PM      Glucose CSF: Tube 2   Result Value Ref Range    Glucose CSF 63 40 - 70 mg/dL   Protein total CSF: Tube 2   Result Value Ref Range    Protein Total CSF 41 15 - 60 mg/dL   Gram stain   Result Value Ref Range    Specimen Description Cerebrospinal fluid     Special Requests TUBE 3     Gram Stain No organisms seen     Gram Stain        Rare  WBC'S seen  predominantly mononuclear cells      Gram Stain       Gram stain review consistent with reported results.  Gram stain slide reviewed at the [facility]     CSF Culture Aerobic Bacterial   Result Value Ref Range    Specimen Description Cerebrospinal fluid     Special Requests TUBE 3     Culture Micro No growth    Cell count with differential CSF: Tube 4   Result Value Ref Range    WBC CSF 7 (H) 0 - 5 /uL    RBC CSF 8 (H) 0 - 2 /uL    % Lymphocytes CSF 64 %    % Mono/Macros CSF 36 %    Tube Number 4 #    Color CSF Colorless CLRL^Colorless    Appearance CSF Clear CLER^Clear       ASSESSMENT/PLAN:       ICD-10-CM    1. Vertebral artery dissection (H) I77.74    2. Moderate major depression F32.1    3. Insomnia, unspecified type G47.00    4. JANA (obstructive sleep apnea) G47.33      --Vertebral Artery Dissection, with subsequent Acute CVA- Lt PCA and RT PICA. In hospital, received supportive cares, no surgery or anti-thrombotics (outside window).  Put on asa 325mg - lifelong.  No need for echo/lipids as not atherogenic cause, though possibly from violent cough a month earlier.  Stools doing well with stool meds- will cont prn.  OT/Speech therapy.  F/u with neurology in 12/18.   Encouraged him to be active as tolerated, try guitar.  F/u in 3-4 months for physical, after neurology visit, sooner if any concerns.    MDD- stable, but will watch for worsening.  Will cont on wellbutrin XL for now.    JANA- pt feels his CPAP is still working well.  WIll cont.  F/u at sleep clinic if changes.    Neida Benz MD  [facility]

## 2018-09-15 ASSESSMENT — PATIENT HEALTH QUESTIONNAIRE - PHQ9: SUM OF ALL RESPONSES TO PHQ QUESTIONS 1-9: 14

## 2018-09-15 NOTE — PROGRESS NOTES
"9/15/2018 12:35  Received a call from Rm via [employer] stating, \"Per Dr. Fallon Patient should follow-up with [facility] [[facility]].\"  I called pt and pt's spouse and left them [facility]'s phone number via  and also I left them my call back number for questions.      "

## 2018-09-19 ENCOUNTER — HOSPITAL ENCOUNTER (OUTPATIENT)
Dept: OCCUPATIONAL THERAPY | Facility: CLINIC | Age: 43
Setting detail: THERAPIES SERIES
End: 2018-09-19
Attending: PHYSICIAN ASSISTANT
Payer: COMMERCIAL

## 2018-09-19 PROCEDURE — 97535 SELF CARE MNGMENT TRAINING: CPT | Mod: GO | Performed by: OCCUPATIONAL THERAPIST

## 2018-09-19 PROCEDURE — 97530 THERAPEUTIC ACTIVITIES: CPT | Mod: GO | Performed by: OCCUPATIONAL THERAPIST

## 2018-09-19 PROCEDURE — 40000125 ZZHC STATISTIC OT OUTPT VISIT: Performed by: OCCUPATIONAL THERAPIST

## 2018-09-19 PROCEDURE — 97112 NEUROMUSCULAR REEDUCATION: CPT | Mod: GO | Performed by: OCCUPATIONAL THERAPIST

## 2018-09-20 ENCOUNTER — HOSPITAL ENCOUNTER (OUTPATIENT)
Dept: SPEECH THERAPY | Facility: CLINIC | Age: 43
Setting detail: THERAPIES SERIES
End: 2018-09-20
Attending: PHYSICIAN ASSISTANT
Payer: COMMERCIAL

## 2018-09-20 PROCEDURE — 92523 SPEECH SOUND LANG COMPREHEN: CPT | Mod: GN | Performed by: STUDENT IN AN ORGANIZED HEALTH CARE EDUCATION/TRAINING PROGRAM

## 2018-09-20 PROCEDURE — 40000211 ZZHC STATISTIC SLP  DEPARTMENT VISIT: Performed by: STUDENT IN AN ORGANIZED HEALTH CARE EDUCATION/TRAINING PROGRAM

## 2018-09-20 NOTE — PROGRESS NOTES
" [facility] OP SLP Speech and Language Evaluation  09/20/18 0800   General Information   Type of Evaluation Speech and Language   Type Of Visit Initial   Start Of Care Date 09/20/18   Referring Physician Deb Christiansen PA-C   Orders Evaluate And Treat   Medical Diagnosis CVA   Onset Of Illness/injury Or Date Of Surgery 09/03/18   Precautions/Limitations no known precautions/limitations   Hearing WFL for 1:1 conversation in session   Surgical/Medical history reviewed Yes   Pertinent History Of Current Problem 44yo right-handed male who was admitted to the hospital on 9/3/18 with right vertebral artery dissection/occlusion and acute CVAs of the R/L cerebellum and left temporal-occipital region (L PCA and R PICA infarcts).  Pt was discharged home on 9/6/2018.  He reports continued difficulty with word finding, noting that he will stop in the middle of sentences because he \"doesn't know how to finish.\"  He notes a slow rate of speech with his wife noting that he sounds \"drunk.\"  He reports that he \"gets ahead of himself\" when writing, often writing letters before he needs them.  He denies significant difficulties with reading and listening comprehension, although wife reports that he has some continued vision deficits on the right.  Pt's wife also reports that he has a memory book from OT.  PMH significant for depression, JANA.   Prior Level Of Function Comment No previous problems.   Patient Role/employment History Employed  (; plans to return to work in 5 weeks)   Living environment [facility] [address]   General Observations Pt was accompanied by his wife, Sisi.   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  Conversational speech is largely intelligible.   Language: Auditory Comprehension (understanding of spoken language)   Tests were administered at the following levels Complex (vocation/community/social activities)   Commands; [place] Diagnostic Aphasia Exam 3 (out of 15 total) 15   Paragraph; Discourse Comprehension Test (out of 8 total; less than 7 is below mean) 6.5   Functional Assessment Scale (Auditory Comprehension) Minimal Impairment   Comments (Auditory Comprehension) Errors likely secondary to memory deficits rather than auditory comprehension based on previous normal testing.   Language: Verbal Expression (use of spoken language to express information)   Tests were administered at the following levels Complex (vocation/community/social activities)   [place] Naming Test, short form (out of 15 total) 15   Define Words; [place] Test for Differential Diagnosis Of Aphasia (out of 10 total) 10   Generative Naming Score; Cognitive Linguistic Quick Test 6   Generative Naming; Cognitive Linguistic Quick Test Result WNL  (Mean=6.57, SD=1.49)   Conversation; [place] Diagnostic Aphasia Exam rating (out of 5 total) 4   Functional Assessment Scale (Verbal Expression) Mild Impairment   Comments (Verbal Expression) Mild word finding difficulties in complex verbal expression tasks.  While generate naming standard score was at the low end of normal limits, this likely represents a deficit for him  given previous high level of functioning.   Reading Comprehension (understanding of written language)   Tests were administered at the following levels Complex (vocation/community/social activities)   Sentences and Paragraphs; [place] Diagnostic Aphasia Exam (out of 10 total) 9   Practical Reading (out of 7 total) 7   Functional Assessment Scale (Reading Comprehension) Minimal Impairment   Comments (Reading Comprehension) Errors occur for comprehension of longer paragraphs.  Visual deficits may also be affecting comprehension.   Written Expression (use of writing to express information)   Tests were administered at the following levels Moderate (routine daily activities)   Generate Sentences; [place] Test for Differential Diagnosis Of Aphasia (out of 6 total) 6   Functional Assessment Scale (Written Expression) No Impairment   Comments (Written Expression) Pt/spouse reporting his handwriting is 80% of normal.  Pt is 100% accurate for grammar and spelling when writing sentences today.   Education Assessment   Barriers to Learning No barriers   Preferred Learning Style Listening;Reading;Demonstration;Pictures/video   General Therapy Interventions   Planned Therapy Interventions Language;Communication   Language Verbal expression;Written expression;Reading comprehension   Communication Improve speech intelligibility   Clinical Impression, SLP Eval   Criteria for Skilled Therapeutic Interventions Met yes;treatment indicated   SLP Diagnosis Mild expressive aphasia, mild dysarthria   Influenced by the following factors/impairments Right vertebral artery dissection and CVAs   Functional limitations due to impairments Pt has difficulty expressing his thoughts in conversation   Rehab potential affected by Short time since onset, relatively young age, highly motivated   Therapy Frequency 2 times;per week   Predicted Duration of Therapy Intervention (days/wks) 5 weeks with intention of updating POC upon pt's anticipated  return to work.   Risks and Benefits of Treatment have been explained. Yes   Patient, Family & other staff in agreement with plan of care Yes   Clinical Impression Comments Mr. Haas presents with mild speech and language deficits s/p right vertebral artery dissection and CVAs on 9/3/2018.  Dysarthria is mild in severity and is characterized by imprecise articulation, glottal gilbert, and slow rate.  Language deficits are also mild in severity and are primarily expressive in nature, with word finding deficits in conversation and complex structured tasks.  Pt is also demonstrating mild reading comprehension deficits for lengthier material.  Pt is currently planning on returning to work in 5 weeks.  RECOMMEND: a course of skilled speech therapy targeting verbal/written expression, reading comprehension, and speech intelligibility skills, in order to improve functioning for daily communication at work and at home.   Language/Cognition Goals   Language/Cognition Goals 1;2;3;4   Language/Cognition Goal 1   Goal Identifier Speech   Goal Description Patient will learn, implement, and demonstrate use of speech intelligibility strategies to increase intelligibility at the extended reading and spontaneous conversation level to >95% accy given min cues to meet daily communication needs.   Target Date 12/19/18   Language/Cognition Goal 2   Goal Identifier Verbal expression   Goal Description Patient will learn, implement, and demonstrate use of 3 word-finding strategies with 90% accuracy during complex word finding tasks given min cues in order to support verbal expression for everyday language tasks at work and at home.   Target Date 12/19/18   Language/Cognition Goal 3   Goal Identifier Written expression   Goal Description Patient will complete a complex writing task (e.g. write or type an email 2-3 paragraphs in length) with 90% accy for spelling and grammar when given min assist to meet daily writing needs.   Target Date  12/19/18   Language/Cognition Goal 4   Goal Identifier Reading comprehension   Goal Description Patient will complete a complex reading task (e.g. read a 1-2 paragraph length story and answer questions) with 90% accy when given min assist to meet daily functional reading needs.   Target Date 12/19/18   Total Session Time   Total Evaluation Time 50     Thank you for the referral of this patient.    Allison Alpers, B.A. (music), M.A., CCC-SLP  Speech-Language Pathologist  Certificate of Vocology  [facility]  [phone]

## 2018-09-21 ENCOUNTER — HOSPITAL ENCOUNTER (OUTPATIENT)
Dept: OCCUPATIONAL THERAPY | Facility: CLINIC | Age: 43
Setting detail: THERAPIES SERIES
End: 2018-09-21
Attending: FAMILY MEDICINE
Payer: COMMERCIAL

## 2018-09-21 PROCEDURE — 40000125 ZZHC STATISTIC OT OUTPT VISIT: Performed by: OCCUPATIONAL THERAPIST

## 2018-09-21 PROCEDURE — 97530 THERAPEUTIC ACTIVITIES: CPT | Mod: GO | Performed by: OCCUPATIONAL THERAPIST

## 2018-09-21 PROCEDURE — 97535 SELF CARE MNGMENT TRAINING: CPT | Mod: GO | Performed by: OCCUPATIONAL THERAPIST

## 2018-09-24 ENCOUNTER — HOSPITAL ENCOUNTER (OUTPATIENT)
Dept: SPEECH THERAPY | Facility: CLINIC | Age: 43
Setting detail: THERAPIES SERIES
End: 2018-09-24
Attending: FAMILY MEDICINE
Payer: COMMERCIAL

## 2018-09-24 PROCEDURE — 92507 TX SP LANG VOICE COMM INDIV: CPT | Mod: GN | Performed by: STUDENT IN AN ORGANIZED HEALTH CARE EDUCATION/TRAINING PROGRAM

## 2018-09-24 PROCEDURE — 40000211 ZZHC STATISTIC SLP  DEPARTMENT VISIT: Performed by: STUDENT IN AN ORGANIZED HEALTH CARE EDUCATION/TRAINING PROGRAM

## 2018-09-26 ENCOUNTER — HOSPITAL ENCOUNTER (OUTPATIENT)
Dept: OCCUPATIONAL THERAPY | Facility: CLINIC | Age: 43
Setting detail: THERAPIES SERIES
End: 2018-09-26
Attending: PHYSICIAN ASSISTANT
Payer: COMMERCIAL

## 2018-09-26 PROCEDURE — 40000125 ZZHC STATISTIC OT OUTPT VISIT: Performed by: OCCUPATIONAL THERAPIST

## 2018-09-26 PROCEDURE — 97535 SELF CARE MNGMENT TRAINING: CPT | Mod: GO | Performed by: OCCUPATIONAL THERAPIST

## 2018-09-27 ENCOUNTER — HOSPITAL ENCOUNTER (OUTPATIENT)
Dept: SPEECH THERAPY | Facility: CLINIC | Age: 43
Setting detail: THERAPIES SERIES
End: 2018-09-27
Attending: FAMILY MEDICINE
Payer: COMMERCIAL

## 2018-09-27 PROCEDURE — 92507 TX SP LANG VOICE COMM INDIV: CPT | Mod: GN | Performed by: STUDENT IN AN ORGANIZED HEALTH CARE EDUCATION/TRAINING PROGRAM

## 2018-09-27 PROCEDURE — 40000211 ZZHC STATISTIC SLP  DEPARTMENT VISIT: Performed by: STUDENT IN AN ORGANIZED HEALTH CARE EDUCATION/TRAINING PROGRAM

## 2018-09-28 ENCOUNTER — HOSPITAL ENCOUNTER (OUTPATIENT)
Dept: OCCUPATIONAL THERAPY | Facility: CLINIC | Age: 43
Setting detail: THERAPIES SERIES
End: 2018-09-28
Attending: PHYSICIAN ASSISTANT
Payer: COMMERCIAL

## 2018-09-28 PROCEDURE — 97537 COMMUNITY/WORK REINTEGRATION: CPT | Mod: GO | Performed by: OCCUPATIONAL THERAPIST

## 2018-09-28 PROCEDURE — 97535 SELF CARE MNGMENT TRAINING: CPT | Mod: GO | Performed by: OCCUPATIONAL THERAPIST

## 2018-09-28 PROCEDURE — 40000125 ZZHC STATISTIC OT OUTPT VISIT: Performed by: OCCUPATIONAL THERAPIST

## 2018-10-02 ENCOUNTER — HOSPITAL ENCOUNTER (OUTPATIENT)
Dept: SPEECH THERAPY | Facility: CLINIC | Age: 43
Setting detail: THERAPIES SERIES
End: 2018-10-02
Attending: FAMILY MEDICINE
Payer: COMMERCIAL

## 2018-10-02 PROCEDURE — 40000211 ZZHC STATISTIC SLP  DEPARTMENT VISIT: Performed by: SPEECH-LANGUAGE PATHOLOGIST

## 2018-10-02 PROCEDURE — 92507 TX SP LANG VOICE COMM INDIV: CPT | Mod: GN | Performed by: SPEECH-LANGUAGE PATHOLOGIST

## 2018-10-03 ENCOUNTER — HOSPITAL ENCOUNTER (OUTPATIENT)
Dept: OCCUPATIONAL THERAPY | Facility: CLINIC | Age: 43
Setting detail: THERAPIES SERIES
End: 2018-10-03
Attending: PHYSICIAN ASSISTANT
Payer: COMMERCIAL

## 2018-10-03 PROCEDURE — 97537 COMMUNITY/WORK REINTEGRATION: CPT | Mod: GO | Performed by: OCCUPATIONAL THERAPIST

## 2018-10-03 PROCEDURE — 40000125 ZZHC STATISTIC OT OUTPT VISIT: Performed by: OCCUPATIONAL THERAPIST

## 2018-10-05 ENCOUNTER — TELEPHONE (OUTPATIENT)
Dept: FAMILY MEDICINE | Facility: CLINIC | Age: 43
End: 2018-10-05

## 2018-10-05 NOTE — TELEPHONE ENCOUNTER
Received form(s) from [employer] disability and leave.  Placed form(s) in/on CW's box.  Forms need to be filled out and signed and faxed to number listed on form.    Call pt to verify form was sent: Yes  Copy needs to be sent for scanning after completion: Yes

## 2018-10-05 NOTE — TELEPHONE ENCOUNTER
Need TE appt or office appt to be able to complete this form- many details to discuss in depth.  Will keep forms in manilla envelope on my desk.  Wife's # on envelope (Ronit)- said to call with questions- [phone].  Please call to see what they would prefer...  Thanks_ CW

## 2018-10-08 ENCOUNTER — HOSPITAL ENCOUNTER (OUTPATIENT)
Dept: SPEECH THERAPY | Facility: CLINIC | Age: 43
Setting detail: THERAPIES SERIES
End: 2018-10-08
Attending: FAMILY MEDICINE
Payer: COMMERCIAL

## 2018-10-08 PROCEDURE — 40000211 ZZHC STATISTIC SLP  DEPARTMENT VISIT: Performed by: STUDENT IN AN ORGANIZED HEALTH CARE EDUCATION/TRAINING PROGRAM

## 2018-10-08 PROCEDURE — 92507 TX SP LANG VOICE COMM INDIV: CPT | Mod: GN | Performed by: STUDENT IN AN ORGANIZED HEALTH CARE EDUCATION/TRAINING PROGRAM

## 2018-10-10 ENCOUNTER — HOSPITAL ENCOUNTER (OUTPATIENT)
Dept: OCCUPATIONAL THERAPY | Facility: CLINIC | Age: 43
Setting detail: THERAPIES SERIES
End: 2018-10-10
Attending: PHYSICIAN ASSISTANT
Payer: COMMERCIAL

## 2018-10-10 PROCEDURE — 97110 THERAPEUTIC EXERCISES: CPT | Mod: GO | Performed by: OCCUPATIONAL THERAPIST

## 2018-10-10 PROCEDURE — 97112 NEUROMUSCULAR REEDUCATION: CPT | Mod: GO | Performed by: OCCUPATIONAL THERAPIST

## 2018-10-10 PROCEDURE — 40000125 ZZHC STATISTIC OT OUTPT VISIT: Performed by: OCCUPATIONAL THERAPIST

## 2018-10-10 PROCEDURE — 97535 SELF CARE MNGMENT TRAINING: CPT | Mod: GO | Performed by: OCCUPATIONAL THERAPIST

## 2018-10-11 ENCOUNTER — HOSPITAL ENCOUNTER (OUTPATIENT)
Dept: SPEECH THERAPY | Facility: CLINIC | Age: 43
Setting detail: THERAPIES SERIES
End: 2018-10-11
Attending: FAMILY MEDICINE
Payer: COMMERCIAL

## 2018-10-11 PROCEDURE — 92507 TX SP LANG VOICE COMM INDIV: CPT | Mod: GN | Performed by: SPEECH-LANGUAGE PATHOLOGIST

## 2018-10-11 PROCEDURE — 40000211 ZZHC STATISTIC SLP  DEPARTMENT VISIT: Performed by: SPEECH-LANGUAGE PATHOLOGIST

## 2018-10-12 ENCOUNTER — HOSPITAL ENCOUNTER (OUTPATIENT)
Dept: OCCUPATIONAL THERAPY | Facility: CLINIC | Age: 43
Setting detail: THERAPIES SERIES
End: 2018-10-12
Attending: PHYSICIAN ASSISTANT
Payer: COMMERCIAL

## 2018-10-12 ENCOUNTER — OFFICE VISIT (OUTPATIENT)
Dept: FAMILY MEDICINE | Facility: CLINIC | Age: 43
End: 2018-10-12
Payer: COMMERCIAL

## 2018-10-12 ENCOUNTER — TELEPHONE (OUTPATIENT)
Dept: NEUROLOGY | Facility: CLINIC | Age: 43
End: 2018-10-12

## 2018-10-12 VITALS
TEMPERATURE: 98 F | HEIGHT: 73 IN | DIASTOLIC BLOOD PRESSURE: 82 MMHG | WEIGHT: 245.5 LBS | BODY MASS INDEX: 32.54 KG/M2 | OXYGEN SATURATION: 99 % | SYSTOLIC BLOOD PRESSURE: 128 MMHG | HEART RATE: 78 BPM

## 2018-10-12 DIAGNOSIS — Z86.73 HISTORY OF TIA (TRANSIENT ISCHEMIC ATTACK) AND STROKE: ICD-10-CM

## 2018-10-12 DIAGNOSIS — F32.1 MODERATE MAJOR DEPRESSION (H): ICD-10-CM

## 2018-10-12 DIAGNOSIS — R53.83 FATIGUE, UNSPECIFIED TYPE: ICD-10-CM

## 2018-10-12 DIAGNOSIS — I77.74 VERTEBRAL ARTERY DISSECTION (H): Primary | ICD-10-CM

## 2018-10-12 PROCEDURE — 97535 SELF CARE MNGMENT TRAINING: CPT | Mod: GO | Performed by: OCCUPATIONAL THERAPIST

## 2018-10-12 PROCEDURE — 99214 OFFICE O/P EST MOD 30 MIN: CPT | Performed by: FAMILY MEDICINE

## 2018-10-12 PROCEDURE — 40000125 ZZHC STATISTIC OT OUTPT VISIT: Performed by: OCCUPATIONAL THERAPIST

## 2018-10-12 PROCEDURE — 97110 THERAPEUTIC EXERCISES: CPT | Mod: GO | Performed by: OCCUPATIONAL THERAPIST

## 2018-10-12 PROCEDURE — 97112 NEUROMUSCULAR REEDUCATION: CPT | Mod: GO | Performed by: OCCUPATIONAL THERAPIST

## 2018-10-12 NOTE — MR AVS SNAPSHOT
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  If doing well, will plan to go to full-time work hours two weeks later.  If not doing well, return to clinic to come up with new plan.          Follow-ups after your visit        Your next 10 appointments already scheduled     Oct 15, 2018  9:00 AM CDT   Neuro Treatment with Allison E Alpers, SLP   [facility] ([facility])    [address] [place] [address] [address]  [facility] [address]   [phone]            Oct 19, 2018  9:00 AM CDT   Neuro Treatment with Silva Rey, TOMA   [facility] ([facility])    [address] [place] [address] [address]  [facility] [address]   [phone]            Oct 19, 2018  9:45 AM CDT   Neuro Treatment with Meseret Ashraf, CHIP   [facility] ([facility])    [address] [place] [address] [address]  [facility] [address]   [phone]            Oct 22, 2018  9:45 AM CDT   Neuro Treatment with Allison E Alpers, SLP   [facility] ([facility])    [address] [place] [address] [address]  [facility] [address]   [phone]            Oct 25, 2018  8:45 AM CDT   Neuro Treatment with Allison E Alpers, SLP   [facility] ([facility])    [address] [place] [address] [address]  [facility] [address]   [phone]            Oct 26, 2018  3:00 PM CDT   Neuro Treatment with Meseret Ashraf, OT   [facility] ([facility])    [address]  [address]  [address]  [place] [address]   [phone]            Nov 02, 2018 11:15 AM CDT   Neuro Treatment with Meseret Ashraf, OT   [facility] ([facility])    [address] [place] [address]  [address]  [place] [address]   [phone]            Nov 09, 2018 11:15 AM CST   Neuro Treatment with Meseret Ashraf, OT   [facility] ([facility])    [address] [place] [address] [address]  [place] [address]   [phone]            Dec 06, 2018 11:30 AM CST   CTA  HEAD NECK WITH CONTRAST with SHCT1   [facility] CT ([facility])    [facility] [address]   [phone]           How do I prepare for my exam? (Food and drink instructions) **You will have contrast for this exam.** Do not eat or drink for 2 hours before your exam. If you need to take medicine, you may take it with small sips of water. (We may ask you to take liquid medicine as well.)  The day before your exam, drink extra fluids at least six 8-ounce glasses (unless your doctor tells you to restrict your fluids).  How do I prepare for my exam? (Other instructions) Patients over 70 or patients with diabetes or kidney problems: If you haven t had a blood test (creatinine test) within the last 30 days, the Cardiologist/Radiologist may require you to get this test prior to your exam.  What should I wear: Please wear loose clothing, such as a sweat suit or jogging clothes.  Avoid snaps, zippers and other metal. We may ask you to undress and put on a hospital gown.  How long does the exam take: Most scans take less than 20 minutes.  What should I bring: Please bring any scans or X-rays taken at other hospitals, if similar tests were done. Also bring a list of your medicines, including vitamins, minerals and over-the-counter drugs. It is safest to leave personal items at home.  Do I need a :  No  is needed.  What do I need to tell my doctor? Be sure  to tell your doctor: * If you have any allergies. * If there s any chance you are pregnant. * If you are breastfeeding. * If you have diabetes as your medication may need to be adjusted for this exam.  What should I do after the exam: No restrictions, You may resume normal activities.  What is this test: A CT (computed tomography) scan is a series of pictures that allows us to look inside your body. The scanner creates images of the body in cross sections, much like slices of bread. This helps us see any problems more clearly. You may receive contrast (X-ray dye) before or during your scan. Contrast is given through an IV (small needle in your arm).  Who should I call with questions: If you have any questions, please call the Imaging Department where you will have your exam. Directions, parking instructions, and other information is available on our website, [email or web address].              Who to contact     If you have questions or need follow up information about today's clinic visit or your schedule please contact [facility] directly at [phone].  Normal or non-critical lab and imaging results will be communicated to you by Qbakahart, letter or phone within 4 business days after the clinic has received the results. If you do not hear from us within 7 days, please contact the clinic through [facility] or phone. If you have a critical or abnormal lab result, we will notify you by phone as soon as possible.  Submit refill requests through [facility] or call your pharmacy and they will forward the refill request to us. Please allow 3 business days for your refill to be completed.          Additional Information About Your Visit        S[facility]ur Information     [facility] gives you secure access to your electronic health record. If you see a primary care provider, you can also send messages to your care team and make appointments. If you have questions, please call your primary care clinic.  If you do not have a  "primary care provider, please call [phone] and they will assist you.        Care EveryWhere ID     This is your Care EveryWhere ID. This could be used by other organizations to access your [place] medical records  [id number]        Your Vitals Were     Pulse Temperature Height Pulse Oximetry BMI (Body Mass Index)       78 98  F (36.7  C) (Oral) 6' 0.5\" (1.842 m) 99% 32.84 kg/m2        Blood Pressure from Last 3 Encounters:   10/12/18 128/82   09/14/18 122/77   09/06/18 124/70    Weight from Last 3 Encounters:   10/12/18 245 lb 8 oz (111.4 kg)   09/14/18 243 lb 4.8 oz (110.4 kg)   06/18/18 245 lb 3.2 oz (111.2 kg)              Today, you had the following     No orders found for display       Primary Care Provider Office Phone # Fax #    Neida Benz [phone] [address] [address]        Equal Access to Services     [facility]: Hadii aad ku hadasho Soomaali, waaxda luqadaha, qaybta kaalmada adeeduardoyalencho, snehal birch . So [facility] [phone].    ATENCIÓN: Si habla español, tiene a alvarado disposición servicios gratuitos de asistencia lingüística. [facility] [phone].    We comply with applicable federal civil rights laws and [place] laws. We do not discriminate on the basis of race, color, national origin, age, disability, sex, sexual orientation, or gender identity.            Thank you!     Thank you for choosing [facility]  for your care. Our goal is always to provide you with excellent care. Hearing back from our patients is one way we can continue to improve our services. Please take a few minutes to complete the written survey that you may receive in the mail after your visit with us. Thank you!             Your Updated Medication List - Protect others around you: Learn how to safely use, store and throw away your medicines at www.disposemymeds.org.          This list is accurate as of 10/12/18 12:44 PM.  " Always use your most recent med list.                   Brand Name Dispense Instructions for use Diagnosis    acetaminophen 325 MG tablet    TYLENOL    100 tablet    Take 2 tablets (650 mg) by mouth every 4 hours as needed for mild pain    Other headache syndrome       aspirin 325 MG EC tablet     60 tablet    Take 1 tablet (325 mg) by mouth daily    Cerebrovascular accident (CVA) due to other mechanism       bisacodyl 10 MG Suppository    DULCOLAX    5 suppository    Place 1 suppository (10 mg) rectally daily as needed for constipation    Constipation, unspecified constipation type       buPROPion 150 MG 24 hr tablet    WELLBUTRIN XL    90 tablet    TAKE 1 TABLET (150 MG) BY MOUTH EVERY MORNING - NEEDS APPOINTMENT    Major depressive disorder, recurrent episode, moderate (H)       ORDER FOR ALLERGEN IMMUNOTHERAPY 5 mL vial           order for DME      AIRSENSE 10 7-12 CM/H20 NASAL WISP        senna-docusate 8.6-50 MG per tablet    SENOKOT-S;PERICOLACE    100 tablet    Take 1-2 tablets by mouth 2 times daily as needed for constipation    Constipation, unspecified constipation type       sertraline 100 MG tablet    ZOLOFT    90 tablet    Take 1 tablet (100 mg) by mouth daily    Major depressive disorder, recurrent episode, moderate (H)

## 2018-10-12 NOTE — PATIENT INSTRUCTIONS
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  If doing well, will plan to go to full-time work hours two weeks later.  If not doing well, return to clinic to come up with new plan.

## 2018-10-12 NOTE — TELEPHONE ENCOUNTER
[facility] Call Center    Phone Message    May a detailed message be left on voicemail: yes    Reason for Call: Other: Dr. Benz calling very confused about why pt should follow up with [facility] as opposed to following up here after his discharge on 9/6. Relayed the message that that direction came from Dr. Fallon and she wants to know why he said that. Please call her back at [phone] to discuss.    Action Taken: Message routed to:  [facility] ([facility]):  NEUROLOGY

## 2018-10-12 NOTE — PROGRESS NOTES
SUBJECTIVE:   Vahe Haas is a 43 year old male who presents to clinic today for the following health issues: Forms - disability/leave    Pt seen for f/u s/p vertebral artery dissection, with subsequent CVA (Left PCA and Rt PICA) in 9/18.  He was hospitalized for a few days, and now has been recovering at home with multiple therapy visits.    He has been seeing speech therapy and OT primarily.  Pt's wife notes that OT has been noticing a lot of improvements in general.  Pt was able to get his driving license back last week due to these improvements.    He has been driving, first with his wife to make sure he was safe, and he's been mostly doing okay. Pt is aware that he needs to really focus when he's driving, because his R side vison isn't as strong as his left.  Needs to concentrate to not veer to the R slightly.    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      Pt and his wife also note that he has been doing a lot of resting on the couch at home, and still gets fatigued from doing more activities/errands.      Things have been going overall- continued issues...  --R side issues- less coordinated.  Still with some R peripheral vision loss.  --Speech is improved, sometimes still has some word finding issues, but better.  --Still struggling with motivating himself to do much at home- has a hard time getting up off the couch, and stopping watching tv.  OT has recommended that he do some typing, guitar, and legos to work on fine motor movement and other skills, but he's had a hard time motivating to do those.  --He's still been having fatigue.  He is getting out to walk the dog about half the time.  Told he should  Do legos, typing more, guitar.  Most of the time, watching tv.  Should get outside more.      Problem list and histories reviewed & adjusted, as indicated.  Additional history: as  documented      Patient Active Problem List   Diagnosis     Allergic rhinitis     Insomnia     CARDIOVASCULAR SCREENING; LDL GOAL LESS THAN 160     Moderate major depression     Drug-induced erectile dysfunction     JANA (obstructive sleep apnea)     Acute bilateral thoracic back pain     Acute bilateral low back pain without sciatica     Vertebral artery dissection (H)     Past Surgical History:   Procedure Laterality Date     HC TOOTH EXTRACTION W/FORCEP  2003       Social History   Substance Use Topics     Smoking status: Never Smoker     Smokeless tobacco: Never Used     Alcohol use 0.0 oz/week     0 Standard drinks or equivalent per week      Comment: social, 0-2 times per week     Family History   Problem Relation Age of Onset     Hypertension Mother      Hypertension Maternal Grandmother      Hypertension Paternal Grandfather      Cerebrovascular Disease Maternal Grandmother      Breast Cancer Paternal Grandmother      Allergies Mother      Allergies Father      Allergies Sister      Allergies Maternal Grandmother      Arthritis Maternal Grandmother      Depression Paternal Grandfather      Depression Mother      GASTROINTESTINAL DISEASE Paternal Grandfather      ulcers     Lipids Mother      Lipids Father      Lipids Maternal Grandmother      Lipids Paternal Grandfather      Musculoskeletal Disorder Mother      Osteoporosis Mother      Osteoporosis Maternal Grandmother      Respiratory Mother      asthma     Respiratory Father      asthma     Respiratory Sister      asthma     Thyroid Disease Sister          Current Outpatient Prescriptions   Medication Sig Dispense Refill     acetaminophen (TYLENOL) 325 MG tablet Take 2 tablets (650 mg) by mouth every 4 hours as needed for mild pain 100 tablet 0     ALLERGY SHOTS        aspirin 325 MG EC tablet Take 1 tablet (325 mg) by mouth daily 60 tablet 0     bisacodyl (DULCOLAX) 10 MG Suppository Place 1 suppository (10 mg) rectally daily as needed for constipation 5  "suppository 1     buPROPion (WELLBUTRIN XL) 150 MG 24 hr tablet TAKE 1 TABLET (150 MG) BY MOUTH EVERY MORNING - NEEDS APPOINTMENT 90 tablet 1     order for DME AIRSENSE 10  7-12 CM/H20  NASAL WISP       senna-docusate (SENOKOT-S;PERICOLACE) 8.6-50 MG per tablet Take 1-2 tablets by mouth 2 times daily as needed for constipation 100 tablet 0     sertraline (ZOLOFT) 100 MG tablet Take 1 tablet (100 mg) by mouth daily 90 tablet 1     Allergies   Allergen Reactions     Seasonal Allergies      Recent Labs   Lab Test  09/04/18   1040  09/03/18   0934  02/17/16   1202  02/13/13   1116   12/23/10   1442   A1C   --   5.6   --    --    --    --    LDL  109*   --   124*  122   < >   --    HDL  37*   --   30*  29*   < >   --    TRIG  99   --   167*  98   < >   --    ALT   --   29   --    --    --    --    CR  1.01  1.04  1.02  1.03   < >   --    GFRESTIMATED  81  78  81  82   < >   --    GFRESTBLACK  >90  >90  >90  African American GFR Calc    >90   < >   --    POTASSIUM  4.0  3.5  3.9  3.9   < >   --    TSH   --    --    --    --    --   1.19    < > = values in this interval not displayed.      BP Readings from Last 3 Encounters:   10/12/18 128/82   09/14/18 122/77   09/06/18 124/70    Wt Readings from Last 3 Encounters:   10/12/18 245 lb 8 oz (111.4 kg)   09/14/18 243 lb 4.8 oz (110.4 kg)   06/18/18 245 lb 3.2 oz (111.2 kg)            Labs reviewed in EPIC    Reviewed and updated as needed this visit by clinical staff  Tobacco  Allergies  Meds  Problems       Reviewed and updated as needed this visit by Provider  Allergies  Meds  Problems         ROS:  Constitutional, HEENT, cardiovascular, pulmonary, gi and gu systems are negative, except as otherwise noted.    OBJECTIVE:     /82  Pulse 78  Temp 98  F (36.7  C) (Oral)  Ht 6' 0.5\" (1.842 m)  Wt 245 lb 8 oz (111.4 kg)  SpO2 99%  BMI 32.84 kg/m2  Body mass index is 32.84 kg/(m^2).  GENERAL APPEARANCE: healthy, alert and no distress  EYES: Eyes grossly normal " to inspection, PERRL and conjunctivae and sclerae normal  HENT: ear canals and TM's normal and nose and mouth without ulcers or lesions  NECK: no adenopathy, no asymmetry, masses, or scars and thyroid normal to palpation  RESP: lungs clear to auscultation - no rales, rhonchi or wheezes  CV: regular rates and rhythm, normal S1 S2, no S3 or S4 and no murmur, click or rub  ABDOMEN: soft, nontender, without hepatosplenomegaly or masses and bowel sounds normal  MS: extremities normal- no gross deformities noted  SKIN: no suspicious lesions or rashes  NEURO: Normal strength and tone, mentation intact, speech normal, DTR symmetrically normal in lower extremities, gait normal including heel/toe/tandem walking, cranial nerves 2-12 intact, Romberg negative, rapid alternating movements normal and light touch and pinprick normal  Psych: full range affect, normal speech and grooming, judgement and insight intact     Diagnostic Test Results:  none     ASSESSMENT/PLAN:       ICD-10-CM    1. Vertebral artery dissection (H) I77.74    2. History of TIA (transient ischemic attack) and stroke Z86.73    3. Fatigue, unspecified type R53.83    4. Moderate major depression F32.1      Vertebral artery dissection and CVA - recovering from CVA early last month.  Speech improving, motor skills improving with OT and speech thearpy.  Still having trouble with some vision loss, mild speech issues, some concentration issues.  Motivation and fatigue are still significant, but improving slightly.      Dicussed having pt return to work for 4 hour maximum days starting 9/25/18 (a Thursday so he starts with just a couple days prior to a weekend).  If he does well with 4 hour days for two weeks, he can increase time to full-time without restrictions.  If he is not tolerating 4 hour days okay, or if he is concerned about increasing to full-time work, he should return to discuss further and adjust recommendations/paperwork prior to scheduled return to  full-time work.    Pt and wife agree with plan and had no further questions.    MDD- pt and wife feel his mood has been fine, though we will continue to watch closely as he has h/o MDD, and his fatigue/motivation could be due to the recent CVA, but could also be from depression.      Neida Benz MD  [facility]

## 2018-10-15 ENCOUNTER — HOSPITAL ENCOUNTER (OUTPATIENT)
Dept: SPEECH THERAPY | Facility: CLINIC | Age: 43
Setting detail: THERAPIES SERIES
End: 2018-10-15
Attending: FAMILY MEDICINE
Payer: COMMERCIAL

## 2018-10-15 PROCEDURE — 40000211 ZZHC STATISTIC SLP  DEPARTMENT VISIT: Performed by: STUDENT IN AN ORGANIZED HEALTH CARE EDUCATION/TRAINING PROGRAM

## 2018-10-15 PROCEDURE — 92507 TX SP LANG VOICE COMM INDIV: CPT | Mod: GN | Performed by: STUDENT IN AN ORGANIZED HEALTH CARE EDUCATION/TRAINING PROGRAM

## 2018-10-15 NOTE — TELEPHONE ENCOUNTER
LVMM informing Dr. Ferguson that patient was referred to [facility] as our facility is unable to see patient due to overcapacity. Left direct number for call back.

## 2018-10-17 ENCOUNTER — TELEPHONE (OUTPATIENT)
Dept: FAMILY MEDICINE | Facility: CLINIC | Age: 43
End: 2018-10-17

## 2018-10-17 NOTE — TELEPHONE ENCOUNTER
Reason for Call:  Other     Detailed comments: Maira mcmahan [facility] calling for office visit note to complete short term disability forms fax to: [phone]    Phone Number Patient can be reached at: call maira: [phone]     Best Time: any    Can we leave a detailed message on this number? YES    Call taken on 10/17/2018 at 8:54 AM by Awilda Antonio

## 2018-10-17 NOTE — TELEPHONE ENCOUNTER
Received form(s) from [employer]. for Medical Accomodation.  Placed form(s) in/on CW's box.  Forms need to be filled out and signed and faxed to number listed on form..    Call pt to verify form was sent: No  Copy needs to be sent for scanning after completion: Yes    MIRELLA Freire

## 2018-10-18 NOTE — TELEPHONE ENCOUNTER
Maira is calling for the office visit notes.  It is a paid benefit, and for that reason she needs the office visit notes.  Please fax them to Maira: [phone].  Please fax office visit notes

## 2018-10-18 NOTE — TELEPHONE ENCOUNTER
CW, last OV note needed, please advise when complete and team can fax to number listed below      Kemi Chavez CMA

## 2018-10-19 ENCOUNTER — HOSPITAL ENCOUNTER (OUTPATIENT)
Dept: OCCUPATIONAL THERAPY | Facility: CLINIC | Age: 43
Setting detail: THERAPIES SERIES
End: 2018-10-19
Attending: PHYSICIAN ASSISTANT
Payer: COMMERCIAL

## 2018-10-19 ENCOUNTER — HOSPITAL ENCOUNTER (OUTPATIENT)
Dept: SPEECH THERAPY | Facility: CLINIC | Age: 43
Setting detail: THERAPIES SERIES
End: 2018-10-19
Attending: FAMILY MEDICINE
Payer: COMMERCIAL

## 2018-10-19 PROCEDURE — 40000125 ZZHC STATISTIC OT OUTPT VISIT: Performed by: OCCUPATIONAL THERAPIST

## 2018-10-19 PROCEDURE — 92507 TX SP LANG VOICE COMM INDIV: CPT | Mod: GN | Performed by: SPEECH-LANGUAGE PATHOLOGIST

## 2018-10-19 PROCEDURE — 40000211 ZZHC STATISTIC SLP  DEPARTMENT VISIT: Performed by: SPEECH-LANGUAGE PATHOLOGIST

## 2018-10-19 PROCEDURE — 97112 NEUROMUSCULAR REEDUCATION: CPT | Mod: GO | Performed by: OCCUPATIONAL THERAPIST

## 2018-10-19 PROCEDURE — 97537 COMMUNITY/WORK REINTEGRATION: CPT | Mod: GO | Performed by: OCCUPATIONAL THERAPIST

## 2018-10-22 ENCOUNTER — HOSPITAL ENCOUNTER (OUTPATIENT)
Dept: SPEECH THERAPY | Facility: CLINIC | Age: 43
Setting detail: THERAPIES SERIES
End: 2018-10-22
Attending: FAMILY MEDICINE
Payer: COMMERCIAL

## 2018-10-22 PROCEDURE — 40000211 ZZHC STATISTIC SLP  DEPARTMENT VISIT: Performed by: STUDENT IN AN ORGANIZED HEALTH CARE EDUCATION/TRAINING PROGRAM

## 2018-10-22 PROCEDURE — 92507 TX SP LANG VOICE COMM INDIV: CPT | Mod: GN | Performed by: STUDENT IN AN ORGANIZED HEALTH CARE EDUCATION/TRAINING PROGRAM

## 2018-10-23 ENCOUNTER — HOSPITAL ENCOUNTER (OUTPATIENT)
Dept: OCCUPATIONAL THERAPY | Facility: CLINIC | Age: 43
Setting detail: THERAPIES SERIES
End: 2018-10-23
Attending: PHYSICIAN ASSISTANT
Payer: COMMERCIAL

## 2018-10-23 PROCEDURE — 97535 SELF CARE MNGMENT TRAINING: CPT | Mod: GO | Performed by: OCCUPATIONAL THERAPIST

## 2018-10-23 PROCEDURE — 40000125 ZZHC STATISTIC OT OUTPT VISIT: Performed by: OCCUPATIONAL THERAPIST

## 2018-10-23 PROCEDURE — 97110 THERAPEUTIC EXERCISES: CPT | Mod: GO | Performed by: OCCUPATIONAL THERAPIST

## 2018-10-25 ENCOUNTER — HOSPITAL ENCOUNTER (OUTPATIENT)
Dept: SPEECH THERAPY | Facility: CLINIC | Age: 43
Setting detail: THERAPIES SERIES
End: 2018-10-25
Attending: FAMILY MEDICINE
Payer: COMMERCIAL

## 2018-10-25 PROCEDURE — 40000211 ZZHC STATISTIC SLP  DEPARTMENT VISIT: Performed by: STUDENT IN AN ORGANIZED HEALTH CARE EDUCATION/TRAINING PROGRAM

## 2018-10-25 PROCEDURE — 92507 TX SP LANG VOICE COMM INDIV: CPT | Mod: GN | Performed by: STUDENT IN AN ORGANIZED HEALTH CARE EDUCATION/TRAINING PROGRAM

## 2018-10-25 NOTE — PROGRESS NOTES
Outpatient Speech Language Pathology Progress Note     Patient: Vahe Haas  : 1975    Beginning/End Dates of Reporting Period:  2018 to 10/25/2018; 10 therapy sessions since evaluation    Referring Provider: Deb Christiansen PA-C    Therapy Diagnosis: Mild expressive aphasia, mild dysarthria    Client Self Report: Patient arrived 5 minutes late with completed HEP.  He is returning to work on a part-time basis today.     Objective Measurements:      Pt participated in informal retesting of all goal areas with results as follows: RC: Pt answered questions about a written paragraph with 100% accy given min support from SLP and extra time.  Speech: Apraxia Battery: Increasing Word Length: 10/10.  Words Repetition: 9/10 improving to 10/10 given 1 additional repetition.  Sentences: 5/5.  VE: Generative Naming Subtest of CLQT: 5 (Mean: 6.57; SD: 1.49).  Given 3 clues, pt verbalized the target word with 96% accy independently, improving to 100% accy given min support from SLP.  WE: Narrative Writing (BDAE 3):  with deficits in writing mechanics and syntax (1 run on sentence).          Goals:  Goal Identifier Speech   Goal Description Patient will learn, implement, and demonstrate use of speech intelligibility strategies to increase intelligibility at the extended reading and spontaneous conversation level to >95% accy given min cues to meet daily communication needs.   Target Date 18   Date Met  10/25/18   Progress:  Good, goal met.  Speech is 100% intelligible in conversation.     Goal Identifier Verbal expression   Goal Description Patient will learn, implement, and demonstrate use of 3 word-finding strategies with 90% accuracy during complex word finding tasks given min cues in order to support verbal expression for everyday language tasks at work and at home.   Target Date 18   Date Met      Progress:  Progressing, goal not met per objective measures above.  Pt continuing to benefit from  extra processing time for complex verbal expression tasks.     Goal Identifier Written expression   Goal Description Patient will complete a complex writing task (e.g. write or type an email 2-3 paragraphs in length) with 90% accy for spelling and grammar when given min assist to meet daily writing needs.   Target Date 12/19/18   Date Met      Progress:  Progressing, goal not met per objective measures above.     Goal Identifier Reading comprehension   Goal Description Patient will complete a complex reading task (e.g. read a 1-2 paragraph length story and answer questions) with 90% accy when given min assist to meet daily functional reading needs.   Target Date 12/19/18   Date Met      Progress:  Progressing.  Pt is currently meeting accy criterion, but continues to require longer than normal processing time to achieve this goal.       Progress Toward Goals:    Progress this reporting period: Good, speech intelligibility goal met, other goals progressing.  Patient has demonstrated consistent improvement across all goal areas with the techniques and exercises trained in therapy.  He is currently demonstrating no-minimal dysarthria and is 100% intelligible in conversation.  While he is consistently accurate in complex verbal and written expression and reading comprehension tasks, he continues to require extra processing time and support from SLP, and it not yet at his pre-CVA baseline.  Patient continues to benefit from skilled SLP services targeting verbal expression, written expression, and reading comprehension.    Plan:  Continue therapy per current plan of care. Frequency: 1x/week for 4 weeks.    Discharge:  No      Allison Alpers, B.A. ([place]), M.A., CCC-SLP  Speech-Language Pathologist  Certificate of Vocology  [facility]  [phone]

## 2018-10-26 ENCOUNTER — HOSPITAL ENCOUNTER (OUTPATIENT)
Dept: OCCUPATIONAL THERAPY | Facility: CLINIC | Age: 43
Setting detail: THERAPIES SERIES
End: 2018-10-26
Attending: PHYSICIAN ASSISTANT
Payer: COMMERCIAL

## 2018-10-26 PROCEDURE — 40000125 ZZHC STATISTIC OT OUTPT VISIT: Performed by: OCCUPATIONAL THERAPIST

## 2018-10-26 PROCEDURE — 97537 COMMUNITY/WORK REINTEGRATION: CPT | Mod: GO | Performed by: OCCUPATIONAL THERAPIST

## 2018-10-26 PROCEDURE — 97110 THERAPEUTIC EXERCISES: CPT | Mod: GO | Performed by: OCCUPATIONAL THERAPIST

## 2018-10-31 ENCOUNTER — HOSPITAL ENCOUNTER (OUTPATIENT)
Dept: SPEECH THERAPY | Facility: CLINIC | Age: 43
Setting detail: THERAPIES SERIES
End: 2018-10-31
Attending: PHYSICIAN ASSISTANT
Payer: COMMERCIAL

## 2018-10-31 PROCEDURE — 40000211 ZZHC STATISTIC SLP  DEPARTMENT VISIT: Performed by: STUDENT IN AN ORGANIZED HEALTH CARE EDUCATION/TRAINING PROGRAM

## 2018-10-31 PROCEDURE — 92507 TX SP LANG VOICE COMM INDIV: CPT | Mod: GN | Performed by: STUDENT IN AN ORGANIZED HEALTH CARE EDUCATION/TRAINING PROGRAM

## 2018-11-02 ENCOUNTER — HOSPITAL ENCOUNTER (OUTPATIENT)
Dept: OCCUPATIONAL THERAPY | Facility: CLINIC | Age: 43
Setting detail: THERAPIES SERIES
End: 2018-11-02
Attending: PHYSICIAN ASSISTANT
Payer: COMMERCIAL

## 2018-11-02 PROCEDURE — 97537 COMMUNITY/WORK REINTEGRATION: CPT | Mod: GO | Performed by: OCCUPATIONAL THERAPIST

## 2018-11-02 PROCEDURE — 40000125 ZZHC STATISTIC OT OUTPT VISIT: Performed by: OCCUPATIONAL THERAPIST

## 2018-11-04 DIAGNOSIS — F33.1 MAJOR DEPRESSIVE DISORDER, RECURRENT EPISODE, MODERATE (H): ICD-10-CM

## 2018-11-05 RX ORDER — SERTRALINE HYDROCHLORIDE 100 MG/1
100 TABLET, FILM COATED ORAL DAILY
Qty: 90 TABLET | Refills: 0 | Status: SHIPPED | OUTPATIENT
Start: 2018-11-05 | End: 2018-12-19

## 2018-11-05 NOTE — TELEPHONE ENCOUNTER
Rx sent for #90 days.  He has not had a break in therapy- likely got some filled when he was in the hospital for vertebral artery dissection.  He is due for a physical after 12/18, so sent in #90, and will try and get him back for a physical around time of next refill if he doesn't come in for one sooner.  CW

## 2018-11-05 NOTE — TELEPHONE ENCOUNTER
Routing refill request to provider for review/approval because:  A break in medication, last refill would have been completed 6/11/18  Please authorize if appropriate.  Thanks,  Perri Stevens RN

## 2018-11-08 ENCOUNTER — HOSPITAL ENCOUNTER (OUTPATIENT)
Dept: SPEECH THERAPY | Facility: CLINIC | Age: 43
Setting detail: THERAPIES SERIES
End: 2018-11-08
Attending: PHYSICIAN ASSISTANT
Payer: COMMERCIAL

## 2018-11-08 PROCEDURE — 40000211 ZZHC STATISTIC SLP  DEPARTMENT VISIT: Performed by: STUDENT IN AN ORGANIZED HEALTH CARE EDUCATION/TRAINING PROGRAM

## 2018-11-08 PROCEDURE — 92507 TX SP LANG VOICE COMM INDIV: CPT | Mod: GN | Performed by: STUDENT IN AN ORGANIZED HEALTH CARE EDUCATION/TRAINING PROGRAM

## 2018-11-09 ENCOUNTER — HOSPITAL ENCOUNTER (OUTPATIENT)
Dept: OCCUPATIONAL THERAPY | Facility: CLINIC | Age: 43
Setting detail: THERAPIES SERIES
End: 2018-11-09
Attending: PHYSICIAN ASSISTANT
Payer: COMMERCIAL

## 2018-11-09 PROCEDURE — 40000125 ZZHC STATISTIC OT OUTPT VISIT: Performed by: OCCUPATIONAL THERAPIST

## 2018-11-09 PROCEDURE — 97535 SELF CARE MNGMENT TRAINING: CPT | Mod: GO | Performed by: OCCUPATIONAL THERAPIST

## 2018-11-09 NOTE — PROGRESS NOTES
Outpatient Occupational Therapy Discharge Note     Patient: Vahe Haas  : 1975    Beginning/End Dates of Reporting Period:  2018 to 2018    Referring Provider: Deb Christiansen PA-C    Therapy Diagnosis: Impaired ADL/IADL/work/community mobility    Client Self Report: Pt reports that fatigue level has improved since initial eval but is not quite at baseline yet.  Pt is continuing to work half days at work and will continue to progress to going back to work full time.  Pt has continued to report that he feels the most fatigued after work.  Pt has verbalized that he will implement fatigue management strategies in work and home tasks and use visual/auditory reminders to help adhere to these OT recommendations.      Objective Measurements:     Objective Measure: MOCA (alternate version)   Details: 23 (26+ considered normal range)     Objective Measure: Dynavision Mode A   Details: 225 (average 200+)     Objective Measure: Dynavision Mode B   Details: 40 and 56 (average is 42+)      Objective Measure: Dynavision   Details: 46 9/10# (average is 53+)     Objective Measure:  Strength   Details: R: 79#, L: 95#     Objective Measure: 9HPT   Details: R: 30 seconds, L: 20 seconds      Goals:     Goal Identifier 1 Memory compensation   Goal Description Pt will demonstrate Mod I with managing daily schedule, recall for medications and appointments using 1-2 memory strategies consistently 7/7 days per pt and wife report.     Target Date 18   Date Met  10/19/18   Progress: Goal met.      Goal Identifier 2 energy conservation   Goal Description Pt will identify 2-3 strategies to improve endurance and conserve energy and use 5/7 days consistently, per pt report.    Target Date 18   Date Met  18   Progress: Goal met.      Goal Identifier 3 Visual perceptual skills   Goal Description Pt will demonstrate use of 2 visual scanning strategies to improve safety with navigation and ambulation 5  trials in dynamic environments.     Target Date 12/06/18   Date Met  10/19/18   Progress: Goal met.      Goal Identifier 4 HEP   Goal Description Pt will complete coordination and strengthening HEP 5/7 days for 10-15 min with mod I to increase strength and coordination for ADL tasks including mowing lawn and typing and  measured through improved  strength and 9 hole peg test.   Target Date 12/06/18   Date Met  11/09/18   Progress: Goal met.      Goal Identifier 5 Medication management   Goal Description Pt will complete medication management task with 4+ medications and 100% accuracy and use of 2 strategies for organization.   Target Date 12/06/18   Date Met  10/23/18   Progress: Goal met.      Goal Identifier 6 problem solving   Goal Description Pt will demonstrate 90% accuracy on complex calendar management task with use of 1-2 strategies to improve organization with mod I.     Target Date 12/06/18   Date Met  10/19/18   Progress:     Goal Identifier 7 Community reintegration   Goal Description Pt will score within normal ranges in all areas assessed related to community reintegration, reaction time, attention and visual scanning with mod I to improve I with running errands and crossing street.   Target Date 12/06/18   Date Met  09/21/18   Progress: Goal met.        Progress Toward Goals:   Pt has met all goals. He was seen for 13 OT sessions for memory compensation, return to driving, problem solving, R hand coordination following his stroke.  Pt progressed from using a memory book to independently being able to remember daily tasks and appointments.  He continues to have complaints of R hand coordination and recommended to continue with home exercise program.     Plan:  Discharge from therapy.    Reason for Discharge: Patient has met all goals.    Equipment Issued: Hand gripper     Discharge Plan: Patient to continue home program of [facility] and to implement fatigue management and memory compensation strategies  for work and home tasks.

## 2018-11-14 ENCOUNTER — OFFICE VISIT (OUTPATIENT)
Dept: FAMILY MEDICINE | Facility: CLINIC | Age: 43
End: 2018-11-14
Payer: COMMERCIAL

## 2018-11-14 VITALS
DIASTOLIC BLOOD PRESSURE: 83 MMHG | OXYGEN SATURATION: 98 % | WEIGHT: 247 LBS | BODY MASS INDEX: 32.74 KG/M2 | SYSTOLIC BLOOD PRESSURE: 144 MMHG | TEMPERATURE: 97.4 F | HEIGHT: 73 IN | HEART RATE: 83 BPM | RESPIRATION RATE: 14 BRPM

## 2018-11-14 DIAGNOSIS — F32.1 MODERATE MAJOR DEPRESSION (H): ICD-10-CM

## 2018-11-14 DIAGNOSIS — R03.0 ELEVATED BP WITHOUT DIAGNOSIS OF HYPERTENSION: ICD-10-CM

## 2018-11-14 DIAGNOSIS — I77.74 VERTEBRAL ARTERY DISSECTION (H): Primary | ICD-10-CM

## 2018-11-14 PROCEDURE — 99214 OFFICE O/P EST MOD 30 MIN: CPT | Performed by: FAMILY MEDICINE

## 2018-11-14 NOTE — MR AVS SNAPSHOT
After Visit Summary   11/14/2018    Vahe Haas    MRN: [id number]           Patient Information     Date Of Birth          1975        Visit Information        Provider Department      11/14/2018 3:30 PM Neida Benz MD [facility]        Today's Diagnoses     Vertebral artery dissection (H)    -  1       Follow-ups after your visit        Follow-up notes from your care team     Return in about 3 months (around 2/14/2019) for Depression follow-up, work restrictions.      Your next 10 appointments already scheduled     Nov 15, 2018  8:15 AM CST   Neuro Treatment with Allison E Alpers, SLP   [facility] ([facility])    [address] [place] [address] [address]  [facility] [address]   [phone]            Nov 20, 2018  8:00 AM CST   Neuro Treatment with Allison E Alpers, SLP   [facility] ([facility])    [address] [place] [address] [address]  [facility] [address]   [phone]            Dec 06, 2018 11:30 AM CST   CTA  HEAD NECK WITH CONTRAST with SHCT1   [facility] CT ([facility])    [address] [place] [id number]   [phone]           How do I prepare for my exam? (Food and drink instructions) **You will have contrast for this exam.** Do not eat or drink for 2 hours before your exam. If you need to take medicine, you may take it with small sips of water. (We may ask you to take liquid medicine as well.)  The day before your exam, drink extra fluids at least six 8-ounce glasses (unless your doctor tells you to restrict your fluids).  How do I prepare for my exam? (Other instructions) Patients over 70 or patients with diabetes or kidney problems: If you haven t had a blood test (creatinine test) within the last 30 days, the Cardiologist/Radiologist may require you to get this test prior to your exam.  What should I wear: Please wear loose clothing, such as a sweat suit or jogging clothes.   Avoid snaps, zippers and other metal. We may ask you to undress and put on a hospital gown.  How long does the exam take: Most scans take less than 20 minutes.  What should I bring: Please bring any scans or X-rays taken at other hospitals, if similar tests were done. Also bring a list of your medicines, including vitamins, minerals and over-the-counter drugs. It is safest to leave personal items at home.  Do I need a :  No  is needed.  What do I need to tell my doctor? Be sure to tell your doctor: * If you have any allergies. * If there s any chance you are pregnant. * If you are breastfeeding. * If you have diabetes as your medication may need to be adjusted for this exam.  What should I do after the exam: No restrictions, You may resume normal activities.  What is this test: A CT (computed tomography) scan is a series of pictures that allows us to look inside your body. The scanner creates images of the body in cross sections, much like slices of bread. This helps us see any problems more clearly. You may receive contrast (X-ray dye) before or during your scan. Contrast is given through an IV (small needle in your arm).  Who should I call with questions: If you have any questions, please call the Imaging Department where you will have your exam. Directions, parking instructions, and other information is available on our website, [email or web address].              Who to contact     If you have questions or need follow up information about today's clinic visit or your schedule please contact [facility] directly at [phone].  Normal or non-critical lab and imaging results will be communicated to you by MyChart, letter or phone within 4 business days after the clinic has received the results. If you do not hear from us within 7 days, please contact the clinic through MyChart or phone. If you have a critical or abnormal lab result, we will notify you by phone as soon as possible.  Submit  "refill requests through [employer] or call your pharmacy and they will forward the refill request to us. Please allow 3 business days for your refill to be completed.          Additional Information About Your Visit        [employer] Information     [employer] gives you secure access to your electronic health record. If you see a primary care provider, you can also send messages to your care team and make appointments. If you have questions, please call your primary care clinic.  If you do not have a primary care provider, please call [phone] and they will assist you.        Care EveryWhere ID     This is your Care EveryWhere ID. This could be used by other organizations to access your [facility] medical records  [id number]        Your Vitals Were     Pulse Temperature Respirations Height Pulse Oximetry BMI (Body Mass Index)    83 97.4  F (36.3  C) (Oral) 14 6' 0.5\" (1.842 m) 98% 33.04 kg/m2       Blood Pressure from Last 3 Encounters:   11/14/18 144/83   10/12/18 128/82   09/14/18 122/77    Weight from Last 3 Encounters:   11/14/18 247 lb (112 kg)   10/12/18 245 lb 8 oz (111.4 kg)   09/14/18 243 lb 4.8 oz (110.4 kg)              Today, you had the following     No orders found for display       Primary Care Provider Office Phone # Fax #    Neida Benz [phone]       [address] [address]        Equal Access to Services     [facility] : Hadii aad ku hadasho Soomaali, waaxda luqadaha, qaybta kaalmada adeegyada, snehal weaver adeeduardo birch . So [facility] [phone].    ATENCIÓN: Si habla español, tiene a alvarado disposición servicios gratuitos de asistencia lingüística. Llame al [phone].    We comply with applicable federal civil rights laws and [place] laws. We do not discriminate on the basis of race, color, national origin, age, disability, sex, sexual orientation, or gender identity.            Thank you!     Thank you for choosing [facility]  for your care. Our goal is always to provide you with excellent care. Hearing back from our patients is one way we can continue to improve our services. Please take a few minutes to complete the written survey that you may receive in the mail after your visit with us. Thank you!             Your Updated Medication List - Protect others around you: Learn how to safely use, store and throw away your medicines at www.disposemymeds.org.          This list is accurate as of 11/14/18  4:33 PM.  Always use your most recent med list.                   Brand Name Dispense Instructions for use Diagnosis    acetaminophen 325 MG tablet    TYLENOL    100 tablet    Take 2 tablets (650 mg) by mouth every 4 hours as needed for mild pain    Other headache syndrome       aspirin 325 MG EC tablet     60 tablet    Take 1 tablet (325 mg) by mouth daily    Cerebrovascular accident (CVA) due to other mechanism       bisacodyl 10 MG Suppository    DULCOLAX    5 suppository    Place 1 suppository (10 mg) rectally daily as needed for constipation    Constipation, unspecified constipation type       buPROPion 150 MG 24 hr tablet    WELLBUTRIN XL    90 tablet    TAKE 1 TABLET (150 MG) BY MOUTH EVERY MORNING - NEEDS APPOINTMENT    Major depressive disorder, recurrent episode, moderate (H)       ORDER FOR ALLERGEN IMMUNOTHERAPY 5 mL vial           order for DME      AIRSENSE 10 7-12 CM/H20 NASAL WISP        senna-docusate 8.6-50 MG per tablet    SENOKOT-S;PERICOLACE    100 tablet    Take 1-2 tablets by mouth 2 times daily as needed for constipation    Constipation, unspecified constipation type       sertraline 100 MG tablet    ZOLOFT    90 tablet    Take 1 tablet (100 mg) by mouth daily    Major depressive disorder, recurrent episode, moderate (H)

## 2018-11-14 NOTE — LETTER
[facility]  [address]  [address]  [place], [place] [address]  [phone]    November 14, 2018    RE:  Vahe Haas                                                                                                                          [address]  [facility] [address]            To whom it may concern:    Vahe Haas is under my professional care for Vertebral artery dissection (H).  Due to persistent symptoms, some of the time restrictions need to be extended for his return to work plan.      --He should continue to work four hours a day maximum from now until Wednesday, 11/28/18.  --On Wednesday, 11/28/18, he can start working six hours a day maximum.  --On Wednesday, 12/19/18, he can start working eight hours a day maximum.        Sincerely,        Neida Benz MD      Clinic hours:  Monday 7:30 AM - 5:00 PM    Tuesday  7:00 AM - 7:00 PM    Wednesday  7:00 AM - 5:00 PM    Thursday  7:30 AM -  7:00 PM    Friday   7:30 AM -  5:00 PM

## 2018-11-14 NOTE — PROGRESS NOTES
SUBJECTIVE:   Vahe Haas is a 43 year old male who presents to clinic today for the following health issues:  Follow up for disability forms    F/u Vertebral artery dissection, return to work.  He has been back at work almost 3 wks now s/p vertebral artery dissection and subsequent CVA.    He's been working 4 hrs a day, and it's been tough, and the whole of that time is serious 'thinking'.  He feels he's doing well at work, but he's exhausted by the time he gets home.  He is not having any problems remembering things, or doing the work while he's there as far as he can tell.  He is able to make it through the 4 hrs, but is extremely tired the rest of the day at home.  Mostly just 'sits there' at home.    His sleep is fine, though he is sleeping a lot more than he was normally prior to the incident.  He is currently sleeping ~10-11 hrs at night (used to get ~6 hrs at night). Hasn't taken any naps in 5-6 wks.  He has been getting up at ~10am, works from ~noon to around 4pm.    Return to work-  Initial plan was to increase to 6hrs/day this week, but he's still working 4 hrs/day this week.  The 'after work exhaustion' has improved a bit, but not much over the 2 1/2 wks he's been working 4 hrs/day.    He just finished PT, and he has three more weeks of speech therapy.    Mood- he feels like mood is good on the zoloft and wellbutrin, and he feels like he actually has less sexual side effects on the meds than before.  He does note that he is biting his nails more, which can be a sign of anxiety for him, but he otherwise hasn't been feeling significant anxiety sx's.      Problem list and histories reviewed & adjusted, as indicated.  Additional history: as documented      Patient Active Problem List   Diagnosis     Allergic rhinitis     Insomnia     CARDIOVASCULAR SCREENING; LDL GOAL LESS THAN 160     Moderate major depression     Drug-induced erectile dysfunction     JANA (obstructive sleep apnea)     Acute bilateral  thoracic back pain     Acute bilateral low back pain without sciatica     Vertebral artery dissection (H)     Past Surgical History:   Procedure Laterality Date     HC TOOTH EXTRACTION W/FORCEP  2003       Social History     Tobacco Use     Smoking status: Never Smoker     Smokeless tobacco: Never Used   Substance Use Topics     Alcohol use: Yes     Alcohol/week: 0.0 oz     Comment: social, 0-2 times per week     Family History   Problem Relation Age of Onset     Hypertension Mother      Allergies Mother      Depression Mother      Lipids Mother      Musculoskeletal Disorder Mother      Osteoporosis Mother      Respiratory Mother         asthma     Allergies Father      Lipids Father      Respiratory Father         asthma     Breast Cancer Paternal Grandmother      Hypertension Maternal Grandmother      Cerebrovascular Disease Maternal Grandmother      Allergies Maternal Grandmother      Arthritis Maternal Grandmother      Lipids Maternal Grandmother      Osteoporosis Maternal Grandmother      Hypertension Paternal Grandfather      Depression Paternal Grandfather      Gastrointestinal Disease Paternal Grandfather         ulcers     Lipids Paternal Grandfather      Allergies Sister      Respiratory Sister         asthma     Thyroid Disease Sister          Current Outpatient Medications   Medication Sig Dispense Refill     acetaminophen (TYLENOL) 325 MG tablet Take 2 tablets (650 mg) by mouth every 4 hours as needed for mild pain 100 tablet 0     ALLERGY SHOTS        aspirin 325 MG EC tablet Take 1 tablet (325 mg) by mouth daily 60 tablet 0     bisacodyl (DULCOLAX) 10 MG Suppository Place 1 suppository (10 mg) rectally daily as needed for constipation 5 suppository 1     order for DME AIRSENSE 10  7-12 CM/H20  NASAL WISP       senna-docusate (SENOKOT-S;PERICOLACE) 8.6-50 MG per tablet Take 1-2 tablets by mouth 2 times daily as needed for constipation 100 tablet 0     buPROPion (WELLBUTRIN XL) 150 MG 24 hr tablet TAKE  "1 TABLET (150 MG) BY MOUTH EVERY MORNING - NEEDS APPOINTMENT 90 tablet 1     sertraline (ZOLOFT) 100 MG tablet Take 1 tablet (100 mg) by mouth daily 90 tablet 1     Allergies   Allergen Reactions     Seasonal Allergies      Recent Labs   Lab Test 09/04/18  1040 09/03/18  0934 02/17/16  1202 02/13/13  1116  12/23/10  1442   A1C  --  5.6  --   --   --   --    *  --  124* 122   < >  --    HDL 37*  --  30* 29*   < >  --    TRIG 99  --  167* 98   < >  --    ALT  --  29  --   --   --   --    CR 1.01 1.04 1.02 1.03   < >  --    GFRESTIMATED 81 78 81 82   < >  --    GFRESTBLACK >90 >90 >90   GFR Calc   >90   < >  --    POTASSIUM 4.0 3.5 3.9 3.9   < >  --    TSH  --   --   --   --   --  1.19    < > = values in this interval not displayed.         Labs reviewed in EPIC    Reviewed and updated as needed this visit by clinical staff  Tobacco  Allergies  Meds  Problems  Med Hx  Surg Hx  Fam Hx  Soc Hx        Reviewed and updated as needed this visit by Provider  Tobacco  Allergies  Meds  Problems  Med Hx  Surg Hx  Fam Hx         ROS:  Constitutional, HEENT, cardiovascular, pulmonary, gi and gu systems are negative, except as otherwise noted.    OBJECTIVE:     /83   Pulse 83   Temp 97.4  F (36.3  C) (Oral)   Resp 14   Ht 1.842 m (6' 0.5\")   Wt 112 kg (247 lb)   SpO2 98%   BMI 33.04 kg/m    Body mass index is 33.04 kg/m .  GENERAL APPEARANCE: healthy, alert and no distress     EYES: PERRL, sclera clear     HENT: nose and mouth without ulcers or lesions     NECK: no adenopathy, no asymmetry, masses, or scars and thyroid normal to palpation     RESP: lungs clear to auscultation - no rales, rhonchi or wheezes     CV: regular rates and rhythm, normal S1 S2, no S3 or S4 and no murmur, click or rub      Abdomen: soft, nontender, no HSM or masses and bowel sounds normal     Ext: warm, dry, no edema      Psych: full range affect, normal speech and grooming, judgement and insight intact     "  Neuro: CN 2-12 grossly nl, fundi WNL bilaterally, UE and LE strength 5/5, nl sensation and DTR's.  Normal gait.       ASSESSMENT/PLAN:       ICD-10-CM    1. Vertebral artery dissection (H) I77.74    2. Moderate major depression F32.1    3. Elevated BP without diagnosis of hypertension R03.0      S/p Vertebral artery dissection with subsequent acute CVA-   Work letter written to slow his return to work hours.  He feels he is doing okay at work for the current four hour days, but he is exhausted when he gets home, not able to do much at all, and is sleeping 10/11 hours at night (from his usual 6hrs/nt prior to incident).  Recommend that he extend his four hour days for a bit more, increasing to a six hour day max in another two weeks- on Wed 11/28/18.  He can work 6hr days max for three weeks from 11/28/18 to 12/19/18, and then he can work 8 hr days max if he is doing fine.      BP elevation- Recommend a f/u appointment mid-December to follow-up, and recheck blood pressure (had discussed 3mo at his appt, but should see him sooner).  He should check his blood pressure on his own in the meantime, and follow-up sooner if SBP >140.    Return in about 1 month (around 12/14/2018) for Depression follow-up, work restrictions, BP Recheck.      Neida Benz MD  [facility]

## 2018-11-14 NOTE — NURSING NOTE
"Chief Complaint   Patient presents with     Forms     disability forms       Initial /83  Pulse 83  Temp 97.4  F (36.3  C) (Oral)  Resp 14  Ht 6' 0.5\" (1.842 m)  Wt 247 lb (112 kg)  SpO2 98%  BMI 33.04 kg/m2 Estimated body mass index is 33.04 kg/(m^2) as calculated from the following:    Height as of this encounter: 6' 0.5\" (1.842 m).    Weight as of this encounter: 247 lb (112 kg).  BP completed using cuff size: large    Health Maintenance that is potentially due pending provider review:  Health Maintenance Due   Topic Date Due     HIV SCREEN (SYSTEM ASSIGNED)  07/25/1994     DEPRESSION ACTION PLAN Q1 YR  08/17/2017     INFLUENZA VACCINE (1) 09/01/2018         Flu vaccine done  "

## 2018-11-15 ENCOUNTER — HOSPITAL ENCOUNTER (OUTPATIENT)
Dept: SPEECH THERAPY | Facility: CLINIC | Age: 43
Setting detail: THERAPIES SERIES
End: 2018-11-15
Attending: PHYSICIAN ASSISTANT
Payer: COMMERCIAL

## 2018-11-15 PROCEDURE — 92507 TX SP LANG VOICE COMM INDIV: CPT | Mod: GN | Performed by: STUDENT IN AN ORGANIZED HEALTH CARE EDUCATION/TRAINING PROGRAM

## 2018-11-15 PROCEDURE — 40000211 ZZHC STATISTIC SLP  DEPARTMENT VISIT: Performed by: STUDENT IN AN ORGANIZED HEALTH CARE EDUCATION/TRAINING PROGRAM

## 2018-11-20 ENCOUNTER — HOSPITAL ENCOUNTER (OUTPATIENT)
Dept: SPEECH THERAPY | Facility: CLINIC | Age: 43
Setting detail: THERAPIES SERIES
End: 2018-11-20
Attending: PHYSICIAN ASSISTANT
Payer: COMMERCIAL

## 2018-11-20 PROCEDURE — 92507 TX SP LANG VOICE COMM INDIV: CPT | Mod: GN | Performed by: STUDENT IN AN ORGANIZED HEALTH CARE EDUCATION/TRAINING PROGRAM

## 2018-11-20 PROCEDURE — 40000211 ZZHC STATISTIC SLP  DEPARTMENT VISIT: Performed by: STUDENT IN AN ORGANIZED HEALTH CARE EDUCATION/TRAINING PROGRAM

## 2018-11-20 NOTE — PROGRESS NOTES
Outpatient Speech Language Pathology Discharge Note     Patient: Vahe Haas  : 1975    Beginning/End Dates of Reporting Period:  10/25/2018 to 2018; 4 sessions since last progress note    Referring Provider: Deb Christiansen PA-C    Therapy Diagnosis: Mild expressive aphasia    Client Self Report: Patient arrived on time with partially completed HEP.  He reports continued improvement with his language skills, noting that he is only experiencing symptoms when he is fatigued.  He will be increasing to 3/4 time next week.     Objective Measurements:      Pt benefitting from min extra time to complete complex VE tasks today.    HEP: 100% accy for moderately complex analogies.  VE: Pt completed a categorization grid with missing general categories, subcategories, and specific members with 100% accy independently.  Pt completed a word chaining task from definitions with 100% accy given no-min cues.  Pt completed moderately complex verbal analogies with 96% accy independently with min extra processing time, improving to 100% accy given min semantic cues.  RC: Pt answered questions about a multi-paragraph narrative reading with 100% accy given no-min cues.  WE: 100% accy for grammar and spelling at the paragraph level independently.        Goals:  Goal Identifier Speech   Goal Description Patient will learn, implement, and demonstrate use of speech intelligibility strategies to increase intelligibility at the extended reading and spontaneous conversation level to >95% accy given min cues to meet daily communication needs.   Target Date 18   Date Met  10/25/18   Progress: Goal met 10/25/18, not targeted this reporting period.     Goal Identifier Verbal expression   Goal Description Patient will learn, implement, and demonstrate use of 3 word-finding strategies with 90% accuracy during complex word finding tasks given min cues in order to support verbal expression for everyday language tasks at work and  at home.   Target Date 12/19/18   Date Met  11/20/18   Progress:  Good, goal met per objective measures above.  Pt benefitting from min extra processing time to complete VE tasks.     Goal Identifier Written expression   Goal Description Patient will complete a complex writing task (e.g. write or type an email 2-3 paragraphs in length) with 90% accy for spelling and grammar when given min assist to meet daily writing needs.   Target Date 12/19/18   Date Met  11/20/18   Progress:  Good, goal met per objective measures above.     Goal Identifier Reading comprehension   Goal Description Patient will complete a complex reading task (e.g. read a 1-2 paragraph length story and answer questions) with 90% accy when given min assist to meet daily functional reading needs.   Target Date 12/19/18   Date Met  11/20/18   Progress:  Good, goal met per objective measures above.       Progress Toward Goals:    Progress this reporting period: Good, all goals met.  Patient has continued to demonstrate consistent improvement in verbal/written expression and reading comprehension goal areas over this past reporting period.  Processing time has improved with all tasks, with patient needing only min extra time to complete tasks, especially if he is fatigued.  He continues to be accurate in complex verbal and written expression and reading comprehension tasks with no-min support from SLP.  Patient feels that he is at his pre-CVA baseline on all goal areas, with the exception of when he is feeling fatigued.  He is motivated to continue a home program of practice of therapy techniques and exercises upon discharge.  No further skilled SLP services needed at this time.    Plan:  Discharge from therapy.    Discharge:    Reason for Discharge: Patient has met all goals.      Discharge Plan: Patient to continue home program.      Allison Alpers, B.A. (Aviir), M.A., CCC-SLP  Speech-Language Pathologist  Certificate of Vocology  [facility]  [phone]

## 2018-12-02 DIAGNOSIS — F33.1 MAJOR DEPRESSIVE DISORDER, RECURRENT EPISODE, MODERATE (H): ICD-10-CM

## 2018-12-03 ENCOUNTER — MYC MEDICAL ADVICE (OUTPATIENT)
Dept: FAMILY MEDICINE | Facility: CLINIC | Age: 43
End: 2018-12-03

## 2018-12-03 NOTE — TELEPHONE ENCOUNTER
"Requested Prescriptions   Pending Prescriptions Disp Refills     buPROPion (WELLBUTRIN XL) 150 MG 24 hr tablet [Pharmacy Med Name: BUPROPION HCL  MG TABLET] 90 tablet 1     Sig: TAKE 1 TABLET BY MOUTH EVERY MORNING. APPOINTMENT NEEDED FOR FURTHER REFILLS.    SSRIs Protocol Failed    12/2/2018 12:49 AM       Failed - PHQ-9 score less than 5 in past 6 months    Please review last PHQ-9 score.          Passed - Medication is Bupropion    If the medication is Bupropion (Wellbutrin), and the patient is taking for smoking cessation; OK to refill.         Passed - Patient is age 18 or older       Passed - Recent (6 mo) or future (30 days) visit within the authorizing provider's specialty    Patient had office visit in the last 6 months or has a visit in the next 30 days with authorizing provider or within the authorizing provider's specialty.  See \"Patient Info\" tab in inbasket, or \"Choose Columns\" in Meds & Orders section of the refill encounter.            "

## 2018-12-03 NOTE — TELEPHONE ENCOUNTER
Last Rx 12/1/17 for #90 with 1 refills  Last OV 9/4/18. PHQ9 = 14.  Note from 11/14/18 OV:  Mood- he feels like mood is good on the zoloft and wellbutrin, and he feels like he actually has less sexual side effects on the meds than before.  He does note that he is biting his nails more, which can be a sign of anxiety for him, but he otherwise hasn't been feeling significant anxiety sx's.    Recommend a f/u appointment mid-December to follow-up,    [facility] message sent to patient.  Blanca Bourgeois RN

## 2018-12-05 ENCOUNTER — MYC MEDICAL ADVICE (OUTPATIENT)
Dept: FAMILY MEDICINE | Facility: CLINIC | Age: 43
End: 2018-12-05

## 2018-12-05 RX ORDER — BUPROPION HYDROCHLORIDE 150 MG/1
TABLET ORAL
Start: 2018-12-05

## 2018-12-05 NOTE — TELEPHONE ENCOUNTER
Note from 12/3 Toño:  I think I have 2 on there and they refilled one, and Dr. Ferguson extended the other.    I'm picking it up today.    Blanca Bourgeois RN      Sent back Toño that CW would like to see him for follow up middle of this month

## 2018-12-05 NOTE — TELEPHONE ENCOUNTER
We can send AVS to patient.  If it's actual OV notes patient will need to call [facility].  Blanca Bourgeois RN

## 2018-12-06 ENCOUNTER — HOSPITAL ENCOUNTER (OUTPATIENT)
Dept: CT IMAGING | Facility: CLINIC | Age: 43
Discharge: HOME | End: 2018-12-06
Attending: PHYSICIAN ASSISTANT | Admitting: PHYSICIAN ASSISTANT
Payer: COMMERCIAL

## 2018-12-06 DIAGNOSIS — I63.89 CEREBROVASCULAR ACCIDENT (CVA) DUE TO OTHER MECHANISM (H): ICD-10-CM

## 2018-12-06 DIAGNOSIS — I77.74 VERTEBRAL ARTERY DISSECTION (H): ICD-10-CM

## 2018-12-06 PROCEDURE — 25000128 H RX IP 250 OP 636: Performed by: PHYSICIAN ASSISTANT

## 2018-12-06 PROCEDURE — 70498 CT ANGIOGRAPHY NECK: CPT

## 2018-12-06 PROCEDURE — 25000125 ZZHC RX 250: Performed by: PHYSICIAN ASSISTANT

## 2018-12-06 RX ORDER — IOPAMIDOL 755 MG/ML
70 INJECTION, SOLUTION INTRAVASCULAR ONCE
Status: COMPLETED | OUTPATIENT
Start: 2018-12-06 | End: 2018-12-06

## 2018-12-06 RX ADMIN — IOPAMIDOL 70 ML: 755 INJECTION, SOLUTION INTRAVENOUS at 12:25

## 2018-12-06 RX ADMIN — SODIUM CHLORIDE, PRESERVATIVE FREE 90 ML: 5 INJECTION INTRAVENOUS at 12:24

## 2018-12-18 ENCOUNTER — CARE COORDINATION (OUTPATIENT)
Dept: MEDSURG UNIT | Facility: CLINIC | Age: 43
End: 2018-12-18

## 2018-12-18 NOTE — PROGRESS NOTES
Writer attempted to reach pt via telephone x3 for 90 day post CVA mRs score. No answer x3, therefore unable to obtain mRs score.

## 2018-12-19 ENCOUNTER — OFFICE VISIT (OUTPATIENT)
Dept: FAMILY MEDICINE | Facility: CLINIC | Age: 43
End: 2018-12-19
Payer: COMMERCIAL

## 2018-12-19 VITALS
DIASTOLIC BLOOD PRESSURE: 91 MMHG | WEIGHT: 254.9 LBS | HEART RATE: 78 BPM | BODY MASS INDEX: 33.78 KG/M2 | SYSTOLIC BLOOD PRESSURE: 131 MMHG | HEIGHT: 73 IN | OXYGEN SATURATION: 100 % | RESPIRATION RATE: 16 BRPM

## 2018-12-19 DIAGNOSIS — R03.0 ELEVATED BP WITHOUT DIAGNOSIS OF HYPERTENSION: ICD-10-CM

## 2018-12-19 DIAGNOSIS — F33.1 MAJOR DEPRESSIVE DISORDER, RECURRENT EPISODE, MODERATE (H): ICD-10-CM

## 2018-12-19 DIAGNOSIS — I77.74 VERTEBRAL ARTERY DISSECTION (H): Primary | ICD-10-CM

## 2018-12-19 PROCEDURE — 99214 OFFICE O/P EST MOD 30 MIN: CPT | Performed by: FAMILY MEDICINE

## 2018-12-19 RX ORDER — SERTRALINE HYDROCHLORIDE 100 MG/1
100 TABLET, FILM COATED ORAL DAILY
Qty: 90 TABLET | Refills: 1 | Status: SHIPPED | OUTPATIENT
Start: 2018-12-19 | End: 2019-07-16

## 2018-12-19 RX ORDER — BUPROPION HYDROCHLORIDE 150 MG/1
TABLET ORAL
Qty: 90 TABLET | Refills: 1 | Status: SHIPPED | OUTPATIENT
Start: 2018-12-19 | End: 2019-02-13

## 2018-12-19 ASSESSMENT — MIFFLIN-ST. JEOR: SCORE: 2102.16

## 2018-12-19 ASSESSMENT — PATIENT HEALTH QUESTIONNAIRE - PHQ9: SUM OF ALL RESPONSES TO PHQ QUESTIONS 1-9: 5

## 2018-12-19 NOTE — Clinical Note
Pt and his wife have been trying to arrange f/u s/p hospital stay s/p vertebral artery dissection.Found your names based on ordering the CTA and last discussion about being seen at the [facility] for neurology.Please help arrange in your department for this pt.Thanks,Rehan Benz MDKevin Ville [phone]

## 2018-12-19 NOTE — NURSING NOTE
"Chief Complaint   Patient presents with     RECHECK     Needs to have his Short Term Disability updated     BP (!) 131/91   Pulse 78   Resp 16   Ht 1.842 m (6' 0.5\")   Wt 115.6 kg (254 lb 14.4 oz)   SpO2 100%   BMI 34.10 kg/m   Estimated body mass index is 34.1 kg/m  as calculated from the following:    Height as of this encounter: 1.842 m (6' 0.5\").    Weight as of this encounter: 115.6 kg (254 lb 14.4 oz).  Medication Reconciliation: complete        Health Maintenance Due Pending Provider Review:  NONE    n/a    Vicky Colmenares MA Lead  [facility]  [phone]  "

## 2018-12-20 NOTE — PROGRESS NOTES
SUBJECTIVE:   Vahe Haas is a 43 year old male who presents to clinic today for the following health issues:    S/p Stroke (vertebral artery dissection) update-  Work going okay- 6hrs/day until today- will increase to full time, 8hrs/day as of today.    Feels ready, though he is still sleepy/tired at home.  Sleeping a lot on weekends.  Sleeping 8hrs/nt during week, waking feeling rested.   Making it through work okay, though.  Not feeling overwhelmed.  Supported well at work.  Speech sx's s/p stroke is coming along- feels it's back to ~98%.  He's done with speech therapy- done with all therapies.  Typing- would do better if he looked, but usually hasn't had to look, so trying not to to practice.  Getting better.    Doing [place] puzzles, which is helping with word finding skills.  Doing well with Mondays, but Tuesdays are still tough.  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    Scheduling f/u with neurology as recommended at discharge has been an issue.   Pt thought he has f/u with neurology at the [facility]- not scheduled per chart review.  At his discharge from the hospital, they were indirectly told to do the f/u appt at [facility], but his wife has not had good experiences there, so requested he be seen at the [place].  Per chart review, they looked at having him see a non-stroke specialist for initial f/u, which they agreed to, but it looks like that has not been scheduled?  He is also not sure that he has heard the results of his CT angiogram of head/neck from 12/6/18.    Short Term Disability update- pt states part of his leave has been approved, but part has not.  Will let us know if more paperwork is needed.      MDD- he feels like his sx's are well controlled on the zoloft 100mg/d and wellbutrin XL 150mg/d.  No known se's.  PHQ-9 is 5 today.      BP elevation- Bp noted to be higher at his last appt at 144/83.  Today it is at 131/91.  This has not been an issue for him in the past-  usually 120/70s.  Wt has gone up- just not moving much or exercising.  Parking lot to work is a walk.  They do have a work-out facility at work - he hasn't used it, but could.    He has a friend who does- can try and make him his artemio.  Diet- they eat out most of the time.  Will try and do more snacking on fruits/vegetables.      Problem list and histories reviewed & adjusted, as indicated.  Additional history: as documented      Patient Active Problem List   Diagnosis     Allergic rhinitis     Insomnia     CARDIOVASCULAR SCREENING; LDL GOAL LESS THAN 160     Moderate major depression     Drug-induced erectile dysfunction     JNAA (obstructive sleep apnea)     Acute bilateral thoracic back pain     Acute bilateral low back pain without sciatica     Vertebral artery dissection (H)     Past Surgical History:   Procedure Laterality Date     HC TOOTH EXTRACTION W/FORCEP  2003       Social History     Tobacco Use     Smoking status: Never Smoker     Smokeless tobacco: Never Used   Substance Use Topics     Alcohol use: Yes     Alcohol/week: 0.0 oz     Comment: social, 0-2 times per week     Family History   Problem Relation Age of Onset     Hypertension Mother      Allergies Mother      Depression Mother      Lipids Mother      Musculoskeletal Disorder Mother      Osteoporosis Mother      Respiratory Mother         asthma     Allergies Father      Lipids Father      Respiratory Father         asthma     Breast Cancer Paternal Grandmother      Hypertension Maternal Grandmother      Cerebrovascular Disease Maternal Grandmother      Allergies Maternal Grandmother      Arthritis Maternal Grandmother      Lipids Maternal Grandmother      Osteoporosis Maternal Grandmother      Hypertension Paternal Grandfather      Depression Paternal Grandfather      Gastrointestinal Disease Paternal Grandfather         ulcers     Lipids Paternal Grandfather      Allergies Sister      Respiratory Sister         asthma     Thyroid Disease  Sister          Current Outpatient Medications   Medication Sig Dispense Refill     acetaminophen (TYLENOL) 325 MG tablet Take 2 tablets (650 mg) by mouth every 4 hours as needed for mild pain 100 tablet 0     ALLERGY SHOTS        aspirin 325 MG EC tablet Take 1 tablet (325 mg) by mouth daily 60 tablet 0     bisacodyl (DULCOLAX) 10 MG Suppository Place 1 suppository (10 mg) rectally daily as needed for constipation 5 suppository 1     buPROPion (WELLBUTRIN XL) 150 MG 24 hr tablet TAKE 1 TABLET (150 MG) BY MOUTH EVERY MORNING - NEEDS APPOINTMENT 90 tablet 1     order for DME AIRSENSE 10  7-12 CM/H20  NASAL WISP       senna-docusate (SENOKOT-S;PERICOLACE) 8.6-50 MG per tablet Take 1-2 tablets by mouth 2 times daily as needed for constipation 100 tablet 0     sertraline (ZOLOFT) 100 MG tablet Take 1 tablet (100 mg) by mouth daily 90 tablet 1     Allergies   Allergen Reactions     Seasonal Allergies      Recent Labs   Lab Test 09/04/18  1040 09/03/18  0934 02/17/16  1202 02/13/13  1116  12/23/10  1442   A1C  --  5.6  --   --   --   --    *  --  124* 122   < >  --    HDL 37*  --  30* 29*   < >  --    TRIG 99  --  167* 98   < >  --    ALT  --  29  --   --   --   --    CR 1.01 1.04 1.02 1.03   < >  --    GFRESTIMATED 81 78 81 82   < >  --    GFRESTBLACK >90 >90 >90   GFR Calc   >90   < >  --    POTASSIUM 4.0 3.5 3.9 3.9   < >  --    TSH  --   --   --   --   --  1.19    < > = values in this interval not displayed.      BP Readings from Last 3 Encounters:   12/19/18 (!) 131/91   11/14/18 144/83   10/12/18 128/82    Wt Readings from Last 3 Encounters:   12/19/18 115.6 kg (254 lb 14.4 oz)   11/14/18 112 kg (247 lb)   10/12/18 111.4 kg (245 lb 8 oz)           Labs reviewed in EPIC    Reviewed and updated as needed this visit by clinical staff  Tobacco  Allergies  Meds  Problems  Med Hx  Surg Hx  Fam Hx  Soc Hx        Reviewed and updated as needed this visit by Provider  Tobacco  Allergies  Meds  " Problems  Med Hx  Surg Hx  Fam Hx         ROS:  Constitutional, HEENT, cardiovascular, pulmonary, gi and gu systems are negative, except as otherwise noted.    OBJECTIVE:     BP (!) 131/91   Pulse 78   Resp 16   Ht 1.842 m (6' 0.5\")   Wt 115.6 kg (254 lb 14.4 oz)   SpO2 100%   BMI 34.10 kg/m    Body mass index is 34.1 kg/m .  GENERAL APPEARANCE: healthy, alert and no distress     EYES: PERRL, sclera clear     HENT: nose and mouth without ulcers or lesions     NECK: no adenopathy, no asymmetry, masses, or scars and thyroid normal to palpation     RESP: lungs clear to auscultation - no rales, rhonchi or wheezes     CV: regular rates and rhythm, normal S1 S2, no S3 or S4 and no murmur, click or rub      Abdomen: soft, nontender, no HSM or masses and bowel sounds normal     Ext: warm, dry, no edema      Neuro: CN 2-12 grossly nl, fundi WNL bilaterally, UE and LE strength 5/5, nl sensation and DTR's.  Normal gait.       Psych: full range affect, normal speech and grooming, judgement and insight intact     Diagnostic Test Results:  Results for orders placed or performed during the hospital encounter of 12/06/18   CTA Head Neck with Contrast    Narrative    CT ANGIOGRAM OF THE HEAD AND NECK WITH CONTRAST  12/6/2018 4:31 PM     HISTORY: Needs follow up CTA head/neck in 3 months status post CVA per  neurology request. Vertebral artery dissection (H). Cerebrovascular  accident (CVA) due to other mechanism.    TECHNIQUE:  CT angiography with an injection of 70mL [id number]-370 IV  with scans through the head and neck. Images were transferred to a  separate 3-D workstation where multiplanar reformations and 3-D images  were created. Estimates of carotid stenoses are made relative to the  distal internal carotid artery diameters except as noted. Radiation  dose for this scan was reduced using automated exposure control,  adjustment of the mA and/or kV according to patient size, or iterative  reconstruction " technique.    COMPARISON: CTA 9/3/2018     CT HEAD FINDINGS: Left PCA distribution infarct involving the left  occipital lobe/medial temporal lobe has evolved decreased  swelling/edema. Right cerebellar infarcts have decreased in size and  attenuation compared to the prior exam.    CT ANGIOGRAM HEAD FINDINGS:     The intracranial vertebral arteries, basilar artery, and posterior  cerebral arteries are patent. The internal carotid arteries, anterior  cerebral arteries and middle cerebral arteries are patent. No  aneurysms or vascular malformations are identified. Dural venous  sinuses are unremarkable. Right transverse sinus is dominant.     CT ANGIOGRAM NECK FINDINGS:   The vertebral arteries are patent. The right vertebral artery has  recanalized. The common carotid arteries, internal carotid arteries,  and external carotid arteries are patent. No evidence of new  dissection, occlusion, or flow-limiting stenosis.    The bilateral tonsillar tissue is enlarged. Bilateral palatine  tonsilliths are present. There are mild degenerative changes  throughout the cervical spine with degenerative disc disease most  severe at C5-C6.       Impression    IMPRESSION: Right vertebral artery has recanalized. Patent arteries in  the head and neck. Expected evolution of left PCA and right cerebellar  infarcts.    GEORGETTE SCOTT MD       ASSESSMENT/PLAN:     (I77.74) Vertebral artery dissection (H)  (primary encounter diagnosis)  Comment: Sx's improving, now done with therapies.  He is about to start back full-time at work (currently at 6 hrs/day)- feels mostly ready, though he's still tired on nights/weekends.  He does not think his FMLA for short-term disability is going through completely.  He also still does not have a f/u appt scheduled with neurology per chart review today.  It looks like someone at [facility] said he could f/u with a non-stroke specialist, and forwarded it to schedulers, but no follow-up after that point.   He has had a CTA neck/brain done through neurology on 12/6/18- pt unsure if he's heard the results of that study.  Will forward this note on to the provider who ordered the study, the person they last talked to at the [place], and refer him to care coordination to ensure f/u is scheduled and completed.  Plan: CARE COORDINATION REFERRAL            (F33.1) Major depressive disorder, recurrent episode, moderate (H)  Comment: Stable despite stressors with no se's on current meds, will continue.  Refills sent.  Cont q6mo f/u.  Plan: sertraline (ZOLOFT) 100 MG tablet, buPROPion         (WELLBUTRIN XL) 150 MG 24 hr tablet            (R03.0) Elevated BP without diagnosis of hypertension  Comment: BP bit better today, but still boderline, especially with recent events.  Plan: Recommended more activity, healthier eating, less salt- recommended getting in fresh fruits and vegetables - as snacks if they are eating out for their meals.       Return in about 4 weeks (around 1/16/2019) for BP Recheck, follow-up vertebral artery dissection, mood.         Neida Benz MD  [facility]

## 2018-12-21 ENCOUNTER — PATIENT OUTREACH (OUTPATIENT)
Dept: CARE COORDINATION | Facility: CLINIC | Age: 43
End: 2018-12-21

## 2018-12-21 NOTE — PROGRESS NOTES
Clinic Care Coordination Contact  [facility]/Voicemail    Referral Source: PCP - Patient needs follow-up with neurology but hasn't scheduled.  Clinical Data: Care Coordinator Outreach  Outreach attempted x 1.  Left message on voicemail with call back information and requested return call.  Plan: Care Coordinator will try to reach patient again in 1-2 business days.    [facility]  Social Work Care Coordinator   [facility]  [phone]

## 2019-01-07 ENCOUNTER — TELEPHONE (OUTPATIENT)
Dept: FAMILY MEDICINE | Facility: CLINIC | Age: 44
End: 2019-01-07

## 2019-01-07 NOTE — TELEPHONE ENCOUNTER
Reviewing pt's chart.  He still has not been scheduled for f/u of his vertebral artery dissection back in 9/18.  They were referred for f/u to [facility] due due to inability to see him at the [facility] stroke service. However, as a family, they have had bad experiences at [facility], and told the [facility] team that they were okay seeing general neurology at the  rather than f/u with the stroke specialist.  The message from there team on back in 11/16-20/18 was that they were going to contact pt's wife to schedule (see msg copied below), but it does not appear that that happened.    I have encouraged pt to call back to schedule, but it looks like he has not- he is still in recovery from his stroke and overwhelmed.    I had referred his case to our care coordinator back in 12/18, but it looks like there was one call to pt on 12/21/18, and no follow up after that time.    Called [facility] clinic today, and discussed difficulty with pt getting f/u from 9/18 hospitalization.  Individual (did not get her name) said she would call pt and make an appt for f/u with neurology.  Will forward this message back to care coordination to see if they can ensure this happens.  Rehan Benz MD  [facility]  [phone]            Msg from Nov from [facility] team....      4:10 PM   Irlanda York, RN routed this conversation to [id number]   Irlanda York, RN          3:46 PM   Note      Informed patients wife, Sisi, that Dr. Fallon does not have any appointments available in December. Sisi states they will see any provider at the  of . They had a bad experience with [facility] and refuse to go back there. Will arrange an appointment with general neurology or Dr. Forrest. Message sent to scheduling to contact wife to make appointment. Wife verbalized understanding.

## 2019-01-08 ENCOUNTER — PATIENT OUTREACH (OUTPATIENT)
Dept: CARE COORDINATION | Facility: CLINIC | Age: 44
End: 2019-01-08

## 2019-01-08 NOTE — PROGRESS NOTES
Clinic Care Coordination Contact  Care Team Conversations    Clinic Care Coordinator RN left voicemail for Oscar York RN care coordination at Neurology.     Clinic Care Coordinator RN left voicemail for patient's wife Sisi.     From PCP visit 12/19/18  Vahe Haas is a 43 year old male who presents to clinic today for the following health issues:     S/p Stroke (vertebral artery dissection) update-  Work going okay- 6hrs/day until today- will increase to full time, 8hrs/day as of today.    Feels ready, though he is still sleepy/tired at home.  Sleeping a lot on weekends.  Sleeping 8hrs/nt during week, waking feeling rested.   Making it through work okay, though.  Not feeling overwhelmed.  Supported well at work.  Speech sx's s/p stroke is coming along- feels it's back to ~98%.  He's done with speech therapy- done with all therapies.  Typing- would do better if he looked, but usually hasn't had to look, so trying not to to practice.  Getting better.     Doing [place] puzzles, which is helping with word finding skills.  Doing well with Mondays, but Tuesdays are still tough.  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     Scheduling f/u with neurology as recommended at discharge has been an issue.   Pt thought he has f/u with neurology at the [place]- not scheduled per chart review.  At his discharge from the hospital, they were indirectly told to do the f/u appt at [facility], but his wife has not had good experiences there, so requested he be seen at the [place].  Per chart review, they looked at having him see a non-stroke specialist for initial f/u, which they agreed to, but it looks like that has not been scheduled?  He is also not sure that he has heard the results of his CT angiogram of head/neck from 12/6/18.     Short Term Disability update- pt states part of his leave has been approved, but part has not.  Will let us know if more paperwork is needed.       MDD- he feels like his sx's are  well controlled on the zoloft 100mg/d and wellbutrin XL 150mg/d.  No known se's.  PHQ-9 is 5 today.        BP elevation- Bp noted to be higher at his last appt at 144/83.  Today it is at 131/91.  This has not been an issue for him in the past- usually 120/70s.  Wt has gone up- just not moving much or exercising.  Parking lot to work is a walk.  They do have a work-out facility at work - he hasn't used it, but could.    He has a friend who does- can try and make him his artemio.  Diet- they eat out most of the time.  Will try and do more snacking on fruits/vegetables.        Problem list and histories reviewed & adjusted, as indicated.  Additional history: as documented            Patient Active Problem List   Diagnosis     Allergic rhinitis     Insomnia     CARDIOVASCULAR SCREENING; LDL GOAL LESS THAN 160     Moderate major depression     Drug-induced erectile dysfunction     JANA (obstructive sleep apnea)     Acute bilateral thoracic back pain     Acute bilateral low back pain without sciatica     Vertebral artery dissection (H)     Past Surgical History           Past Surgical History:   Procedure Laterality Date     HC TOOTH EXTRACTION W/FORCEP   2003          Social History            Tobacco Use     Smoking status: Never Smoker     Smokeless tobacco: Never Used   Substance Use Topics     Alcohol use: Yes       Alcohol/week: 0.0 oz       Comment: social, 0-2 times per week     Family History         Family History   Problem Relation Age of Onset     Hypertension Mother       Allergies Mother       Depression Mother       Lipids Mother       Musculoskeletal Disorder Mother       Osteoporosis Mother       Respiratory Mother           asthma     Allergies Father       Lipids Father       Respiratory Father           asthma     Breast Cancer Paternal Grandmother       Hypertension Maternal Grandmother       Cerebrovascular Disease Maternal Grandmother       Allergies Maternal Grandmother       Arthritis Maternal  Grandmother       Lipids Maternal Grandmother       Osteoporosis Maternal Grandmother       Hypertension Paternal Grandfather       Depression Paternal Grandfather       Gastrointestinal Disease Paternal Grandfather           ulcers     Lipids Paternal Grandfather       Allergies Sister       Respiratory Sister           asthma     Thyroid Disease Sister               Current Outpatient Prescriptions          Current Outpatient Medications   Medication Sig Dispense Refill     acetaminophen (TYLENOL) 325 MG tablet Take 2 tablets (650 mg) by mouth every 4 hours as needed for mild pain 100 tablet 0     ALLERGY SHOTS           aspirin 325 MG EC tablet Take 1 tablet (325 mg) by mouth daily 60 tablet 0     bisacodyl (DULCOLAX) 10 MG Suppository Place 1 suppository (10 mg) rectally daily as needed for constipation 5 suppository 1     buPROPion (WELLBUTRIN XL) 150 MG 24 hr tablet TAKE 1 TABLET (150 MG) BY MOUTH EVERY MORNING - NEEDS APPOINTMENT 90 tablet 1     order for DME AIRSENSE 10  7-12 CM/H20  NASAL WISP         senna-docusate (SENOKOT-S;PERICOLACE) 8.6-50 MG per tablet Take 1-2 tablets by mouth 2 times daily as needed for constipation 100 tablet 0     sertraline (ZOLOFT) 100 MG tablet Take 1 tablet (100 mg) by mouth daily 90 tablet 1              Allergies   Allergen Reactions     Seasonal Allergies                 Recent Labs   Lab Test 09/04/18  1040 09/03/18  0934 02/17/16  1202 02/13/13  1116   12/23/10  1442   A1C  --  5.6  --   --   --   --    *  --  124* 122   < >  --    HDL 37*  --  30* 29*   < >  --    TRIG 99  --  167* 98   < >  --    ALT  --  29  --   --   --   --    CR 1.01 1.04 1.02 1.03   < >  --    GFRESTIMATED 81 78 81 82   < >  --    GFRESTBLACK >90 >90 >90  African American GFR Calc    >90   < >  --    POTASSIUM 4.0 3.5 3.9 3.9   < >  --    TSH  --   --   --   --   --  1.19    < > = values in this interval not displayed.          BP Readings from Last 3 Encounters:   12/19/18 (!) 131/91  "  11/14/18 144/83   10/12/18 128/82         Wt Readings from Last 3 Encounters:   12/19/18 115.6 kg (254 lb 14.4 oz)   11/14/18 112 kg (247 lb)   10/12/18 111.4 kg (245 lb 8 oz)                        Labs reviewed in EPIC     Reviewed and updated as needed this visit by clinical staff  Tobacco  Allergies  Meds  Problems  Med Hx  Surg Hx  Fam Hx  Soc Hx         Reviewed and updated as needed this visit by Provider  Tobacco  Allergies  Meds  Problems  Med Hx  Surg Hx  Fam Hx          ROS:  Constitutional, HEENT, cardiovascular, pulmonary, gi and gu systems are negative, except as otherwise noted.     OBJECTIVE:      BP (!) 131/91   Pulse 78   Resp 16   Ht 1.842 m (6' 0.5\")   Wt 115.6 kg (254 lb 14.4 oz)   SpO2 100%   BMI 34.10 kg/m    Body mass index is 34.1 kg/m .  GENERAL APPEARANCE: healthy, alert and no distress     EYES: PERRL, sclera clear     HENT: nose and mouth without ulcers or lesions     NECK: no adenopathy, no asymmetry, masses, or scars and thyroid normal to palpation     RESP: lungs clear to auscultation - no rales, rhonchi or wheezes     CV: regular rates and rhythm, normal S1 S2, no S3 or S4 and no murmur, click or rub      Abdomen: soft, nontender, no HSM or masses and bowel sounds normal     Ext: warm, dry, no edema      Neuro: CN 2-12 grossly nl, fundi WNL bilaterally, UE and LE strength 5/5, nl sensation and DTR's.  Normal gait.       Psych: full range affect, normal speech and grooming, judgement and insight intact      Diagnostic Test Results:      Results for orders placed or performed during the hospital encounter of 12/06/18   CTA Head Neck with Contrast     Narrative     CT ANGIOGRAM OF THE HEAD AND NECK WITH CONTRAST  12/6/2018 4:31 PM      HISTORY: Needs follow up CTA head/neck in 3 months status post CVA per  neurology request. Vertebral artery dissection (H). Cerebrovascular  accident (CVA) due to other mechanism.     TECHNIQUE:  CT angiography with an injection of " 70mL Isovue-370 IV  with scans through the head and neck. Images were transferred to a  separate 3-D workstation where multiplanar reformations and 3-D images  were created. Estimates of carotid stenoses are made relative to the  distal internal carotid artery diameters except as noted. Radiation  dose for this scan was reduced using automated exposure control,  adjustment of the mA and/or kV according to patient size, or iterative  reconstruction technique.     COMPARISON: CTA 9/3/2018      CT HEAD FINDINGS: Left PCA distribution infarct involving the left  occipital lobe/medial temporal lobe has evolved decreased  swelling/edema. Right cerebellar infarcts have decreased in size and  attenuation compared to the prior exam.     CT ANGIOGRAM HEAD FINDINGS:     The intracranial vertebral arteries, basilar artery, and posterior  cerebral arteries are patent. The internal carotid arteries, anterior  cerebral arteries and middle cerebral arteries are patent. No  aneurysms or vascular malformations are identified. Dural venous  sinuses are unremarkable. Right transverse sinus is dominant.      CT ANGIOGRAM NECK FINDINGS:   The vertebral arteries are patent. The right vertebral artery has  recanalized. The common carotid arteries, internal carotid arteries,  and external carotid arteries are patent. No evidence of new  dissection, occlusion, or flow-limiting stenosis.     The bilateral tonsillar tissue is enlarged. Bilateral palatine  tonsilliths are present. There are mild degenerative changes  throughout the cervical spine with degenerative disc disease most  severe at C5-C6.         Impression     IMPRESSION: Right vertebral artery has recanalized. Patent arteries in  the head and neck. Expected evolution of left PCA and right cerebellar  infarcts.     GEORGETTE SCOTT MD         ASSESSMENT/PLAN:      (I77.74) Vertebral artery dissection (H)  (primary encounter diagnosis)  Comment: Sx's improving, now done with  therapies.  He is about to start back full-time at work (currently at 6 hrs/day)- feels mostly ready, though he's still tired on nights/weekends.  He does not think his FMLA for short-term disability is going through completely.  He also still does not have a f/u appt scheduled with neurology per chart review today.  It looks like someone at [facility] said he could f/u with a non-stroke specialist, and forwarded it to schedulers, but no follow-up after that point.  He has had a CTA neck/brain done through neurology on 12/6/18- pt unsure if he's heard the results of that study.  Will forward this note on to the provider who ordered the study, the person they last talked to at the [facility], and refer him to care coordination to ensure f/u is scheduled and completed.  Plan: CARE COORDINATION REFERRAL             (F33.1) Major depressive disorder, recurrent episode, moderate (H)  Comment: Stable despite stressors with no se's on current meds, will continue.  Refills sent.  Cont q6mo f/u.  Plan: sertraline (ZOLOFT) 100 MG tablet, buPROPion         (WELLBUTRIN XL) 150 MG 24 hr tablet             (R03.0) Elevated BP without diagnosis of hypertension  Comment: BP bit better today, but still boderline, especially with recent events.  Plan: Recommended more activity, healthier eating, less salt- recommended getting in fresh fruits and vegetables - as snacks if they are eating out for their meals.         Return in about 4 weeks (around 1/16/2019) for BP Recheck, follow-up vertebral artery dissection, mood.           Neida Benz MD  [facility]

## 2019-01-16 ENCOUNTER — OFFICE VISIT (OUTPATIENT)
Dept: FAMILY MEDICINE | Facility: CLINIC | Age: 44
End: 2019-01-16
Payer: COMMERCIAL

## 2019-01-16 VITALS
OXYGEN SATURATION: 98 % | SYSTOLIC BLOOD PRESSURE: 120 MMHG | BODY MASS INDEX: 33.72 KG/M2 | WEIGHT: 252.06 LBS | DIASTOLIC BLOOD PRESSURE: 76 MMHG | TEMPERATURE: 97.7 F | HEART RATE: 67 BPM

## 2019-01-16 DIAGNOSIS — F32.1 MODERATE MAJOR DEPRESSION (H): ICD-10-CM

## 2019-01-16 DIAGNOSIS — I77.74 VERTEBRAL ARTERY DISSECTION (H): Primary | ICD-10-CM

## 2019-01-16 PROCEDURE — 99214 OFFICE O/P EST MOD 30 MIN: CPT | Performed by: FAMILY MEDICINE

## 2019-01-16 ASSESSMENT — PAIN SCALES - GENERAL: PAINLEVEL: NO PAIN (0)

## 2019-01-16 ASSESSMENT — PATIENT HEALTH QUESTIONNAIRE - PHQ9: SUM OF ALL RESPONSES TO PHQ QUESTIONS 1-9: 6

## 2019-01-16 NOTE — PROGRESS NOTES
SUBJECTIVE:   Vahe Haas is a 43 year old male who presents to clinic today for the following health issues:  Here to follow up from 12/19/2018 visit- f/u back to work full-time after vertebral artery dissection, blood pressure, mood.    Depression Followup    Status since last visit: Stable  Some improvment    See PHQ-9 for current symptoms.  Other associated symptoms: None    Complicating factors:   Significant life event:  No   Current substance abuse:  None  Anxiety or Panic symptoms:  No    PHQ 9/14/2018 12/19/2018 1/16/2019   PHQ-9 Total Score 14 5 6   Q9: Suicide Ideation Not at all Not at all Not at all     Mood score he feels is high at '6' due to continued fatigue.  Feels fatigue is stable despite going back to work full-time around the time of his last visit, not worse, however.        Vertebral artery dissection f/u-   Neurology f/u- still not scheduled despite calling their office myself after last appt.   Care coordination involved, left msg with wife on 1/8/19, not returned, but a f/u call was not done from care coordination.  Neurology clinic reportedly left vmail msg with wife on 12/27 and 1/4/19, but pt was not aware of their calls.    Sx's currently-   Speech - good 95% of the time, certain words 'throw him for a loop', but for the most part speech is good.  Still doing cross-word puzzles.  Can almost do the Mondays now, though still takes him a long time.  Word-finding is much better than his speech.  Does better when he slows down, but he has a hard time doing that.    Weight gain-   Started in '15, pt states he did start new job then.  He was started on zoloft in '13.  They have a gym at their office.  Has a friend at work who goes to the gym, makes time, so pt feels he should be able to as well.  Diet- still eating out a bunch, not cooking much at home.  Often doesn't eat lunch.  Could bring fruits/vegetables/nuts to snack on during work.    Problem list and histories reviewed &  adjusted, as indicated.  Additional history: as documented      Patient Active Problem List   Diagnosis     Allergic rhinitis     Insomnia     CARDIOVASCULAR SCREENING; LDL GOAL LESS THAN 160     Moderate major depression     Drug-induced erectile dysfunction     JANA (obstructive sleep apnea)     Acute bilateral thoracic back pain     Acute bilateral low back pain without sciatica     Vertebral artery dissection (H)     Past Surgical History:   Procedure Laterality Date     HC TOOTH EXTRACTION W/FORCEP  2003       Social History     Tobacco Use     Smoking status: Never Smoker     Smokeless tobacco: Never Used   Substance Use Topics     Alcohol use: Yes     Alcohol/week: 0.0 oz     Comment: social, 0-2 times per week     Family History   Problem Relation Age of Onset     Hypertension Mother      Allergies Mother      Depression Mother      Lipids Mother      Musculoskeletal Disorder Mother      Osteoporosis Mother      Respiratory Mother         asthma     Allergies Father      Lipids Father      Respiratory Father         asthma     Breast Cancer Paternal Grandmother      Hypertension Maternal Grandmother      Cerebrovascular Disease Maternal Grandmother      Allergies Maternal Grandmother      Arthritis Maternal Grandmother      Lipids Maternal Grandmother      Osteoporosis Maternal Grandmother      Hypertension Paternal Grandfather      Depression Paternal Grandfather      Gastrointestinal Disease Paternal Grandfather         ulcers     Lipids Paternal Grandfather      Allergies Sister      Respiratory Sister         asthma     Thyroid Disease Sister          Current Outpatient Medications   Medication Sig Dispense Refill     aspirin 325 MG EC tablet Take 1 tablet (325 mg) by mouth daily 60 tablet 0     buPROPion (WELLBUTRIN XL) 150 MG 24 hr tablet TAKE 1 TABLET (150 MG) BY MOUTH EVERY MORNING - NEEDS APPOINTMENT 90 tablet 1     order for DME AIRSENSE 10  7-12 CM/H20  NASAL WISP       sertraline (ZOLOFT) 100 MG  tablet Take 1 tablet (100 mg) by mouth daily 90 tablet 1     acetaminophen (TYLENOL) 325 MG tablet Take 2 tablets (650 mg) by mouth every 4 hours as needed for mild pain (Patient not taking: Reported on 1/16/2019) 100 tablet 0     ALLERGY SHOTS        bisacodyl (DULCOLAX) 10 MG Suppository Place 1 suppository (10 mg) rectally daily as needed for constipation (Patient not taking: Reported on 1/16/2019) 5 suppository 1     senna-docusate (SENOKOT-S;PERICOLACE) 8.6-50 MG per tablet Take 1-2 tablets by mouth 2 times daily as needed for constipation (Patient not taking: Reported on 1/16/2019) 100 tablet 0     Allergies   Allergen Reactions     Seasonal Allergies      Recent Labs   Lab Test 09/04/18  1040 09/03/18  0934 02/17/16  1202 02/13/13  1116  12/23/10  1442   A1C  --  5.6  --   --   --   --    *  --  124* 122   < >  --    HDL 37*  --  30* 29*   < >  --    TRIG 99  --  167* 98   < >  --    ALT  --  29  --   --   --   --    CR 1.01 1.04 1.02 1.03   < >  --    GFRESTIMATED 81 78 81 82   < >  --    GFRESTBLACK >90 >90 >90   GFR Calc   >90   < >  --    POTASSIUM 4.0 3.5 3.9 3.9   < >  --    TSH  --   --   --   --   --  1.19    < > = values in this interval not displayed.      BP Readings from Last 3 Encounters:   01/16/19 120/76   12/19/18 (!) 131/91   11/14/18 144/83    Wt Readings from Last 3 Encounters:   01/16/19 114.3 kg (252 lb 1 oz)   12/19/18 115.6 kg (254 lb 14.4 oz)   11/14/18 112 kg (247 lb)           Labs reviewed in EPIC    Reviewed and updated as needed this visit by clinical staff  Tobacco  Allergies  Meds  Problems  Med Hx  Surg Hx  Fam Hx       Reviewed and updated as needed this visit by Provider  Tobacco  Allergies  Meds  Problems  Med Hx  Surg Hx  Fam Hx         ROS:  Constitutional, HEENT, cardiovascular, pulmonary, gi and gu systems are negative, except as otherwise noted.    OBJECTIVE:     /76   Pulse 67   Temp 97.7  F (36.5  C) (Oral)   Wt 114.3 kg  (252 lb 1 oz)   SpO2 98%   BMI 33.72 kg/m    Body mass index is 33.72 kg/m .  GENERAL APPEARANCE: healthy, alert and no distress     EYES: PERRL, sclera clear     HENT: nose and mouth without ulcers or lesions     NECK: no adenopathy, no asymmetry, masses, or scars and thyroid normal to palpation     RESP: lungs clear to auscultation - no rales, rhonchi or wheezes     CV: regular rates and rhythm, normal S1 S2, no S3 or S4 and no murmur, click or rub      Abdomen: soft, nontender, no HSM or masses and bowel sounds normal     Ext: warm, dry, no edema      Psych: full range affect, normal speech and grooming, judgement and insight intact      Neuro: CN 2-12 grossly nl, fundi WNL bilaterally, UE and LE strength 5/5, nl sensation and DTR's.  Normal gait.     Diagnostic Test Results:  Results for orders placed or performed during the hospital encounter of 12/06/18   CTA Head Neck with Contrast    Narrative    CT ANGIOGRAM OF THE HEAD AND NECK WITH CONTRAST  12/6/2018 4:31 PM     HISTORY: Needs follow up CTA head/neck in 3 months status post CVA per  neurology request. Vertebral artery dissection (H). Cerebrovascular  accident (CVA) due to other mechanism.    TECHNIQUE:  CT angiography with an injection of 70mL Isovue-370 IV  with scans through the head and neck. Images were transferred to a  separate 3-D workstation where multiplanar reformations and 3-D images  were created. Estimates of carotid stenoses are made relative to the  distal internal carotid artery diameters except as noted. Radiation  dose for this scan was reduced using automated exposure control,  adjustment of the mA and/or kV according to patient size, or iterative  reconstruction technique.    COMPARISON: CTA 9/3/2018     CT HEAD FINDINGS: Left PCA distribution infarct involving the left  occipital lobe/medial temporal lobe has evolved decreased  swelling/edema. Right cerebellar infarcts have decreased in size and  attenuation compared to the prior  exam.    CT ANGIOGRAM HEAD FINDINGS:     The intracranial vertebral arteries, basilar artery, and posterior  cerebral arteries are patent. The internal carotid arteries, anterior  cerebral arteries and middle cerebral arteries are patent. No  aneurysms or vascular malformations are identified. Dural venous  sinuses are unremarkable. Right transverse sinus is dominant.     CT ANGIOGRAM NECK FINDINGS:   The vertebral arteries are patent. The right vertebral artery has  recanalized. The common carotid arteries, internal carotid arteries,  and external carotid arteries are patent. No evidence of new  dissection, occlusion, or flow-limiting stenosis.    The bilateral tonsillar tissue is enlarged. Bilateral palatine  tonsilliths are present. There are mild degenerative changes  throughout the cervical spine with degenerative disc disease most  severe at C5-C6.       Impression    IMPRESSION: Right vertebral artery has recanalized. Patent arteries in  the head and neck. Expected evolution of left PCA and right cerebellar  infarcts.    GEORGETTE SCOTT MD       ASSESSMENT/PLAN:       ICD-10-CM    1. Vertebral artery dissection (H) I77.74    2. Moderate major depression F32.1      Vertebral artery dissection - despite calling neurology office myself (and long hold) after pt's last visit (who said they would call him to schedule an appt), and referring to care coordination to help him schedule, he is still not scheduled for the 3mo f/u from his 9/18 hospitalization.  He has not been contacted by neurology regarding his CTA of the head/neck on 12/6/18- reviewed findings with pt today, but really want the expertise from neurology for f/u plans.  Pt will call to schedule today- reminder printed out for him.  He will contact us if he has issues scheduling that appointment.  His wife's family had a bad experience at [facility], which is why they wanted to f/u at [facility].    BP recheck- much better today.  Work on  diet/execise/wt loss changes as discussed - last time and today.  Printed out to help him remember.    MDD- stable on current meds.    Follow-up in 4 wks- scheduled today.    Patient Instructions   --Higher blood pressure - better today, but lower (diastolic) number still higher on recheck (90s).  Really work on getting more fruits/vegetables/nuts as snacks, salad bar at work (with lots of extra vegetables and vinaigrette).  Try and work-out at gym at work- 2-3 times a week.    -Call and make the follow-up appointment with neurology TODAY    -Follow-up here in four weeks to follow-up on blood pressure, diet/exercise changes.      Return in about 4 weeks (around 2/13/2019) for BP Recheck, vertebral artery schedule f/u.      Neida Benz MD  [facility]

## 2019-01-16 NOTE — PATIENT INSTRUCTIONS
--Higher blood pressure - better today, but lower (diastolic) number still higher on recheck (90s).  Really work on getting more fruits/vegetables/nuts as snacks, salad bar at work (with lots of extra vegetables and vinaigrette).  Try and work-out at gym at work- 2-3 times a week.    -Call and make the follow-up appointment with neurology TODAY    -Follow-up here in four weeks to follow-up on blood pressure, diet/exercise changes.

## 2019-01-16 NOTE — NURSING NOTE
"Chief Complaint   Patient presents with     RECHECK     /76   Pulse 67   Temp 97.7  F (36.5  C) (Oral)   Wt 114.3 kg (252 lb 1 oz)   SpO2 98%   BMI 33.72 kg/m   Estimated body mass index is 33.72 kg/m  as calculated from the following:    Height as of 12/19/18: 1.842 m (6' 0.5\").    Weight as of this encounter: 114.3 kg (252 lb 1 oz).  bp completed using cuff size: large      Health Maintenance addressed:  NONE    n/a              "

## 2019-01-17 NOTE — TELEPHONE ENCOUNTER
RN CC outreached on 1/8/19 and left vm message for spouse. RN CC contacted Neurology who states they have tried x2 to reach spouse to schedule with no response. Patient was seen for follow-up appointment and notes indicate that a Neurology follow-up was able to be scheduled with Dr. Fallon on 2/12/19 at 9:00 AM.    Iris Escamilla, [facility]  Social Work Care Coordinator   [facility] [place]  [phone]

## 2019-01-31 ENCOUNTER — PATIENT OUTREACH (OUTPATIENT)
Dept: CARE COORDINATION | Facility: CLINIC | Age: 44
End: 2019-01-31

## 2019-01-31 NOTE — PROGRESS NOTES
Clinic Care Coordination Contact  Care Team Conversations      Patient saw PCP on 1/16/19 for follow up.   Patient had not scheduled follow up with neuro. (Neurology and Clinic Care Coordinator RN left voicemails with patient's wife to schedule appointment.  Patient reports he was not aware of voicemails.)  Patient is now scheduled to see Neurology 2/12/19.

## 2019-02-12 ENCOUNTER — OFFICE VISIT (OUTPATIENT)
Dept: NEUROLOGY | Facility: CLINIC | Age: 44
End: 2019-02-12
Payer: COMMERCIAL

## 2019-02-12 VITALS
BODY MASS INDEX: 33.74 KG/M2 | HEART RATE: 74 BPM | HEIGHT: 73 IN | SYSTOLIC BLOOD PRESSURE: 130 MMHG | DIASTOLIC BLOOD PRESSURE: 76 MMHG | RESPIRATION RATE: 16 BRPM | TEMPERATURE: 97.7 F | WEIGHT: 254.6 LBS | OXYGEN SATURATION: 96 %

## 2019-02-12 DIAGNOSIS — I77.74 VERTEBRAL ARTERY DISSECTION (H): Primary | ICD-10-CM

## 2019-02-12 ASSESSMENT — PAIN SCALES - GENERAL: PAINLEVEL: NO PAIN (0)

## 2019-02-12 ASSESSMENT — MIFFLIN-ST. JEOR: SCORE: 2101.12

## 2019-02-12 NOTE — LETTER
2/12/2019       RE: Vahe Haas  [address]  [facility] [address]     Dear Colleague,    Thank you for referring your patient, Vahe Haas, to the [facility] NEUROLOGY at [facility]. Please see a copy of my visit note below.    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    Patient presented to [facility] on 9/3/2018 with severe headache, nausea, intermittent confusion. Initial CT head without contrast did not show any acute abnormality, lumbar puncture was done which ruled out meningitis, CTA head and neck showed right vertebral artery occlusion secondary to dissection.  He was started on full-strength aspirin 325 mg and MRI brain was performed which showed bilateral posterior circulation infarcts, MR angiogram head and neck showed mild to moderate focal stenosis in the V3 segment of the right vertebral artery. Transthoracic echocardiogram was unremarkable.  He was then discharged home with outpatient PT OT.  He had a repeat CT angiogram head and neck in December 2018 which shows recanalization of right vertebral artery and patent arteries in the  head and neck.    Since discharge, patient has recovered well, he is able to return to work full-time. States that he notices mild decreased fine movements with his right upper extremity, has some trouble typing with both hands. He intermittently also has speech difficulty where he has minimal word finding difficulty and slow started speech.  Denies any headaches, no recent fevers or chills, no chest pain or shortness of breath, no abdominal pain nausea vomiting or diarrhea.  He has a history of grand mal seizures-had one seizure in the 20s and his last one was 2007 and he has been seizure-free since then and is not on any antiepileptic medications.  Of note he has a strong family history of seizures as well, but has epilepsy and his sister and a cousin who had severe epilepsy requiring corpus callosotomy.  Compliant with aspirin 325 mg daily.    The patient's medical, surgical, social, and family history were personally reviewed with the patient.  Past Medical History:   Diagnosis Date     Allergic rhinitis, cause unspecified     Allergic rhinitis     Insomnia     hard time getting to sleep     Moderate major depression (H) 2/24/2011     Other convulsions 1999    grand mal seizure after accident & concusion, also had until age 4, last sz around age 21      Past Surgical History:   Procedure Laterality Date     HC TOOTH EXTRACTION W/FORCEP  2003     Social History     Socioeconomic History     Marital status:      Spouse name: Not on file     Number of children: 0     Years of education: BA     Highest education level: Not on file   Social Needs     Financial resource strain: Not on file     Food insecurity - worry: Not on file     Food insecurity - inability: Not on file     Transportation needs - medical: Not on file     Transportation needs - non-medical: Not on file   Occupational History     Occupation:       Employer: SELF     Occupation: IT     Employer: [employer]      Comment: [employer]   Tobacco Use     Smoking status: Never Smoker     Smokeless tobacco: Never Used   Substance and Sexual Activity     Alcohol use: Yes     Alcohol/week: 0.0 oz     Comment: social, 0-2 times per week     Drug use: No     Sexual activity: Yes     Partners: Female     Birth control/protection: Pill   Other Topics Concern     Parent/sibling w/ CABG, MI or angioplasty before 65F 55M? No   Social History Narrative    Social Documentation:        Balanced Diet: Yes     Calcium intake: 1- 2 per day    Caffeine: 1 cup per day    Exercise:  type of activity bike to work and walk dogs;  6 times per week    Sunscreen: No    Seatbelts:  Yes    Self Breast Exam:  No - n/a    Self Testicular Exam: Yes    Physical/Emotional/Sexual Abuse: No     Do you feel safe in your environment? Yes        Cholesterol screen up to date: 2007    Eye Exam up to date: yes    Dental Exam up to date: Yes    Pap smear up to date: Does Not Apply    Mammogram up to date: Does Not Apply    Dexa Scan up to date: Does Not Apply    Colonoscopy up to date: Does Not Apply    Immunizations up to date: Yes-Td 2002    Glucose screen if over 40:  2007         Melissa Webber [facility]                      Family History   Problem Relation Age of Onset     Hypertension Mother      Allergies Mother      Depression Mother      Lipids Mother      Musculoskeletal Disorder Mother      Osteoporosis Mother      Respiratory Mother         asthma     Allergies Father      Lipids Father      Respiratory Father         asthma     Breast Cancer Paternal Grandmother      Hypertension Maternal Grandmother      Cerebrovascular Disease Maternal Grandmother      Allergies Maternal Grandmother      Arthritis Maternal Grandmother      Lipids Maternal Grandmother      Osteoporosis Maternal Grandmother      Hypertension Paternal Grandfather      Depression Paternal Grandfather      Gastrointestinal Disease Paternal Grandfather         ulcers     Lipids Paternal  "Grandfather      Allergies Sister      Respiratory Sister         asthma     Thyroid Disease Sister      Current Outpatient Medications   Medication     ALLERGY SHOTS     aspirin 325 MG EC tablet     buPROPion (WELLBUTRIN XL) 150 MG 24 hr tablet     order for DME     sertraline (ZOLOFT) 100 MG tablet     acetaminophen (TYLENOL) 325 MG tablet     bisacodyl (DULCOLAX) 10 MG Suppository     senna-docusate (SENOKOT-S;PERICOLACE) 8.6-50 MG per tablet     No current facility-administered medications for this visit.      Allergies   Allergen Reactions     Seasonal Allergies      Review of Systems:  14-point review of systems was completed. The pertinent positives and negatives are in the HPI, and the remainder was negative unless otherwise mentioned.    Physical Exam:  /76 (BP Location: Left arm, Patient Position: Sitting, Cuff Size: Adult Large)   Pulse 74   Temp 97.7  F (36.5  C) (Oral)   Resp 16   Ht 1.842 m (6' 0.52\")   Wt 115.5 kg (254 lb 9.6 oz)   SpO2 96%   BMI 34.04 kg/m      General:  Pleasant. Dressed appropriately for season. Sitting comfortably in chair.   Head:  Normocephalic, atraumatic  Eyes:  No conjunctival injection, no scleral icterus.   Mouth:  No oral lesions, no erythema or exudate in the oropharynx  Respiratory:  Non-labored breathing on room air. No accessory muscle use.  Cardiovascular:  Peripheral pulses intact. No carotid bruits.  Abdomen: soft  Extremities:  Warm, dry without peripheral edema  Neurologic:    Mental Status Exam:  Alert, awake. Fully oriented. Provides a thorough history.  Minimal dysarthria: Decreased fluency with intermittent stuttering speech, no aphasia  Cranial Nerves:  EOMs intact, horizontal nystagmus in the left end gaze, facial movements symmetric, facial sensation intact to light touch, hearing intact to conversation, palate and uvula rise symmetrically, no deviation in uvula or tongue, symmetric shoulder shrug, tongue midline and fully mobile.   Motor:  " Normal tone in all four extremities, no atrophy or fasciculations were seen. 5/5 strength bilaterally in shoulder abduction, elbow extensors and flexors, wrist extension, finger abduction, hip flexors, knee extensors and flexors. No tremors.  Sensory:  Sensation intact to light touch on arms and legs bilaterally. Negative Romberg.  Coordination:  Finger-nose-finger and heel to shin without dysmetria bilaterally.  Increased finger tapping in the right upper extremity compared to the left, mild dysdiadochokinesia with the right upper extremity  Reflexes: 3+ in the right upper extremity, 2+ elsewhere  Gait:  Normal gait; normal arm swing and stance  NIHSS: 0    Laboratory:  Lab Results   Component Value Date    WBC 7.5 09/03/2018    HGB 15.4 09/03/2018    HCT 43.5 09/03/2018     09/03/2018     09/04/2018    POTASSIUM 4.0 09/04/2018    CHLORIDE 109 09/04/2018    CO2 24 09/04/2018    BUN 16 09/04/2018    CR 1.01 09/04/2018    GLC 98 09/04/2018    TROPI <0.015 09/04/2018    AST 22 09/03/2018    ALT 29 09/03/2018    ALKPHOS 81 09/03/2018    BILITOTAL 0.3 09/03/2018     Imaging:  CTA head and neck 12/6/2018: Recanalization of right vertebral artery dissection/occlusion  MRI 9/3/2018: Multiple areas of restricted diffusion in bilateral cerebellum, left posterior temporal occipital region    Assessment:  Vahe Haas is a 43 year old male with a history of recent right vertebral artery dissection with occlusion and bilateral posterior circulation strokes secondary to a bout of strenuous coughing, remote history of seizures, anxiety presents to the vascular neurology outpatient clinic for a routine follow-up.  Overall patient has recovered very well with some intermittent dysarthria and word finding difficulty worse during episodes of stress and mild decrease in fine finger movements in the right upper extremity.  He continues to remain on aspirin 325 mg daily.    #Right vertebral artery dissection with  posterior circulation strokes s/p recanalization  -Continue aspirin 325 mg daily for 1 year   -Avoid strenuous activity  -Educated patient on warning signs and symptoms of stroke and to call 911 right away   -Follow-up in the stroke clinic as needed    Images, plan was discussed in detail with patient and patient's wife at bedside who expressed understanding and all questions were answered.  Case was seen and discussed with Dr. Vasyl Gee MD  Vascular Neurology fellow  Pager # [phone]    Again, thank you for allowing me to participate in the care of your patient.      Sincerely,    Alvin Fallon MD

## 2019-02-12 NOTE — PROGRESS NOTES
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    Patient presented to [facility] on 9/3/2018 with severe headache, nausea, intermittent confusion. Initial CT head without contrast did not show any acute abnormality, lumbar puncture was done which ruled out meningitis, CTA head and neck showed right vertebral artery occlusion secondary to dissection.  He was started on full-strength aspirin 325 mg and MRI brain was performed which showed bilateral posterior circulation infarcts, MR angiogram head and neck showed mild to moderate focal stenosis in the V3 segment of the right vertebral artery. Transthoracic echocardiogram was unremarkable.  He was then discharged home with outpatient PT OT.  He had a repeat CT angiogram head and neck in December 2018 which shows recanalization of right vertebral artery and patent arteries in the head and neck.    Since discharge, patient has recovered well, he is able to return to work full-time. States that he notices mild decreased fine movements with his right upper extremity, has some trouble typing with both hands. He intermittently also has speech difficulty where he  has minimal word finding difficulty and slow started speech.  Denies any headaches, no recent fevers or chills, no chest pain or shortness of breath, no abdominal pain nausea vomiting or diarrhea.  He has a history of grand mal seizures-had one seizure in the 20s and his last one was 2007 and he has been seizure-free since then and is not on any antiepileptic medications.  Of note he has a strong family history of seizures as well, but has epilepsy and his sister and a cousin who had severe epilepsy requiring corpus callosotomy.  Compliant with aspirin 325 mg daily.    The patient's medical, surgical, social, and family history were personally reviewed with the patient.  Past Medical History:   Diagnosis Date     Allergic rhinitis, cause unspecified     Allergic rhinitis     Insomnia     hard time getting to sleep     Moderate major depression (H) 2/24/2011     Other convulsions 1999    grand mal seizure after accident & concusion, also had until age 4, last sz around age 21      Past Surgical History:   Procedure Laterality Date     HC TOOTH EXTRACTION W/FORCEP  2003     Social History     Socioeconomic History     Marital status:      Spouse name: Not on file     Number of children: 0     Years of education: BA     Highest education level: Not on file   Social Needs     Financial resource strain: Not on file     Food insecurity - worry: Not on file     Food insecurity - inability: Not on file     Transportation needs - medical: Not on file     Transportation needs - non-medical: Not on file   Occupational History     Occupation:       Employer: SELF     Occupation: IT     Employer: [employer]     Comment: [employer]   Tobacco Use     Smoking status: Never Smoker     Smokeless tobacco: Never Used   Substance and Sexual Activity     Alcohol use: Yes     Alcohol/week: 0.0 oz     Comment: social, 0-2 times per week     Drug use: No     Sexual activity: Yes     Partners: Female      Birth control/protection: Pill   Other Topics Concern     Parent/sibling w/ CABG, MI or angioplasty before 65F 55M? No   Social History Narrative    Social Documentation:        Balanced Diet: Yes     Calcium intake: 1- 2 per day    Caffeine: 1 cup per day    Exercise:  type of activity bike to work and walk dogs;  6 times per week    Sunscreen: No    Seatbelts:  Yes    Self Breast Exam:  No - n/a    Self Testicular Exam: Yes    Physical/Emotional/Sexual Abuse: No     Do you feel safe in your environment? Yes        Cholesterol screen up to date: 2007    Eye Exam up to date: yes    Dental Exam up to date: Yes    Pap smear up to date: Does Not Apply    Mammogram up to date: Does Not Apply    Dexa Scan up to date: Does Not Apply    Colonoscopy up to date: Does Not Apply    Immunizations up to date: Yes-Td 2002    Glucose screen if over 40:  2007         Melissa Webber CMA                      Family History   Problem Relation Age of Onset     Hypertension Mother      Allergies Mother      Depression Mother      Lipids Mother      Musculoskeletal Disorder Mother      Osteoporosis Mother      Respiratory Mother         asthma     Allergies Father      Lipids Father      Respiratory Father         asthma     Breast Cancer Paternal Grandmother      Hypertension Maternal Grandmother      Cerebrovascular Disease Maternal Grandmother      Allergies Maternal Grandmother      Arthritis Maternal Grandmother      Lipids Maternal Grandmother      Osteoporosis Maternal Grandmother      Hypertension Paternal Grandfather      Depression Paternal Grandfather      Gastrointestinal Disease Paternal Grandfather         ulcers     Lipids Paternal Grandfather      Allergies Sister      Respiratory Sister         asthma     Thyroid Disease Sister      Current Outpatient Medications   Medication     ALLERGY SHOTS     aspirin 325 MG EC tablet     buPROPion (WELLBUTRIN XL) 150 MG 24 hr tablet     order for DME     sertraline (ZOLOFT) 100 MG  "tablet     acetaminophen (TYLENOL) 325 MG tablet     bisacodyl (DULCOLAX) 10 MG Suppository     senna-docusate (SENOKOT-S;PERICOLACE) 8.6-50 MG per tablet     No current facility-administered medications for this visit.      Allergies   Allergen Reactions     Seasonal Allergies      Review of Systems:  14-point review of systems was completed. The pertinent positives and negatives are in the HPI, and the remainder was negative unless otherwise mentioned.    Physical Exam:  /76 (BP Location: Left arm, Patient Position: Sitting, Cuff Size: Adult Large)   Pulse 74   Temp 97.7  F (36.5  C) (Oral)   Resp 16   Ht 1.842 m (6' 0.52\")   Wt 115.5 kg (254 lb 9.6 oz)   SpO2 96%   BMI 34.04 kg/m     General:  Pleasant. Dressed appropriately for season. Sitting comfortably in chair.   Head:  Normocephalic, atraumatic  Eyes:  No conjunctival injection, no scleral icterus.   Mouth:  No oral lesions, no erythema or exudate in the oropharynx  Respiratory:  Non-labored breathing on room air. No accessory muscle use.  Cardiovascular:  Peripheral pulses intact. No carotid bruits.  Abdomen: soft  Extremities:  Warm, dry without peripheral edema  Neurologic:    Mental Status Exam:  Alert, awake. Fully oriented. Provides a thorough history.  Minimal dysarthria: Decreased fluency with intermittent stuttering speech, no aphasia  Cranial Nerves:  EOMs intact, horizontal nystagmus in the left end gaze, facial movements symmetric, facial sensation intact to light touch, hearing intact to conversation, palate and uvula rise symmetrically, no deviation in uvula or tongue, symmetric shoulder shrug, tongue midline and fully mobile.   Motor:  Normal tone in all four extremities, no atrophy or fasciculations were seen. 5/5 strength bilaterally in shoulder abduction, elbow extensors and flexors, wrist extension, finger abduction, hip flexors, knee extensors and flexors. No tremors.  Sensory:  Sensation intact to light touch on arms and " legs bilaterally. Negative Romberg.  Coordination:  Finger-nose-finger and heel to shin without dysmetria bilaterally.  Increased finger tapping in the right upper extremity compared to the left, mild dysdiadochokinesia with the right upper extremity  Reflexes: 3+ in the right upper extremity, 2+ elsewhere  Gait:  Normal gait; normal arm swing and stance  NIHSS: 0    Laboratory:  Lab Results   Component Value Date    WBC 7.5 09/03/2018    HGB 15.4 09/03/2018    HCT 43.5 09/03/2018     09/03/2018     09/04/2018    POTASSIUM 4.0 09/04/2018    CHLORIDE 109 09/04/2018    CO2 24 09/04/2018    BUN 16 09/04/2018    CR 1.01 09/04/2018    GLC 98 09/04/2018    TROPI <0.015 09/04/2018    AST 22 09/03/2018    ALT 29 09/03/2018    ALKPHOS 81 09/03/2018    BILITOTAL 0.3 09/03/2018     Imaging:  CTA head and neck 12/6/2018: Recanalization of right vertebral artery dissection/occlusion  MRI 9/3/2018: Multiple areas of restricted diffusion in bilateral cerebellum, left posterior temporal occipital region    Assessment:  Vahe Haas is a 43 year old male with a history of recent right vertebral artery dissection with occlusion and bilateral posterior circulation strokes secondary to a bout of strenuous coughing, remote history of seizures, anxiety presents to the vascular neurology outpatient clinic for a routine follow-up.  Overall patient has recovered very well with some intermittent dysarthria and word finding difficulty worse during episodes of stress and mild decrease in fine finger movements in the right upper extremity.  He continues to remain on aspirin 325 mg daily.    #Right vertebral artery dissection with posterior circulation strokes s/p recanalization  -Continue aspirin 325 mg daily for 1 year   -Avoid strenuous activity  -Educated patient on warning signs and symptoms of stroke and to call 911 right away   -Follow-up in the stroke clinic as needed    Images, plan was discussed in detail with patient  and patient's wife at bedside who expressed understanding and all questions were answered.  Case was seen and discussed with Dr. Vasyl Gee MD  Vascular Neurology fellow  Pager # [phone]      VASCULAR NEUROLOGY ATTENDING ATTESTATION    I saw the patient Feb 12, 2019 with the stroke fellow.  I reviewed all laboratory studies and neuroimaging.  I discussed the plan with the fellow and agree with their note.    In summary, this is a 43 year old male who suffered posterior circulation stroke secondary to vertebral artery dissection.  He has recovered well.  Follow-up CTA shows healed vertebral artery.    Plan:  1. Continue Aspirin 325mg for one year, at that point could consider discontinuing  2. Avoid high velocity neck movement (chiropractic manipulation, neck cracking, etc)  3. Educated on signs and symptoms of stroke  4. Follow-up prn    Alvin Fallon MD, MS  Vascular Neurology

## 2019-02-12 NOTE — PATIENT INSTRUCTIONS
Stroke warning signs and symptoms - CALL 911 right away for:  - Sudden numbness or weakness in the face, arm or leg (often on one side of the body).  - Sudden confusion or trouble understanding what is going on.  - Sudden blurred or decreased vision in one or both eyes.  - Sudden trouble speaking, loss of balance, dizziness or problems with coordination.  - Sudden, severe headache for no reason.  - Fainting or seizures.  - Symptoms may go away then come back suddenly.

## 2019-02-13 ENCOUNTER — OFFICE VISIT (OUTPATIENT)
Dept: FAMILY MEDICINE | Facility: CLINIC | Age: 44
End: 2019-02-13
Payer: COMMERCIAL

## 2019-02-13 VITALS
SYSTOLIC BLOOD PRESSURE: 118 MMHG | HEIGHT: 77 IN | BODY MASS INDEX: 30.23 KG/M2 | RESPIRATION RATE: 16 BRPM | DIASTOLIC BLOOD PRESSURE: 83 MMHG | TEMPERATURE: 97.5 F | OXYGEN SATURATION: 98 % | HEART RATE: 74 BPM | WEIGHT: 256 LBS

## 2019-02-13 DIAGNOSIS — Z87.898 HISTORY OF SEIZURE: ICD-10-CM

## 2019-02-13 DIAGNOSIS — F33.1 MAJOR DEPRESSIVE DISORDER, RECURRENT EPISODE, MODERATE (H): ICD-10-CM

## 2019-02-13 DIAGNOSIS — I77.74 VERTEBRAL ARTERY DISSECTION (H): ICD-10-CM

## 2019-02-13 PROCEDURE — 99214 OFFICE O/P EST MOD 30 MIN: CPT | Performed by: FAMILY MEDICINE

## 2019-02-13 RX ORDER — BUPROPION HYDROCHLORIDE 150 MG/1
TABLET ORAL
Qty: 90 TABLET | Refills: 0 | Status: SHIPPED | OUTPATIENT
Start: 2019-02-13 | End: 2019-07-16

## 2019-02-13 ASSESSMENT — MIFFLIN-ST. JEOR: SCORE: 2178.59

## 2019-02-13 NOTE — NURSING NOTE
"Chief Complaint   Patient presents with     Hypertension     /83   Pulse 74   Temp 97.5  F (36.4  C) (Oral)   Resp 16   Ht 1.956 m (6' 5\")   Wt 116.1 kg (256 lb)   SpO2 98%   BMI 30.36 kg/m   Estimated body mass index is 30.36 kg/m  as calculated from the following:    Height as of this encounter: 1.956 m (6' 5\").    Weight as of this encounter: 116.1 kg (256 lb).  bp completed using cuff size: large       Health Maintenance addressed:  NONE    n/a    Nely Reno MA     "

## 2019-02-13 NOTE — PROGRESS NOTES
SUBJECTIVE:   Vahe Haas is a 43 year old male who presents to clinic today for the following health issues:  Vertebral artery dissection, MDD and BP recheck...    Blood pressure Follow-up    Outpatient blood pressures are being checked at home.  Results are 130/70.  Today 118/83.    Low Salt Diet: low salt    Amount of exercise or physical activity: None    Problems taking medications regularly: Yes,  side effects    Medication side effects: low sex drive     Diet: low salt and low fat/cholesterol    Mood- On zoloft 100mg/d and wellbutrin XL 150mg/d.  Wife notes that he is 'angrier than before'.  He thinks she's just 'bugging him more'.  She wants him to do certain things, asking 'why are you still at work'.  Working 40-45 hrs/wk at work.  Was doing more prior to stroke.  He feels like he's doing okay.  Mood feels fine to him, but unsure if he's more irritable with his wife or not.  He's not sure how he would assess a change in medication right now.  He's currently on zoloft 100mg/d and wellbutrin XL 150mg/d.  Would like to keep the wellbutrin XL at 150mg/d due to seizure history.  He isn't sure how he would assess a change in medication.    Vertebral artery dissection-   Neurosurgery f/u yesterday- first f/u since hospitalization.  See note below- from fellow.  He reports that they thought he was doing great.  Repeat scans in 12/18 looked very good compared to 9/18 per them per pt.    He theorized that the dissection was possibly due from the severe coughing (week prior to sx's).  Pt didn't have any of the other issues- no MVA, no bungy jumping, no roller coasters.  They recommended he stay on the ec asa 325mg/d, though he thought he     Speech and fine motor movements- he thinks it is going better.  Still doing the big [employer] crossword,  one this past Monday, just got a couple wrong.  Knows he should try and play guitar.  Typing at work- the R hand is still a bit off, by one key at times, can be in either  direction.  That's been a problem since the stroke.  Maybe it's gotten a bit better?  Typing in his password- that has gotten better- easier to quantitate because he does it all the time.  Better if he's looking at the keys, but he's intentionally trying to not look.  Writing emails is significantly easier than when he first went back to work.    Fatigue- still pretty tired.    Tired, but still some trouble falling asleep.  Has worked from home with the winter weather, which doesn't help his schedule, less walking, which he thinks is good for him.    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  Case was seen and discussed with Dr. Vasyl Gee MD  Vascular Neurology fellow  Pager # [phone]'    Problem list and histories reviewed & adjusted, as indicated.  Additional history: as documented      Patient Active Problem List   Diagnosis     Allergic rhinitis     Insomnia     CARDIOVASCULAR SCREENING; LDL GOAL LESS THAN 160     Moderate major depression     Drug-induced erectile dysfunction     JANA (obstructive sleep apnea)     Acute bilateral thoracic back pain     Acute bilateral low back pain without sciatica     Vertebral artery dissection (H)     History of seizure     Past Surgical History:   Procedure Laterality Date     HC TOOTH EXTRACTION W/FORCEP  2003       Social History     Tobacco Use     Smoking status: Never Smoker     Smokeless tobacco: Never Used   Substance Use Topics     Alcohol use: Yes     Alcohol/week: 0.0 oz     Comment: social, 0-2 times per week     Family  History   Problem Relation Age of Onset     Hypertension Mother      Allergies Mother      Depression Mother      Lipids Mother      Musculoskeletal Disorder Mother      Osteoporosis Mother      Respiratory Mother         asthma     Allergies Father      Lipids Father      Respiratory Father         asthma     Breast Cancer Paternal Grandmother      Hypertension Maternal Grandmother      Cerebrovascular Disease Maternal Grandmother      Allergies Maternal Grandmother      Arthritis Maternal Grandmother      Lipids Maternal Grandmother      Osteoporosis Maternal Grandmother      Hypertension Paternal Grandfather      Depression Paternal Grandfather      Gastrointestinal Disease Paternal Grandfather         ulcers     Lipids Paternal Grandfather      Allergies Sister      Respiratory Sister         asthma     Thyroid Disease Sister          Current Outpatient Medications   Medication Sig Dispense Refill     ALLERGY SHOTS        aspirin 325 MG EC tablet Take 1 tablet (325 mg) by mouth daily 60 tablet 0     buPROPion (WELLBUTRIN XL) 150 MG 24 hr tablet TAKE 1 TABLET (150 MG) BY MOUTH EVERY MORNING 90 tablet 0     order for DME AIRSENSE 10  7-12 CM/H20  NASAL WISP       sertraline (ZOLOFT) 100 MG tablet Take 1 tablet (100 mg) by mouth daily 90 tablet 1     acetaminophen (TYLENOL) 325 MG tablet Take 2 tablets (650 mg) by mouth every 4 hours as needed for mild pain (Patient not taking: Reported on 1/16/2019) 100 tablet 0     bisacodyl (DULCOLAX) 10 MG Suppository Place 1 suppository (10 mg) rectally daily as needed for constipation (Patient not taking: Reported on 1/16/2019) 5 suppository 1     senna-docusate (SENOKOT-S;PERICOLACE) 8.6-50 MG per tablet Take 1-2 tablets by mouth 2 times daily as needed for constipation (Patient not taking: Reported on 1/16/2019) 100 tablet 0     Allergies   Allergen Reactions     Seasonal Allergies      Recent Labs   Lab Test 09/04/18  1040 09/03/18  0934 02/17/16  1202 02/13/13  1116   "12/23/10  1442   A1C  --  5.6  --   --   --   --    *  --  124* 122   < >  --    HDL 37*  --  30* 29*   < >  --    TRIG 99  --  167* 98   < >  --    ALT  --  29  --   --   --   --    CR 1.01 1.04 1.02 1.03   < >  --    GFRESTIMATED 81 78 81 82   < >  --    GFRESTBLACK >90 >90 >90   GFR Calc   >90   < >  --    POTASSIUM 4.0 3.5 3.9 3.9   < >  --    TSH  --   --   --   --   --  1.19    < > = values in this interval not displayed.      BP Readings from Last 3 Encounters:   02/13/19 118/83   02/12/19 130/76   01/16/19 120/76    Wt Readings from Last 3 Encounters:   02/13/19 116.1 kg (256 lb)   02/12/19 115.5 kg (254 lb 9.6 oz)   01/16/19 114.3 kg (252 lb 1 oz)                  Labs reviewed in EPIC    Reviewed and updated as needed this visit by clinical staff  Tobacco  Allergies  Meds  Problems  Med Hx  Surg Hx  Fam Hx       Reviewed and updated as needed this visit by Provider  Tobacco  Allergies  Meds  Problems  Med Hx  Surg Hx  Fam Hx         ROS:  Constitutional, HEENT, cardiovascular, pulmonary, gi and gu systems are negative, except as otherwise noted.    OBJECTIVE:     /83   Pulse 74   Temp 97.5  F (36.4  C) (Oral)   Resp 16   Ht 1.956 m (6' 5\")   Wt 116.1 kg (256 lb)   SpO2 98%   BMI 30.36 kg/m    Body mass index is 30.36 kg/m .  GENERAL APPEARANCE: healthy, alert and no distress     EYES: PERRL, sclera clear     HENT: nose and mouth without ulcers or lesions     NECK: no adenopathy, no asymmetry, masses, or scars and thyroid normal to palpation     RESP: lungs clear to auscultation - no rales, rhonchi or wheezes     CV: regular rates and rhythm, normal S1 S2, no S3 or S4 and no murmur, click or rub      Abdomen: soft, nontender, no HSM or masses and bowel sounds normal     Ext: warm, dry, no edema      Neuro: CN 2-12 grossly nl, fundi WNL bilaterally, UE and LE strength 5/5, nl sensation and DTR's.  Normal gait.      Psych: full range affect, normal speech and " grooming, judgement and insight intact     Diagnostic Test Results:  Results for orders placed or performed during the hospital encounter of 12/06/18   CTA Head Neck with Contrast    Narrative    CT ANGIOGRAM OF THE HEAD AND NECK WITH CONTRAST  12/6/2018 4:31 PM     HISTORY: Needs follow up CTA head/neck in 3 months status post CVA per  neurology request. Vertebral artery dissection (H). Cerebrovascular  accident (CVA) due to other mechanism.    TECHNIQUE:  CT angiography with an injection of 70mL [id number]-370 IV  with scans through the head and neck. Images were transferred to a  separate 3-D workstation where multiplanar reformations and 3-D images  were created. Estimates of carotid stenoses are made relative to the  distal internal carotid artery diameters except as noted. Radiation  dose for this scan was reduced using automated exposure control,  adjustment of the mA and/or kV according to patient size, or iterative  reconstruction technique.    COMPARISON: CTA 9/3/2018     CT HEAD FINDINGS: Left PCA distribution infarct involving the left  occipital lobe/medial temporal lobe has evolved decreased  swelling/edema. Right cerebellar infarcts have decreased in size and  attenuation compared to the prior exam.    CT ANGIOGRAM HEAD FINDINGS:     The intracranial vertebral arteries, basilar artery, and posterior  cerebral arteries are patent. The internal carotid arteries, anterior  cerebral arteries and middle cerebral arteries are patent. No  aneurysms or vascular malformations are identified. Dural venous  sinuses are unremarkable. Right transverse sinus is dominant.     CT ANGIOGRAM NECK FINDINGS:   The vertebral arteries are patent. The right vertebral artery has  recanalized. The common carotid arteries, internal carotid arteries,  and external carotid arteries are patent. No evidence of new  dissection, occlusion, or flow-limiting stenosis.    The bilateral tonsillar tissue is enlarged. Bilateral  palatine  tonsilliths are present. There are mild degenerative changes  throughout the cervical spine with degenerative disc disease most  severe at C5-C6.       Impression    IMPRESSION: Right vertebral artery has recanalized. Patent arteries in  the head and neck. Expected evolution of left PCA and right cerebellar  infarcts.    GEORGETTE SCOTT MD       ASSESSMENT/PLAN:       ICD-10-CM    1. Vertebral artery dissection (H) I77.74    2. History of seizure Z87.898    3. Major depressive disorder, recurrent episode, moderate (H) F33.1 buPROPion (WELLBUTRIN XL) 150 MG 24 hr tablet     Vertebral artery dissection- still with mild residual sx's, but still finding a bit of improvement here and there.  Saw neurosurgery for hospital follow-up yesterday, and they were encouraged by his progress.  Recommended he take aspirin 325mg/d for a year- then can stop.  PRN f/u.    MDD, h/o seizure- pt feels his mood is doing fine/good on wellbutrin XL 150mg/d and zoloft 100mg/d.  No known se's.  He reports his wife has some concerns about his irritability, but he feels he's reacting to her being protective.  Discussed options, would avoid going up on his wellbutrin with his neuro h/o (also with remote h/o seizures), could consider zoloft increase, but only if he's feeling worse.  Can also bring wife to next appt if she'd like to discuss his mood.    BP looks good today on check.  Will continue to monitor.    Return in about 4 months (around 6/13/2019) for Depression follow-up - sooner if any concerns or worsening symptoms.    Neida Benz MD  [facility]

## 2019-02-20 ENCOUNTER — PATIENT OUTREACH (OUTPATIENT)
Dept: CARE COORDINATION | Facility: CLINIC | Age: 44
End: 2019-02-20

## 2019-02-20 NOTE — PROGRESS NOTES
Clinic Care Coordination Contact  [facility]/Voicemail       Clinical Data: Care Coordinator Outreach  Patient followed up with neuro on 2/12/19.   From visit:   Assessment:  Vahe Haas is a 43 year old male with a history of recent right vertebral artery dissection with occlusion and bilateral posterior circulation strokes secondary to a bout of strenuous coughing, remote history of seizures, anxiety presents to the vascular neurology outpatient clinic for a routine follow-up.  Overall patient has recovered very well with some intermittent dysarthria and word finding difficulty worse during episodes of stress and mild decrease in fine finger movements in the right upper extremity.  He continues to remain on aspirin 325 mg daily.     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  Left message on voicemail with call back information and requested return call.  Plan:  Care Coordinator will try to reach patient again in 3-5 business days.

## 2019-04-30 ENCOUNTER — PATIENT OUTREACH (OUTPATIENT)
Dept: CARE COORDINATION | Facility: CLINIC | Age: 44
End: 2019-04-30

## 2019-04-30 NOTE — PROGRESS NOTES
Clinic Care Coordination Contact  [facility]/Voicemail       Clinical Data: Care Coordinator Outreach  Outreach attempted.  Left message on voicemail with call back information and requested return call.  Plan:  Care Coordinator will try to reach patient again in 7-10 business days.

## 2019-05-16 ENCOUNTER — PATIENT OUTREACH (OUTPATIENT)
Dept: CARE COORDINATION | Facility: CLINIC | Age: 44
End: 2019-05-16

## 2019-07-16 ENCOUNTER — OFFICE VISIT (OUTPATIENT)
Dept: FAMILY MEDICINE | Facility: CLINIC | Age: 44
End: 2019-07-16
Payer: COMMERCIAL

## 2019-07-16 VITALS
HEIGHT: 77 IN | BODY MASS INDEX: 28.27 KG/M2 | OXYGEN SATURATION: 98 % | TEMPERATURE: 97.9 F | WEIGHT: 239.4 LBS | HEART RATE: 84 BPM | DIASTOLIC BLOOD PRESSURE: 82 MMHG | SYSTOLIC BLOOD PRESSURE: 121 MMHG

## 2019-07-16 DIAGNOSIS — Z13.6 CARDIOVASCULAR SCREENING; LDL GOAL LESS THAN 160: ICD-10-CM

## 2019-07-16 DIAGNOSIS — Z23 NEED FOR TDAP VACCINATION: ICD-10-CM

## 2019-07-16 DIAGNOSIS — Z11.4 ENCOUNTER FOR SCREENING FOR HIV: ICD-10-CM

## 2019-07-16 DIAGNOSIS — F33.1 MDD (MAJOR DEPRESSIVE DISORDER), RECURRENT EPISODE, MODERATE (H): ICD-10-CM

## 2019-07-16 DIAGNOSIS — I77.74 VERTEBRAL ARTERY DISSECTION (H): Primary | ICD-10-CM

## 2019-07-16 LAB
ANION GAP SERPL CALCULATED.3IONS-SCNC: 5 MMOL/L (ref 3–14)
BUN SERPL-MCNC: 16 MG/DL (ref 7–30)
CALCIUM SERPL-MCNC: 8.7 MG/DL (ref 8.5–10.1)
CHLORIDE SERPL-SCNC: 111 MMOL/L (ref 94–109)
CHOLEST SERPL-MCNC: 184 MG/DL
CO2 SERPL-SCNC: 25 MMOL/L (ref 20–32)
CREAT SERPL-MCNC: 1.2 MG/DL (ref 0.66–1.25)
GFR SERPL CREATININE-BSD FRML MDRD: 74 ML/MIN/{1.73_M2}
GLUCOSE SERPL-MCNC: 98 MG/DL (ref 70–99)
HDLC SERPL-MCNC: 26 MG/DL
HIV 1+2 AB+HIV1 P24 AG SERPL QL IA: NONREACTIVE
LDLC SERPL CALC-MCNC: 124 MG/DL
NONHDLC SERPL-MCNC: 158 MG/DL
POTASSIUM SERPL-SCNC: 4.1 MMOL/L (ref 3.4–5.3)
SODIUM SERPL-SCNC: 141 MMOL/L (ref 133–144)
TRIGL SERPL-MCNC: 168 MG/DL

## 2019-07-16 PROCEDURE — 36415 COLL VENOUS BLD VENIPUNCTURE: CPT | Performed by: FAMILY MEDICINE

## 2019-07-16 PROCEDURE — 80048 BASIC METABOLIC PNL TOTAL CA: CPT | Performed by: FAMILY MEDICINE

## 2019-07-16 PROCEDURE — 99214 OFFICE O/P EST MOD 30 MIN: CPT | Mod: 25 | Performed by: FAMILY MEDICINE

## 2019-07-16 PROCEDURE — 80061 LIPID PANEL: CPT | Performed by: FAMILY MEDICINE

## 2019-07-16 PROCEDURE — 90715 TDAP VACCINE 7 YRS/> IM: CPT | Performed by: FAMILY MEDICINE

## 2019-07-16 PROCEDURE — 90471 IMMUNIZATION ADMIN: CPT | Performed by: FAMILY MEDICINE

## 2019-07-16 PROCEDURE — 87389 HIV-1 AG W/HIV-1&-2 AB AG IA: CPT | Performed by: FAMILY MEDICINE

## 2019-07-16 RX ORDER — BUPROPION HYDROCHLORIDE 150 MG/1
TABLET ORAL
Qty: 90 TABLET | Refills: 1 | Status: SHIPPED | OUTPATIENT
Start: 2019-07-16 | End: 2020-03-18

## 2019-07-16 RX ORDER — SERTRALINE HYDROCHLORIDE 100 MG/1
100 TABLET, FILM COATED ORAL DAILY
Qty: 90 TABLET | Refills: 1 | Status: SHIPPED | OUTPATIENT
Start: 2019-07-16 | End: 2020-02-03

## 2019-07-16 ASSESSMENT — MIFFLIN-ST. JEOR: SCORE: 2103.29

## 2019-07-16 ASSESSMENT — PATIENT HEALTH QUESTIONNAIRE - PHQ9: SUM OF ALL RESPONSES TO PHQ QUESTIONS 1-9: 9

## 2019-07-16 NOTE — NURSING NOTE
"Chief Complaint   Patient presents with     Depression     recheck     Hypertension     f/u     /82   Pulse 84   Temp 97.9  F (36.6  C) (Oral)   Ht 1.956 m (6' 5\")   Wt 108.6 kg (239 lb 6.4 oz)   SpO2 98%   BMI 28.39 kg/m   Estimated body mass index is 28.39 kg/m  as calculated from the following:    Height as of this encounter: 1.956 m (6' 5\").    Weight as of this encounter: 108.6 kg (239 lb 6.4 oz).        Health Maintenance due pending provider review:  PHQ9    PHQ/ACT/TERRA--Gave pt questionnare    Kemi Chavez CMA  "

## 2019-07-16 NOTE — PROGRESS NOTES
Subjective   Vahe Haas is a 43 year old male who presents to clinic today for the following health issues:    HPI   Depression Followup    How are you doing with your depression since your last visit? Same/stable    Are you having other symptoms that might be associated with depression? Yes:  energy issues--getting up and going/motivation    Have you had a significant life event?  OTHER: stroke     Are you feeling anxious or having panic attacks?   No    Do you have any concerns with your use of alcohol or other drugs? No    Vahe reports he has been fatigued since his stroke and it is difficult to separate the PHQ9 depression symptoms and symptoms from his stroke. He reports that his little pleasure or interest in doing things is related to his fatigue.  Patient has been out of Bupropion 150 mg since Sunday but will  medication today. Is taking Zoloft 100 mg daily. Patient reports only side effect of the medication is sexual dysfunction- ED. He is aware that Bupropion lowers the seizure threshold and he has a history of seizures- denies any seizures.  He is not currently seeing a counselor and does not feel this would help.  Reports no anxiety.         Social History     Tobacco Use     Smoking status: Never Smoker     Smokeless tobacco: Never Used   Substance Use Topics     Alcohol use: Yes     Alcohol/week: 0.0 oz     Comment: social, 0-2 times per week     Drug use: No     PHQ 12/19/2018 1/16/2019 7/16/2019   PHQ-9 Total Score 5 6 9   Q9: Thoughts of better off dead/self-harm past 2 weeks Not at all Not at all Not at all     No flowsheet data found.    Suicide Assessment Five-step Evaluation and Treatment (SAFE-T)    Amount of exercise or physical activity: None    Problems taking medications regularly: No    Medication side effects: ED    Diet: regular (no restrictions), rarely eats red meat        Vertebral Artery Dissection- 9/3/2018. Followed with Neurology on 2/12/18 and they recommended follow  "up prn. Patient reports having some persistent trouble talking as fast or as clearly as he would like. His brain has always moved faster than he can speak but this is definitely exacerbated after the stroke. He has been fatigued since the dissection as well. Patient states his right hand fine motor skills are slower than his left- for instance when typing he will have trouble since his right hand does not move as fast and when driving his manual he notices shifting is sometimes difficult. Vahe feels his gait is not a problem, but does notice he will sometimes veer to the right.  Overall he feels his sx's have been improving over time after the dissection, but he does feel like he is more overwhelmed by activities recently.  He also thinks his ability to multitask is also a bit worse, but it's difficult to say as he's also trying to push himself a bit more.  Used to just go home and sleep after work, now trying to do more than just rest outside of work.      Therapies after dissection- OT and speech.  \"Completed\" both treatment plans.    Current Outpatient Medications   Medication Sig Dispense Refill     acetaminophen (TYLENOL) 325 MG tablet Take 2 tablets (650 mg) by mouth every 4 hours as needed for mild pain 100 tablet 0     ALLERGY SHOTS        aspirin 325 MG EC tablet Take 1 tablet (325 mg) by mouth daily 60 tablet 0     buPROPion (WELLBUTRIN XL) 150 MG 24 hr tablet TAKE 1 TABLET (150 MG) BY MOUTH EVERY MORNING 90 tablet 1     order for DME AIRSENSE 10  7-12 CM/H20  NASAL WISP       sertraline (ZOLOFT) 100 MG tablet Take 1 tablet (100 mg) by mouth daily 90 tablet 1     bisacodyl (DULCOLAX) 10 MG Suppository Place 1 suppository (10 mg) rectally daily as needed for constipation (Patient not taking: Reported on 1/16/2019) 5 suppository 1     senna-docusate (SENOKOT-S;PERICOLACE) 8.6-50 MG per tablet Take 1-2 tablets by mouth 2 times daily as needed for constipation (Patient not taking: Reported on 1/16/2019) 100 " "tablet 0     Allergies   Allergen Reactions     Seasonal Allergies        Reviewed and updated as needed this visit by Provider  Tobacco  Allergies  Meds  Problems  Med Hx  Surg Hx  Fam Hx         Review of Systems   10 point ROS of systems including Constitutional, Eyes, Respiratory, Cardiovascular, Gastroenterology, Genitourinary, Integumentary, Muscularskeletal, Psychiatric were all negative except for pertinent positives noted in my HPI.          Objective    /82   Pulse 84   Temp 97.9  F (36.6  C) (Oral)   Ht 1.956 m (6' 5\")   Wt 108.6 kg (239 lb 6.4 oz)   SpO2 98%   BMI 28.39 kg/m    Body mass index is 28.39 kg/m .  Physical Exam   GENERAL: healthy, alert and no distress  GENERAL: fatigued- patient yawning multiple times during interview  EYES: Eyes grossly normal to inspection, PERRL and conjunctivae and sclerae normal  HENT: ear canals and TM's normal, nose and mouth without ulcers or lesions  NECK: no adenopathy, no asymmetry, masses, or scars and thyroid normal to palpation  RESP: lungs clear to auscultation - no rales, rhonchi or wheezes  CV: regular rate and rhythm, normal S1 S2, no S3 or S4, no murmur, click or rub, no peripheral edema and peripheral pulses strong  MS: no gross musculoskeletal defects noted, no edema  NEURO: Normal strength and tone, sensory exam grossly normal, mentation intact, speech mildly slow but clear, cranial nerves 2-12 intact, DTR's normal and symmetric, gait normal including heel/toe/tandem walking, Romberg normal and rapid alternating movements abnormal with right hand slower than left hand  PSYCH: mentation appears normal, affect normal/bright  LYMPH: no cervical adenopathy          Assessment & Plan       ICD-10-CM    1. Vertebral artery dissection (H) I77.74 OCCUPATIONAL THERAPY REFERRAL     Lipid panel reflex to direct LDL Fasting     Basic metabolic panel  (Ca, Cl, CO2, Creat, Gluc, K, Na, BUN)   2. MDD (major depressive disorder), recurrent episode, " moderate (H) F33.1 buPROPion (WELLBUTRIN XL) 150 MG 24 hr tablet     sertraline (ZOLOFT) 100 MG tablet   3. Need for Tdap vaccination Z23 TDAP, IM (10 - 64 YRS) - Adacel   4. Encounter for screening for HIV Z11.4 HIV Antigen Antibody Combo   5. CARDIOVASCULAR SCREENING; LDL GOAL LESS THAN 160 Z13.6 Lipid panel reflex to direct LDL Fasting     MDD: Patient feels this is doing well overall and that the PHQ9 score is effected by his stroke- increased fatigue. He is taking his sertraline 100 mg and bupropion  mg. He does continue to have sexual dysfunction side effects- ED. No changes were made today.  Cont q6mo f/u.    Vertebral Artery Dissection- Patient does have trouble with fine motor movements mostly in his right hand. He does feel these are improving. Had follow up with neurology on 2/12 and  Was told to follow up prn. The aspirin 325 mg - discussed.  Neurology said he could continue on it for a year, and then good stop.  He has the bottle to finish at home, and has not had any se's, so will finish up the bottle. Discussed with patient that he may benefit from more occupational therapy- referral was placed.    Patient is fasting today- lipid panel ordered. HIV screening was added.  Tdap vaccine given today.  Risks/benefits discussed.    Return in about 6 months (around 1/16/2020) for Routine Visit/Chronic Cares, Physical Exam, Depression follow-up.       Neida Benz MD on 7/16/2019 at 10:32 AM  The medical student has acted as my scribe.  I have completed all components of the history, physical exam and assessment and plan and have adjusted the note as needed with my own documention.    Neida Benz MD  [facility]

## 2019-10-02 ENCOUNTER — HEALTH MAINTENANCE LETTER (OUTPATIENT)
Age: 44
End: 2019-10-02

## 2019-11-06 DIAGNOSIS — G47.33 OBSTRUCTIVE SLEEP APNEA (ADULT) (PEDIATRIC): Primary | ICD-10-CM

## 2020-01-31 DIAGNOSIS — F33.1 MDD (MAJOR DEPRESSIVE DISORDER), RECURRENT EPISODE, MODERATE (H): ICD-10-CM

## 2020-02-03 RX ORDER — SERTRALINE HYDROCHLORIDE 100 MG/1
TABLET, FILM COATED ORAL
Qty: 30 TABLET | Refills: 0 | Status: SHIPPED | OUTPATIENT
Start: 2020-02-03 | End: 2020-03-18

## 2020-02-03 NOTE — TELEPHONE ENCOUNTER
"Zoloft 100MG   Last Written Prescription Date:  07/16/2019  Last Fill Quantity: 90,  # refills: 1   Last office visit: 7/16/2019 with prescribing provider:  Yes    Future Office Visit:      Requested Prescriptions   Pending Prescriptions Disp Refills     sertraline (ZOLOFT) 100 MG tablet [Pharmacy Med Name: Sertraline HCl Oral Tablet 100 MG] 30 tablet 0     Sig: TAKE ONE TABLET BY MOUTH ONE TIME DAILY       SSRIs Protocol Failed - 1/31/2020  4:33 PM        Failed - PHQ-9 score less than 5 in past 6 months     Please review last PHQ-9 score.           Failed - Recent (6 mo) or future (30 days) visit within the authorizing provider's specialty     Patient had office visit in the last 6 months or has a visit in the next 30 days with authorizing provider or within the authorizing provider's specialty.  See \"Patient Info\" tab in inbasket, or \"Choose Columns\" in Meds & Orders section of the refill encounter.            Passed - Medication is active on med list        Passed - Patient is age 18 or older          "

## 2020-02-03 NOTE — TELEPHONE ENCOUNTER
Medication is being filled for 1 time refill only due to:  Patient needs to be seen because due for 6 month f/u .   Shu ALEXANDER RN

## 2020-03-18 ENCOUNTER — VIRTUAL VISIT (OUTPATIENT)
Dept: FAMILY MEDICINE | Facility: CLINIC | Age: 45
End: 2020-03-18
Payer: COMMERCIAL

## 2020-03-18 ENCOUNTER — TELEPHONE (OUTPATIENT)
Dept: FAMILY MEDICINE | Facility: CLINIC | Age: 45
End: 2020-03-18

## 2020-03-18 DIAGNOSIS — R68.82 DECREASED LIBIDO: ICD-10-CM

## 2020-03-18 DIAGNOSIS — F33.1 MDD (MAJOR DEPRESSIVE DISORDER), RECURRENT EPISODE, MODERATE (H): Primary | ICD-10-CM

## 2020-03-18 DIAGNOSIS — I77.74 VERTEBRAL ARTERY DISSECTION (H): ICD-10-CM

## 2020-03-18 DIAGNOSIS — R45.4 IRRITABILITY: ICD-10-CM

## 2020-03-18 PROCEDURE — 99442 ZZC PHYSICIAN TELEPHONE EVALUATION 11-20 MIN: CPT | Performed by: FAMILY MEDICINE

## 2020-03-18 RX ORDER — BUPROPION HYDROCHLORIDE 150 MG/1
TABLET ORAL
Qty: 60 TABLET | Refills: 0 | Status: SHIPPED | OUTPATIENT
Start: 2020-03-18 | End: 2020-05-27

## 2020-03-18 ASSESSMENT — ANXIETY QUESTIONNAIRES
5. BEING SO RESTLESS THAT IT IS HARD TO SIT STILL: NOT AT ALL
GAD7 TOTAL SCORE: 1
7. FEELING AFRAID AS IF SOMETHING AWFUL MIGHT HAPPEN: NOT AT ALL
IF YOU CHECKED OFF ANY PROBLEMS ON THIS QUESTIONNAIRE, HOW DIFFICULT HAVE THESE PROBLEMS MADE IT FOR YOU TO DO YOUR WORK, TAKE CARE OF THINGS AT HOME, OR GET ALONG WITH OTHER PEOPLE: NOT DIFFICULT AT ALL
3. WORRYING TOO MUCH ABOUT DIFFERENT THINGS: NOT AT ALL
1. FEELING NERVOUS, ANXIOUS, OR ON EDGE: NOT AT ALL
2. NOT BEING ABLE TO STOP OR CONTROL WORRYING: NOT AT ALL
6. BECOMING EASILY ANNOYED OR IRRITABLE: SEVERAL DAYS

## 2020-03-18 ASSESSMENT — PATIENT HEALTH QUESTIONNAIRE - PHQ9
SUM OF ALL RESPONSES TO PHQ QUESTIONS 1-9: 7
5. POOR APPETITE OR OVEREATING: NOT AT ALL

## 2020-03-18 NOTE — PROGRESS NOTES
"Vahe Haas is a 44 year old male who is being evaluated via a telephone visit.      The patient has been notified of following (by M.A) Odilon Cox ma       \"We have found that certain health care needs can be provided without the need for a physical exam.  This service lets us provide the care you need with a short phone conversation.  If a prescription is necessary we can send it directly to your pharmacy.  If lab work is needed we can place an order for that and you can then stop by our lab to have the test done at a later time.    Since this is like an office visit,  will bill your insurance company for this service.  Please check with your medical insurance if this type of telephone/virtual is covered . You may be responsible for the cost of this service if insurance coverage is denied.  The typical cost is $30 (10min), $59(11-20min) and $85 (21-30min)     If during the course of the call the physician/provider feels a telephone visit is not appropriate, you will not be charged for this service\"    Consent has been obtained for this service by care team member: yes.  See the scanned image in the medical record.    S: The history as provided by the patient     MDD sx's- bit more angry lately, so sx's are a bit worse than the PHQ indicates.  Wife notices more than pt.     Still has se's of sexual dysfunction- has tried a couple medications, but they haven't seemed to help.  Mostly an issue with libido.        Assessment/Plan:    ICD-10-CM    1. MDD (major depressive disorder), recurrent episode, moderate (H)  F33.1 buPROPion (WELLBUTRIN XL) 150 MG 24 hr tablet     sertraline (ZOLOFT) 50 MG tablet   2. Irritability  R45.4    3. Decreased libido  R68.82    4. Vertebral artery dissection (H)  I77.74 buPROPion (WELLBUTRIN XL) 150 MG 24 hr tablet     sertraline (ZOLOFT) 50 MG tablet      MDD/Irritability/Low libido-  Pt on combination of zoloft and wellbutrin XL for several yrs now.  Wife concerned about his " irritability, and both are concerned about low libido.  ED meds x 2 have not helped libido/ED issues.  Discussed options - can try off one of the meds to see if libido issues improve off zoloft, and if a change in either helps/hurts irritability (will need his wife's input).  Pt would like to try going off zoloft now- will have him wean down and off over the next couple weeks- see zoloft sig.  F/u by phone in 2 months.  #60 day supple sent in of wellbutrin XL at same dose.  Call in earlier if concerning/worsening sx's.  Pt agrees with plan.    H/o Vertebral artery dissection- pt last saw neurovascular specialists a year ago in 2/19.  F/u prn.  They recommended he stop the asa in a year, so he did stop about a year ago.  They did not have concerns about his wellbutrin use.        Total time of call between patient and provider was 15 minutes     Rehan Benz MD  [facility]  [phone]    ===================================================

## 2020-03-18 NOTE — TELEPHONE ENCOUNTER
Left vm for patient to call back   Please  Schedule TE  In 2 months to follow up with CW  Violeta Iqbal CMA on 3/18/2020 at 11:19 AM

## 2020-03-18 NOTE — PATIENT INSTRUCTIONS
Plan for mood/irritability/low libido-  Continue wellbutrin XL 150mg/day.  Wean down and off the zoloft.  Take 50mg daily for 1 week, then 25mg (1/2 tab) for one week, then stop.    Make phone follow-up appointment for 2 months out (call for earlier appointment if worsening symptoms).

## 2020-03-19 ASSESSMENT — ANXIETY QUESTIONNAIRES: GAD7 TOTAL SCORE: 1

## 2020-03-23 DIAGNOSIS — I77.74 VERTEBRAL ARTERY DISSECTION (H): ICD-10-CM

## 2020-03-23 DIAGNOSIS — F33.1 MDD (MAJOR DEPRESSIVE DISORDER), RECURRENT EPISODE, MODERATE (H): ICD-10-CM

## 2020-03-24 NOTE — TELEPHONE ENCOUNTER
"Sertraline 50MG   Last Written Prescription Date:  03/18/2020  Last Fill Quantity: 10,  # refills: 0   Last office visit: 3/18/2020 with prescribing provider:  Yes , Virtual visit    Future Office Visit:      Requested Prescriptions   Pending Prescriptions Disp Refills     sertraline (ZOLOFT) 50 MG tablet 10 tablet 0     Sig: Take 1 tablet (50 mg) by mouth daily for one week, then take 1/2 tab (25mg) for one week, then stop       SSRIs Protocol Failed - 3/23/2020  3:54 PM        Failed - PHQ-9 score less than 5 in past 6 months     Please review last PHQ-9 score.           Passed - Medication is active on med list        Passed - Patient is age 18 or older        Passed - Recent (6 mo) or future (30 days) visit within the authorizing provider's specialty     Patient had office visit in the last 6 months or has a visit in the next 30 days with authorizing provider or within the authorizing provider's specialty.  See \"Patient Info\" tab in inbasket, or \"Choose Columns\" in Meds & Orders section of the refill encounter.                 "

## 2020-06-02 ENCOUNTER — VIRTUAL VISIT (OUTPATIENT)
Dept: FAMILY MEDICINE | Facility: CLINIC | Age: 45
End: 2020-06-02
Payer: COMMERCIAL

## 2020-06-02 DIAGNOSIS — R45.4 IRRITABILITY: ICD-10-CM

## 2020-06-02 DIAGNOSIS — Z87.898 HISTORY OF SEIZURE: ICD-10-CM

## 2020-06-02 DIAGNOSIS — F33.1 MDD (MAJOR DEPRESSIVE DISORDER), RECURRENT EPISODE, MODERATE (H): Primary | ICD-10-CM

## 2020-06-02 PROCEDURE — 99214 OFFICE O/P EST MOD 30 MIN: CPT | Mod: 95 | Performed by: FAMILY MEDICINE

## 2020-06-02 RX ORDER — BUPROPION HYDROCHLORIDE 300 MG/1
300 TABLET ORAL EVERY MORNING
Qty: 30 TABLET | Refills: 1 | Status: SHIPPED | OUTPATIENT
Start: 2020-06-02 | End: 2020-08-11

## 2020-06-02 ASSESSMENT — PATIENT HEALTH QUESTIONNAIRE - PHQ9
SUM OF ALL RESPONSES TO PHQ QUESTIONS 1-9: 7
5. POOR APPETITE OR OVEREATING: MORE THAN HALF THE DAYS

## 2020-06-02 ASSESSMENT — ANXIETY QUESTIONNAIRES
5. BEING SO RESTLESS THAT IT IS HARD TO SIT STILL: MORE THAN HALF THE DAYS
IF YOU CHECKED OFF ANY PROBLEMS ON THIS QUESTIONNAIRE, HOW DIFFICULT HAVE THESE PROBLEMS MADE IT FOR YOU TO DO YOUR WORK, TAKE CARE OF THINGS AT HOME, OR GET ALONG WITH OTHER PEOPLE: VERY DIFFICULT
1. FEELING NERVOUS, ANXIOUS, OR ON EDGE: SEVERAL DAYS
7. FEELING AFRAID AS IF SOMETHING AWFUL MIGHT HAPPEN: SEVERAL DAYS
6. BECOMING EASILY ANNOYED OR IRRITABLE: NEARLY EVERY DAY
GAD7 TOTAL SCORE: 11
3. WORRYING TOO MUCH ABOUT DIFFERENT THINGS: SEVERAL DAYS
2. NOT BEING ABLE TO STOP OR CONTROL WORRYING: SEVERAL DAYS

## 2020-06-02 NOTE — NURSING NOTE
"Chief Complaint   Patient presents with     Depression     Anxiety     initial There were no vitals taken for this visit. Estimated body mass index is 28.39 kg/m  as calculated from the following:    Height as of 7/16/19: 1.956 m (6' 5\").    Weight as of 7/16/19: 108.6 kg (239 lb 6.4 oz).  BP completed using cuff size: .  L  R arm      Health Maintenance that is potentially due pending provider review:      Odilon Cox ma  "

## 2020-06-03 ASSESSMENT — ANXIETY QUESTIONNAIRES: GAD7 TOTAL SCORE: 11

## 2020-08-10 DIAGNOSIS — R45.4 IRRITABILITY: ICD-10-CM

## 2020-08-10 DIAGNOSIS — Z87.898 HISTORY OF SEIZURE: ICD-10-CM

## 2020-08-10 DIAGNOSIS — F33.1 MDD (MAJOR DEPRESSIVE DISORDER), RECURRENT EPISODE, MODERATE (H): ICD-10-CM

## 2020-08-11 RX ORDER — BUPROPION HYDROCHLORIDE 300 MG/1
TABLET ORAL
Qty: 30 TABLET | Refills: 0 | Status: SHIPPED | OUTPATIENT
Start: 2020-08-11 | End: 2020-08-25

## 2020-08-11 NOTE — TELEPHONE ENCOUNTER
Prescription approved per [facility] Refill Protocol.  Shu ALEXANDER RN

## 2020-08-11 NOTE — TELEPHONE ENCOUNTER
Pt call saying that his Pharm need Tuyet Carmona to approve on the meds. So, they are able to refill his meds. He needs his meds to last him until his appt.

## 2020-08-25 ENCOUNTER — OFFICE VISIT (OUTPATIENT)
Dept: FAMILY MEDICINE | Facility: CLINIC | Age: 45
End: 2020-08-25
Payer: COMMERCIAL

## 2020-08-25 VITALS
DIASTOLIC BLOOD PRESSURE: 87 MMHG | HEIGHT: 77 IN | WEIGHT: 239 LBS | TEMPERATURE: 97.7 F | OXYGEN SATURATION: 97 % | SYSTOLIC BLOOD PRESSURE: 142 MMHG | BODY MASS INDEX: 28.22 KG/M2 | HEART RATE: 82 BPM

## 2020-08-25 DIAGNOSIS — F33.1 MDD (MAJOR DEPRESSIVE DISORDER), RECURRENT EPISODE, MODERATE (H): Primary | ICD-10-CM

## 2020-08-25 DIAGNOSIS — R45.4 IRRITABILITY: ICD-10-CM

## 2020-08-25 DIAGNOSIS — R03.0 ELEVATED BP WITHOUT DIAGNOSIS OF HYPERTENSION: ICD-10-CM

## 2020-08-25 DIAGNOSIS — I77.74 VERTEBRAL ARTERY DISSECTION (H): ICD-10-CM

## 2020-08-25 DIAGNOSIS — F41.9 ANXIETY: ICD-10-CM

## 2020-08-25 PROCEDURE — 99214 OFFICE O/P EST MOD 30 MIN: CPT | Performed by: FAMILY MEDICINE

## 2020-08-25 RX ORDER — BUPROPION HYDROCHLORIDE 150 MG/1
150 TABLET ORAL EVERY MORNING
Qty: 12 TABLET | Refills: 0 | Status: CANCELLED | OUTPATIENT
Start: 2020-08-25

## 2020-08-25 RX ORDER — VENLAFAXINE HYDROCHLORIDE 75 MG/1
75 TABLET, EXTENDED RELEASE ORAL DAILY
Qty: 60 TABLET | Refills: 0 | Status: SHIPPED | OUTPATIENT
Start: 2020-08-25 | End: 2020-09-03

## 2020-08-25 ASSESSMENT — MIFFLIN-ST. JEOR: SCORE: 2091.48

## 2020-08-25 NOTE — PATIENT INSTRUCTIONS
Medication swapping...  Wean down on wellbutrin XL- take every other day for a week, then stop.    Wean up on effexor XR- take every other day (on alternate days than the wellbutrin, so one pill of each every day of one or the other), then increase to daily.    Make return appointment in 6 weeks to check on effects.  Recheck blood pressure.

## 2020-08-25 NOTE — PROGRESS NOTES
Subjective   Vahe Haas is a 45 year old male who presents to clinic today for the following health issues:    HPI   Depression Followup    How are you doing with your depression since your last visit? Improved slightly     Are you having other symptoms that might be associated with depression? Yes:  sleep     Have you had a significant life event?  No     Are you feeling anxious or having panic attacks?   No    Do you have any concerns with your use of alcohol or other drugs? No    Mood- is fine to a bit better, but the meds mostly still address making sure 'he's not a jerk'.  He doesn't notice the difference as much as his wife does.  It's not     History of med changes lately...  Plan in 3/20 to wean down/off zoloft and cont on the wellbutrin.  Off the zoloft in 6/20 visit, he felt he was more irritable, more of a jerk, more irritable.    At 6/20 visit, we increased the wellbutrin XL from 150mg/d to 300mg/d.    Since then, he doesn't really notice much.  Wife thinks he's not any better- 'still a jerk'.  Hasn't gotten feedback from work that he's been 'a jerk' or otherwise.  He also notes claustrophobia sx's are worse since the increase in wellbutrin XL.    His claustrophobia has really worsened, since increasing the dose  Worries that he'll be stuck someplace to die.  Watching people in confined spaces, makes him anxious.  Knows it's not rational.    Sexual se's are much, much improved.  Both pt and wife are happy with that change.      COVID updates-  Working at [employer], at home.  Work is fine.        Social History     Tobacco Use     Smoking status: Never Smoker     Smokeless tobacco: Never Used   Substance Use Topics     Alcohol use: Yes     Alcohol/week: 0.0 standard drinks     Comment: social, 0-2 times per week     Drug use: No     PHQ 7/16/2019 3/18/2020 6/2/2020   PHQ-9 Total Score 9 7 7   Q9: Thoughts of better off dead/self-harm past 2 weeks Not at all Not at all Not at all     TERRA-7 SCORE 3/18/2020  "6/2/2020   Total Score 1 11     Last PHQ-9 6/2/2020   1.  Little interest or pleasure in doing things 1   2.  Feeling down, depressed, or hopeless 1   3.  Trouble falling or staying asleep, or sleeping too much 1   4.  Feeling tired or having little energy 1   5.  Poor appetite or overeating 1   6.  Feeling bad about yourself 0   7.  Trouble concentrating 1   8.  Moving slowly or restless 1   Q9: Thoughts of better off dead/self-harm past 2 weeks 0   PHQ-9 Total Score 7   Difficulty at work, home, or with people -       Review of Systems   Constitutional, HEENT, cardiovascular, pulmonary, gi and gu systems are negative, except as otherwise noted.        Objective    BP (!) 142/87 (BP Location: Right arm, Patient Position: Sitting)   Pulse 82   Temp 97.7  F (36.5  C) (Tympanic)   Ht 1.956 m (6' 5\")   Wt 108.4 kg (239 lb)   SpO2 97%   BMI 28.34 kg/m    Body mass index is 28.34 kg/m .  Physical Exam   GENERAL APPEARANCE: healthy, alert and no distress     EYES: PERRL, sclera clear     NECK: no adenopathy, no asymmetry, masses, or scars and thyroid normal to palpation     RESP: lungs clear to auscultation - no rales, rhonchi or wheezes     CV: regular rates and rhythm, normal S1 S2, no S3 or S4 and no murmur, click or rub      Ext: warm, dry, no edema      Psych: full range affect, normal speech and grooming, judgement and insight intact           Assessment & Plan       ICD-10-CM    1. MDD (major depressive disorder), recurrent episode, moderate (H)  F33.1 DISCONTINUED: venlafaxine (EFFEXOR-ER) 75 MG 24 hr tablet   2. Irritability  R45.4 DISCONTINUED: venlafaxine (EFFEXOR-ER) 75 MG 24 hr tablet   3. Anxiety  F41.9 DISCONTINUED: venlafaxine (EFFEXOR-ER) 75 MG 24 hr tablet   4. Elevated BP without diagnosis of hypertension  R03.0    5. Vertebral artery dissection (H)  I77.74       MDD/Anxiety/Irritability-   Stopped zoloft in ~3/20, and just on increased wellbutrin XL since ~6/20.  Per wife, irritability sx's are " "not good, worse than when he was on zoloft.  Also has more claustrophobia se's on the higher wellbutrin dose.  Notes significant improvement in sexual se's after stopping zoloft, and lost weight (~15 lbs) after stopping it as well.    Discussed options.  Will wean down/off wellbutrin XL - taking every other day for a week, then stopping.    BP borderline today.  Will cont to monitor closely and consider medications, especially with his h/o vertebral artery dissection.     BMI:   Estimated body mass index is 28.34 kg/m  as calculated from the following:    Height as of this encounter: 1.956 m (6' 5\").    Weight as of this encounter: 108.4 kg (239 lb).   Weight management plan: Discussed healthy diet and exercise guidelines      Return in about 6 weeks (around 10/6/2020) for Physical Exam, Depression follow-up, Fasting labs at appointment, Blood Pressure Recheck.    Neida Benz MD  [facility]    "

## 2020-11-02 DIAGNOSIS — F33.1 MDD (MAJOR DEPRESSIVE DISORDER), RECURRENT EPISODE, MODERATE (H): ICD-10-CM

## 2020-11-02 DIAGNOSIS — R45.4 IRRITABILITY: ICD-10-CM

## 2020-11-02 NOTE — TELEPHONE ENCOUNTER
Routing refill request to provider for review/approval because:  Labs out of range:  BP  Labs not current:  Creat last 7/2019.  Pt is scheduled 11/18 with you.  Please authorize if appropriate.  Thanks,  Perri Stevens RN

## 2020-11-02 NOTE — TELEPHONE ENCOUNTER
Reason for call:  Medication   If this is a refill request, has the caller requested the refill from the pharmacy already? No  Will the patient be using a [facility]?  Name of the pharmacy and phone number for the current request:     Name of the medication requested: venlafaxine 75 mg    Other request: Patient needs medication refilled, but cannot get an appointment until 11/18, and will run out by then. Patient is wondering if he needs to be seen, or if it can just be filled.    Phone number to reach patient:  Home number on file [phone] (home)    Best Time:  anytime    Can we leave a detailed message on this number?  YES    Travel screening: Not Applicable

## 2020-11-03 RX ORDER — VENLAFAXINE HYDROCHLORIDE 75 MG/1
75 CAPSULE, EXTENDED RELEASE ORAL DAILY
Qty: 30 CAPSULE | Refills: 0 | Status: SHIPPED | OUTPATIENT
Start: 2020-11-03 | End: 2020-11-18

## 2020-11-18 ENCOUNTER — OFFICE VISIT (OUTPATIENT)
Dept: FAMILY MEDICINE | Facility: CLINIC | Age: 45
End: 2020-11-18
Payer: COMMERCIAL

## 2020-11-18 VITALS
HEART RATE: 77 BPM | HEIGHT: 73 IN | RESPIRATION RATE: 16 BRPM | SYSTOLIC BLOOD PRESSURE: 138 MMHG | OXYGEN SATURATION: 98 % | WEIGHT: 243 LBS | DIASTOLIC BLOOD PRESSURE: 80 MMHG | BODY MASS INDEX: 32.2 KG/M2 | TEMPERATURE: 97.4 F

## 2020-11-18 DIAGNOSIS — R45.4 IRRITABILITY: ICD-10-CM

## 2020-11-18 DIAGNOSIS — F33.1 MDD (MAJOR DEPRESSIVE DISORDER), RECURRENT EPISODE, MODERATE (H): Primary | ICD-10-CM

## 2020-11-18 DIAGNOSIS — I77.74 VERTEBRAL ARTERY DISSECTION (H): ICD-10-CM

## 2020-11-18 DIAGNOSIS — Z13.6 CARDIOVASCULAR SCREENING; LDL GOAL LESS THAN 160: ICD-10-CM

## 2020-11-18 DIAGNOSIS — R03.0 ELEVATED BP WITHOUT DIAGNOSIS OF HYPERTENSION: ICD-10-CM

## 2020-11-18 LAB
ALBUMIN SERPL-MCNC: 3.9 G/DL (ref 3.4–5)
ALP SERPL-CCNC: 90 U/L (ref 40–150)
ALT SERPL W P-5'-P-CCNC: 50 U/L (ref 0–70)
ANION GAP SERPL CALCULATED.3IONS-SCNC: 7 MMOL/L (ref 3–14)
AST SERPL W P-5'-P-CCNC: 23 U/L (ref 0–45)
BILIRUB SERPL-MCNC: 0.4 MG/DL (ref 0.2–1.3)
BUN SERPL-MCNC: 20 MG/DL (ref 7–30)
CALCIUM SERPL-MCNC: 8.6 MG/DL (ref 8.5–10.1)
CHLORIDE SERPL-SCNC: 109 MMOL/L (ref 94–109)
CHOLEST SERPL-MCNC: 179 MG/DL
CO2 SERPL-SCNC: 23 MMOL/L (ref 20–32)
CREAT SERPL-MCNC: 1.02 MG/DL (ref 0.66–1.25)
GFR SERPL CREATININE-BSD FRML MDRD: 89 ML/MIN/{1.73_M2}
GLUCOSE SERPL-MCNC: 113 MG/DL (ref 70–99)
HDLC SERPL-MCNC: 32 MG/DL
LDLC SERPL CALC-MCNC: 122 MG/DL
NONHDLC SERPL-MCNC: 147 MG/DL
POTASSIUM SERPL-SCNC: 3.7 MMOL/L (ref 3.4–5.3)
PROT SERPL-MCNC: 7 G/DL (ref 6.8–8.8)
SODIUM SERPL-SCNC: 139 MMOL/L (ref 133–144)
TRIGL SERPL-MCNC: 123 MG/DL

## 2020-11-18 PROCEDURE — 99214 OFFICE O/P EST MOD 30 MIN: CPT | Performed by: FAMILY MEDICINE

## 2020-11-18 PROCEDURE — 80061 LIPID PANEL: CPT | Performed by: FAMILY MEDICINE

## 2020-11-18 PROCEDURE — 36415 COLL VENOUS BLD VENIPUNCTURE: CPT | Performed by: FAMILY MEDICINE

## 2020-11-18 PROCEDURE — 80053 COMPREHEN METABOLIC PANEL: CPT | Performed by: FAMILY MEDICINE

## 2020-11-18 RX ORDER — VENLAFAXINE HYDROCHLORIDE 75 MG/1
75 CAPSULE, EXTENDED RELEASE ORAL DAILY
Qty: 90 CAPSULE | Refills: 1 | Status: SHIPPED | OUTPATIENT
Start: 2020-11-18 | End: 2021-06-08

## 2020-11-18 ASSESSMENT — PATIENT HEALTH QUESTIONNAIRE - PHQ9
5. POOR APPETITE OR OVEREATING: NOT AT ALL
SUM OF ALL RESPONSES TO PHQ QUESTIONS 1-9: 4

## 2020-11-18 ASSESSMENT — ANXIETY QUESTIONNAIRES
6. BECOMING EASILY ANNOYED OR IRRITABLE: NOT AT ALL
IF YOU CHECKED OFF ANY PROBLEMS ON THIS QUESTIONNAIRE, HOW DIFFICULT HAVE THESE PROBLEMS MADE IT FOR YOU TO DO YOUR WORK, TAKE CARE OF THINGS AT HOME, OR GET ALONG WITH OTHER PEOPLE: NOT DIFFICULT AT ALL
1. FEELING NERVOUS, ANXIOUS, OR ON EDGE: NOT AT ALL
2. NOT BEING ABLE TO STOP OR CONTROL WORRYING: NOT AT ALL
7. FEELING AFRAID AS IF SOMETHING AWFUL MIGHT HAPPEN: NOT AT ALL
GAD7 TOTAL SCORE: 1
3. WORRYING TOO MUCH ABOUT DIFFERENT THINGS: NOT AT ALL
5. BEING SO RESTLESS THAT IT IS HARD TO SIT STILL: SEVERAL DAYS

## 2020-11-18 ASSESSMENT — MIFFLIN-ST. JEOR: SCORE: 2038.18

## 2020-11-18 NOTE — PATIENT INSTRUCTIONS
Vegetables and fruits- 5-6 serving/day.  Try bagged salads from [employer], and their vegetables with hummus.    Exercise- 7-minute workout, [employer] stream options.  Shoot for 5 days/week.

## 2020-11-18 NOTE — PROGRESS NOTES
Subjective   Vaeh Haas is a 45 year old male who presents to clinic today for the following health issues:    HPI        Depression Followup    How are you doing with your depression since your last visit? Improved     Are you having other symptoms that might be associated with depression? No    Have you had a significant life event?  No     Are you feeling anxious or having panic attacks?   No    Do you have any concerns with your use of alcohol or other drugs? No      Last visit in 8/20, weaned down on wellbutrin XL (wasn't effective) and started effexor XR.    He thinks it's been working really well.  Hasn't been told he's a 'jerk' lately by his wife (the usual main symptom), and doing well at work- about to start in a new position at the same company.    Sexual se's- less than on the zoloft, and less than on the wellbutrin, though is still a 'little harder to 'finish'.  Other se's? Taking it at night, because he found he was a little groggy when took it in the morning. No insomnia issues taking it at night.    Last couple months, wife had surgery on her forearm tendons.  Forgets she can't open pill bottles, cans.    TERRA-7 was 11 in 6/20, 1 in 3/20.  Today at 1.  PHQ-9 was up at 14 in 9/18, then 5-9 in 2019, 7 in 3/20 & 6/20.  Today at 4.            Elevated BP- concerning due to his h/o vertebral artery dissection w/ stroke sx's.  BP on recheck 138/92 today...  Diet- not good.  ~1 serving of fruits/vegetables/day.  Harder now, because both of them work so much.  Eating a lot of [employer].  5x/wk for dinner.  Breakfast- coffee  Lunch- BP/banana sandwich (bread- 12 grain)  [employer]- gets their delivery- start getting their bagged salad, vegetables.    Exercise- minimal.              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  Will be a good challenge.  Feels like he's can think better than he can 'do'.  Harder for him to type quickly.  Has noticed  "improvement in coordination between his two hands.  Typing is a lot better, but not completely back.    --They now have 4 dogs-   New puppy- was dragging their 16yo dog around by the ear.  So got another puppy so they could play together.  [place] Waylon Asim Spaniels.  They'll play for 6 hrs together.          Social History     Tobacco Use     Smoking status: Never Smoker     Smokeless tobacco: Never Used   Substance Use Topics     Alcohol use: Yes     Alcohol/week: 0.0 standard drinks     Comment: social, 0-2 times per week     Drug use: No     PHQ 7/16/2019 3/18/2020 6/2/2020   PHQ-9 Total Score 9 7 7   Q9: Thoughts of better off dead/self-harm past 2 weeks Not at all Not at all Not at all     TERRA-7 SCORE 3/18/2020 6/2/2020   Total Score 1 11         How many servings of fruits and vegetables do you eat daily?  0-1    On average, how many sweetened beverages do you drink each day (Examples: soda, juice, sweet tea, etc.  Do NOT count diet or artificially sweetened beverages)?   2    How many days per week do you exercise enough to make your heart beat faster? 3 or less    How many minutes a day do you exercise enough to make your heart beat faster? 30 - 60    How many days per week do you miss taking your medication? 0        Review of Systems   Constitutional, HEENT, cardiovascular, pulmonary, gi and gu systems are negative, except as otherwise noted.      Objective    Ht 1.842 m (6' 0.5\")   Wt 110.2 kg (243 lb)   BMI 32.50 kg/m    Body mass index is 32.5 kg/m .  Physical Exam   GENERAL APPEARANCE: healthy, alert and no distress     EYES: PERRL, sclera clear     HENT: nose and mouth without ulcers or lesions     NECK: no adenopathy, no asymmetry, masses, or scars and thyroid normal to palpation     RESP: lungs clear to auscultation - no rales, rhonchi or wheezes     CV: regular rates and rhythm, normal S1 S2, no S3 or S4 and no murmur, click or rub      Abdomen: soft, nontender, no HSM or masses and bowel " sounds normal     Ext: warm, dry, no edema      Psych: full range affect, normal speech and grooming, judgement and insight intact     Results for orders placed or performed in visit on 11/18/20   Lipid panel reflex to direct LDL Fasting     Status: Abnormal   Result Value Ref Range    Cholesterol 179 <200 mg/dL    Triglycerides 123 <150 mg/dL    HDL Cholesterol 32 (L) >39 mg/dL    LDL Cholesterol Calculated 122 (H) <100 mg/dL    Non HDL Cholesterol 147 (H) <130 mg/dL   Comprehensive metabolic panel (BMP + Alb, Alk Phos, ALT, AST, Total. Bili, TP)     Status: Abnormal   Result Value Ref Range    Sodium 139 133 - 144 mmol/L    Potassium 3.7 3.4 - 5.3 mmol/L    Chloride 109 94 - 109 mmol/L    Carbon Dioxide 23 20 - 32 mmol/L    Anion Gap 7 3 - 14 mmol/L    Glucose 113 (H) 70 - 99 mg/dL    Urea Nitrogen 20 7 - 30 mg/dL    Creatinine 1.02 0.66 - 1.25 mg/dL    GFR Estimate 89 >60 mL/min/[1.73_m2]    GFR Estimate If Black >90 >60 mL/min/[1.73_m2]    Calcium 8.6 8.5 - 10.1 mg/dL    Bilirubin Total 0.4 0.2 - 1.3 mg/dL    Albumin 3.9 3.4 - 5.0 g/dL    Protein Total 7.0 6.8 - 8.8 g/dL    Alkaline Phosphatase 90 40 - 150 U/L    ALT 50 0 - 70 U/L    AST 23 0 - 45 U/L           Assessment & Plan       ICD-10-CM    1. MDD (major depressive disorder), recurrent episode, moderate (H)  F33.1 venlafaxine (EFFEXOR-XR) 75 MG 24 hr capsule     Comprehensive metabolic panel (BMP + Alb, Alk Phos, ALT, AST, Total. Bili, TP)   2. Irritability  R45.4 venlafaxine (EFFEXOR-XR) 75 MG 24 hr capsule   3. Elevated BP without diagnosis of hypertension  R03.0    4. Vertebral artery dissection (H)  I77.74 Lipid panel reflex to direct LDL Fasting     Comprehensive metabolic panel (BMP + Alb, Alk Phos, ALT, AST, Total. Bili, TP)   5. CARDIOVASCULAR SCREENING; LDL GOAL LESS THAN 160  Z13.6 Lipid panel reflex to direct LDL Fasting     Comprehensive metabolic panel (BMP + Alb, Alk Phos, ALT, AST, Total. Bili, TP)      MDD/irritability- pt feels like he's  doing well on the effexor compared to the wellbutrin.  Main issue is that his wife feels he's a jerk if not on the right medication, and he hasn't been told he's a jerk lately.  PHQ and TERRA are also improved.  Less sexual se's than on the wellbutrin, and much less than the zoloft, though still not gone (though he's not sure how things would be off meds as that hasn't been the case for awhile).  No other se's.  Will cont effexor XR 75mg/d.  Risks and benefits of medication(s) including potential side effects reviewed with patient.  Questions answered.   Okay for q6mo f/u for mood/irritability.    Elevated BPs, h/o vertebral dissection/stroke -  Pt had f/u with neurology in 2019, rec prn f/u.  Rec continuing on asa (taken off pt's med list in 3/20, but incorrect, will add back).  Discussed need for lowered BP.  Pt motivated to do with diet/exercise changes- both poor now.  Rec increasing vegetables/fruits to 5-6 servings/day.  Rec [employer] salads/vegetables for lunch, prior to dinners, less [employer].  Add exercise- rec trials of 7min workout or peloton streaming.    Will check lipids/CMP today- is fasting.            Return in about 6 weeks (around 12/30/2020) for Blood Pressure Recheck.    Neida Benz MD  [facility]

## 2020-11-18 NOTE — NURSING NOTE
"Chief Complaint   Patient presents with     Recheck Medication     initial /80 (BP Location: Left arm, Cuff Size: Adult Large)   Pulse 77   Temp 97.4  F (36.3  C) (Tympanic)   Resp 16   Ht 1.842 m (6' 0.5\")   Wt 110.2 kg (243 lb)   SpO2 98%   BMI 32.50 kg/m   Estimated body mass index is 32.5 kg/m  as calculated from the following:    Height as of this encounter: 1.842 m (6' 0.5\").    Weight as of this encounter: 110.2 kg (243 lb).  BP completed using cuff size: large.  L  arm      Health Maintenance that is potentially due pending provider review:  phq 9 given    n/a    Odilon Cox ma  "

## 2020-11-18 NOTE — RESULT ENCOUNTER NOTE
Here are your lab results from your recent visit...  -Your cholesterol panel looks okay with a 'above desirable' LDL (the bad cholesterol) and a low HDL (the good cholesterol).   -Your CMP (which includes electrolyte levels, blood sugar levels, and kidney and liver function tests) looks good other than the glucose.  Your blood glucose (blood sugar), a screening test for diabetes, came back in the 'pre-diabetic' range.  This means that you do not have diabetes yet, but may be on the path to develop it without lifestyle changes (increasing your vegetables like we discussed, and lowering sugars, sweets, and white carbs (rice, pasta, bread- look for 'whole grain' for the first ingredient in the bread you chose).      We should talk about this further at your next appointment in ~6 weeks.    Rehan Gonzalez MD

## 2020-11-19 ASSESSMENT — ANXIETY QUESTIONNAIRES: GAD7 TOTAL SCORE: 1

## 2021-01-15 ENCOUNTER — HEALTH MAINTENANCE LETTER (OUTPATIENT)
Age: 46
End: 2021-01-15

## 2021-03-31 ENCOUNTER — IMMUNIZATION (OUTPATIENT)
Dept: FAMILY MEDICINE | Facility: CLINIC | Age: 46
End: 2021-03-31
Payer: COMMERCIAL

## 2021-03-31 PROCEDURE — 0011A PR COVID VAC MODERNA 100 MCG/0.5 ML IM: CPT

## 2021-03-31 PROCEDURE — 91301 PR COVID VAC MODERNA 100 MCG/0.5 ML IM: CPT

## 2021-04-28 ENCOUNTER — IMMUNIZATION (OUTPATIENT)
Dept: FAMILY MEDICINE | Facility: CLINIC | Age: 46
End: 2021-04-28
Attending: FAMILY MEDICINE
Payer: COMMERCIAL

## 2021-04-28 PROCEDURE — 0012A PR COVID VAC MODERNA 100 MCG/0.5 ML IM: CPT

## 2021-04-28 PROCEDURE — 91301 PR COVID VAC MODERNA 100 MCG/0.5 ML IM: CPT

## 2021-06-08 DIAGNOSIS — R45.4 IRRITABILITY: ICD-10-CM

## 2021-06-08 DIAGNOSIS — F33.1 MDD (MAJOR DEPRESSIVE DISORDER), RECURRENT EPISODE, MODERATE (H): ICD-10-CM

## 2021-06-08 RX ORDER — VENLAFAXINE HYDROCHLORIDE 75 MG/1
75 CAPSULE, EXTENDED RELEASE ORAL DAILY
Qty: 30 CAPSULE | Refills: 0 | Status: SHIPPED | OUTPATIENT
Start: 2021-06-08 | End: 2021-08-03 | Stop reason: DRUGHIGH

## 2021-06-08 NOTE — TELEPHONE ENCOUNTER
Reason for Call:  Other prescription    Detailed comments: patient has appt on 7/6/21  But needs a refill Today or tomorrow of   venlafaxine (EFFEXOR-XR) 75 MG 24 hr capsule 90       please send to [id number] - [place], [place] - [address]    Phone Number Patient can be reached at: Other phone number:  N/A*    Best Time: Today    Can we leave a detailed message on this number? Not Applicable    Call taken on 6/8/2021 at 1:21 PM by Quin Amezcua

## 2021-06-08 NOTE — TELEPHONE ENCOUNTER
Venlafaxine:    Medication is being filled for 1 time refill only due to:  Patient needs to be seen because due for 6 month follow up. Last 11/18/2020.     Blanca Bourgeois RN

## 2021-07-07 NOTE — PROGRESS NOTES
Assessment & Plan   45 year old male with know history of depression, irritability- follow up for medications management for mood,bp recheck and painless  swelling on right lower pelvic region.     MDD (major depressive disorder), recurrent episode, moderate (H) & Anxiety  PHQ 6/2/2020 11/18/2020 7/8/2021   PHQ-9 Total Score 7 4 5   Q9: Thoughts of better off dead/self-harm past 2 weeks Not at all Not at all Not at all     TERRA-7 SCORE 6/2/2020 11/18/2020 7/8/2021   Total Score 11 1 8       Commendable effort in starting routine exercise since June 20th - keep that up- as its very beneficial for mental health  Consider talk therapy/ CBT- as there is good evidence for its benfit as well  Taper down Effexor to 37.5 mg once daily & monitor symptoms, if more irritable , ok to stay at 75 mg.  See [facility] for gene sight mapping  Follow up with primary care physicain within 4 weeks, earlier if more concerns  Add over the counter omega 3 , up to 1000 mcg & novce if that improves mood over longer period of time  Over the counter vitamin D 1000 international unit(s) is als a good add on       - venlafaxine (EFFEXOR-XR) 37.5 MG 24 hr capsule; Take 1 capsule (37.5 mg) by mouth daily  - MENTAL HEALTH REFERRAL  - Adult; Outpatient Treatment; Individual/Couples/Family/Group Therapy/Health Psychology; [facility] [place]: [facility] [phone]; We will contact you to schedule the appointment or please call with any questions    Irritability    - venlafaxine (EFFEXOR-XR) 37.5 MG 24 hr capsule; Take 1 capsule (37.5 mg) by mouth daily  - MENTAL HEALTH REFERRAL  - Adult; Outpatient Treatment; Individual/Couples/Family/Group Therapy/Health Psychology; [facility]: [facility] [phone]; We will contact you to schedule the appointment or please call with any questions    Drug-induced erectile dysfunction  sertraline, citalpram & wellbutrin.none with Effexor but more irritable     History of Vertebral artery dissection  "(H)      Elevated BP without diagnosis of hypertension  Initial Blood pressure was high- recheck digitally well within acceptable limits.      JANA (obstructive sleep apnea)  Continue with CPAP and efforts for intentional weight loss     At the end of the visit- patient reports he noted a swelling on the right pelvic region since a month- no pain, no scrotal swelling, does not change in size-  On exam- left inguinal  hernia . Its about 3 inches in diameter- and due to size a surgical consultation is advised    47 minutes spent on the date of the encounter doing chart review, history and exam, documentation and further activities per the note       BMI:   Estimated body mass index is 34.38 kg/m  as calculated from the following:    Height as of this encounter: 1.842 m (6' 0.5\").    Weight as of this encounter: 116.6 kg (257 lb).   Weight management plan: Discussed healthy diet and exercise guidelines        Return in about 4 weeks (around 8/5/2021) for concerns,unresolved.    Meg Herrmann MD  [facility]   Vahe is a 45 year old who presents for the following health issues     HPI     Answers for HPI/ROS submitted by the patient on 7/8/2021   If you checked off any problems, how difficult have these problems made it for you to do your work, take care of things at home, or get along with other people?: Not difficult at all  PHQ9 TOTAL SCORE: 5    Depression Followup    How are you doing with your depression since your last visit? Per pt states his \"wife had told him he was a jerk\" medication is helping with sexual side effects but not with depression and when he was taking the bupropion wife said he was less of a jerk but not helping with sexual side effects     He reports he follows his wife leads about how he is on medications- as its hard for him to decide how he really feels with or without medications.  He does endorse that since jun 20th regular physical activity has " "improved his mood.  He showed his peloton application on his smart phone  with regular classes at least 20-30 mins daily till date.      Are you having other symptoms that might be associated with depression? No    Have you had a significant life event?  OTHER: peloton bike      Are you feeling anxious or having panic attacks?   No    Do you have any concerns with your use of alcohol or other drugs? No    Social History     Tobacco Use     Smoking status: Never Smoker     Smokeless tobacco: Never Used   Substance Use Topics     Alcohol use: Yes     Alcohol/week: 0.0 standard drinks     Comment: social, 0-2 times per week     Drug use: No     PHQ 6/2/2020 11/18/2020 7/8/2021   PHQ-9 Total Score 7 4 5   Q9: Thoughts of better off dead/self-harm past 2 weeks Not at all Not at all Not at all     TERRA-7 SCORE 6/2/2020 11/18/2020 7/8/2021   Total Score 11 1 8       Suicide Assessment Five-step Evaluation and Treatment (SAFE-T)      How many servings of fruits and vegetables do you eat daily?  Doing better 2-3    On average, how many sweetened beverages do you drink each day (Examples: soda, juice, sweet tea, etc.  Do NOT count diet or artificially sweetened beverages)?   0    How many days per week do you exercise enough to make your heart beat faster? 7 days with the peloton bike     How many minutes a day do you exercise enough to make your heart beat faster? 20 - 29    How many days per week do you miss taking your medication? 0        Review of Systems   Constitutional, HEENT, cardiovascular, pulmonary, GI, , musculoskeletal, neuro, skin, endocrine and psych systems are negative, except as otherwise noted.      Objective    /81   Pulse 72   Temp 97.1  F (36.2  C) (Skin)   Resp 17   Ht 1.842 m (6' 0.5\")   Wt 116.6 kg (257 lb)   SpO2 96%   BMI 34.38 kg/m    Body mass index is 34.38 kg/m .  Physical Exam   GENERAL: healthy, alert and no distress  NECK: no adenopathy, no asymmetry, masses, or scars and " thyroid normal to palpation  RESP: lungs clear to auscultation - no rales, rhonchi or wheezes  CV: regular rate and rhythm, normal S1 S2, no S3 or S4, no murmur, click or rub, no peripheral edema and peripheral pulses strong  ABDOMEN: soft, nontender, no hepatosplenomegaly, no masses and bowel sounds normal  Right sided lipoma- spurapubic about 3 inch- soft.   PSYCH: mentation appears normal, affect normal/bright    No results found for this or any previous visit (from the past 24 hour(s)).

## 2021-07-08 ENCOUNTER — OFFICE VISIT (OUTPATIENT)
Dept: FAMILY MEDICINE | Facility: CLINIC | Age: 46
End: 2021-07-08
Payer: COMMERCIAL

## 2021-07-08 VITALS
TEMPERATURE: 97.1 F | OXYGEN SATURATION: 96 % | BODY MASS INDEX: 34.06 KG/M2 | SYSTOLIC BLOOD PRESSURE: 125 MMHG | DIASTOLIC BLOOD PRESSURE: 81 MMHG | HEIGHT: 73 IN | WEIGHT: 257 LBS | RESPIRATION RATE: 17 BRPM | HEART RATE: 72 BPM

## 2021-07-08 DIAGNOSIS — R45.4 IRRITABILITY: ICD-10-CM

## 2021-07-08 DIAGNOSIS — F41.9 ANXIETY: ICD-10-CM

## 2021-07-08 DIAGNOSIS — K40.90 RIGHT INGUINAL HERNIA: ICD-10-CM

## 2021-07-08 DIAGNOSIS — F33.1 MDD (MAJOR DEPRESSIVE DISORDER), RECURRENT EPISODE, MODERATE (H): Primary | ICD-10-CM

## 2021-07-08 DIAGNOSIS — G47.33 OSA (OBSTRUCTIVE SLEEP APNEA): ICD-10-CM

## 2021-07-08 DIAGNOSIS — I77.74 VERTEBRAL ARTERY DISSECTION (H): ICD-10-CM

## 2021-07-08 DIAGNOSIS — R03.0 ELEVATED BP WITHOUT DIAGNOSIS OF HYPERTENSION: ICD-10-CM

## 2021-07-08 DIAGNOSIS — N52.2 DRUG-INDUCED ERECTILE DYSFUNCTION: ICD-10-CM

## 2021-07-08 PROBLEM — D17.30 LIPOMA OF SKIN AND SUBCUTANEOUS TISSUE: Status: ACTIVE | Noted: 2021-07-08

## 2021-07-08 PROCEDURE — 96127 BRIEF EMOTIONAL/BEHAV ASSMT: CPT | Performed by: FAMILY MEDICINE

## 2021-07-08 PROCEDURE — 99215 OFFICE O/P EST HI 40 MIN: CPT | Performed by: FAMILY MEDICINE

## 2021-07-08 RX ORDER — VENLAFAXINE HYDROCHLORIDE 75 MG/1
75 CAPSULE, EXTENDED RELEASE ORAL DAILY
Qty: 90 CAPSULE | Refills: 3 | Status: CANCELLED | OUTPATIENT
Start: 2021-07-08

## 2021-07-08 RX ORDER — VENLAFAXINE HYDROCHLORIDE 37.5 MG/1
37.5 CAPSULE, EXTENDED RELEASE ORAL DAILY
Qty: 60 CAPSULE | Refills: 1 | Status: SHIPPED | OUTPATIENT
Start: 2021-07-08 | End: 2021-08-18 | Stop reason: ALTCHOICE

## 2021-07-08 ASSESSMENT — PATIENT HEALTH QUESTIONNAIRE - PHQ9
10. IF YOU CHECKED OFF ANY PROBLEMS, HOW DIFFICULT HAVE THESE PROBLEMS MADE IT FOR YOU TO DO YOUR WORK, TAKE CARE OF THINGS AT HOME, OR GET ALONG WITH OTHER PEOPLE: NOT DIFFICULT AT ALL
SUM OF ALL RESPONSES TO PHQ QUESTIONS 1-9: 5
5. POOR APPETITE OR OVEREATING: SEVERAL DAYS
SUM OF ALL RESPONSES TO PHQ QUESTIONS 1-9: 5

## 2021-07-08 ASSESSMENT — ANXIETY QUESTIONNAIRES
2. NOT BEING ABLE TO STOP OR CONTROL WORRYING: NOT AT ALL
GAD7 TOTAL SCORE: 8
7. FEELING AFRAID AS IF SOMETHING AWFUL MIGHT HAPPEN: NOT AT ALL
IF YOU CHECKED OFF ANY PROBLEMS ON THIS QUESTIONNAIRE, HOW DIFFICULT HAVE THESE PROBLEMS MADE IT FOR YOU TO DO YOUR WORK, TAKE CARE OF THINGS AT HOME, OR GET ALONG WITH OTHER PEOPLE: SOMEWHAT DIFFICULT
3. WORRYING TOO MUCH ABOUT DIFFERENT THINGS: SEVERAL DAYS
1. FEELING NERVOUS, ANXIOUS, OR ON EDGE: SEVERAL DAYS
5. BEING SO RESTLESS THAT IT IS HARD TO SIT STILL: MORE THAN HALF THE DAYS
6. BECOMING EASILY ANNOYED OR IRRITABLE: NEARLY EVERY DAY

## 2021-07-08 ASSESSMENT — MIFFLIN-ST. JEOR: SCORE: 2101.68

## 2021-07-08 NOTE — PATIENT INSTRUCTIONS
MDD (major depressive disorder), recurrent episode, moderate (H) & Anxiety  Commendable effort in starting routine exercise since June 20th - keep that up- as its very beneficial for mental health  Consider talk therapy/ CBT- as there is good evidence for its benfit as well  Taper down Effexor to 37.5 mg once daily & monitor symptoms, if more irritable , ok to stay at 75 mg.  See [facility] for gene sight mapping  Follow up with primary care physicain within 4 weeks, earlier if more concerns  Add over the counter omega 3 , up to 1000 mg & notice if that improves mood over longer period of time.  Over the counter vitamin D 1000 international unit(s) is also a good add on     - EMOTIONAL / BEHAVIORAL ASSESSMENT  - venlafaxine (EFFEXOR-XR) 37.5 MG 24 hr capsule; Take 1 capsule (37.5 mg) by mouth daily  - MENTAL HEALTH REFERRAL  - Adult; Outpatient Treatment; Individual/Couples/Family/Group Therapy/Health Psychology; [facility]: [facility] [phone]; We will contact you to schedule the appointment or please call with any questions    Right suprapubic lipoma - see general surgeon

## 2021-07-09 ASSESSMENT — ANXIETY QUESTIONNAIRES: GAD7 TOTAL SCORE: 8

## 2021-07-12 ENCOUNTER — TELEPHONE (OUTPATIENT)
Dept: FAMILY MEDICINE | Facility: CLINIC | Age: 46
End: 2021-07-12

## 2021-07-12 NOTE — TELEPHONE ENCOUNTER
MTM referral from: [facility] visit (referral by provider)    MTM referral outreach attempt #2 on July 12, 2021 at 2:44 PM      Outcome: Patient not reachable after several attempts, will route to [facility]TM Pharmacist/Provider as an FYI. Thank you for the referral.    Pt is private pay.    Alexandr Hallman, MTM coordinator

## 2021-07-13 ENCOUNTER — OFFICE VISIT (OUTPATIENT)
Dept: SURGERY | Facility: CLINIC | Age: 46
End: 2021-07-13
Attending: FAMILY MEDICINE
Payer: COMMERCIAL

## 2021-07-13 VITALS — DIASTOLIC BLOOD PRESSURE: 80 MMHG | SYSTOLIC BLOOD PRESSURE: 130 MMHG | HEART RATE: 89 BPM

## 2021-07-13 DIAGNOSIS — K40.90 RIGHT INGUINAL HERNIA: Primary | ICD-10-CM

## 2021-07-13 PROCEDURE — 99204 OFFICE O/P NEW MOD 45 MIN: CPT | Performed by: SURGERY

## 2021-07-14 ENCOUNTER — TELEPHONE (OUTPATIENT)
Dept: SURGERY | Facility: CLINIC | Age: 46
End: 2021-07-14

## 2021-07-14 NOTE — TELEPHONE ENCOUNTER
Type of surgery: robotic assisted bilateral inguinal hernia repair  Location of surgery: [facility]  Date and time of surgery: 8/18/21 12:20pm  Surgeon: Dr Tilley  Pre-Op Appt Date: pt to schedule  Post-Op Appt Date: pt to schedule   Packet sent out: Yes  Pre-cert/Authorization completed:  Not Applicable  Date: 7/14/21

## 2021-07-14 NOTE — PROGRESS NOTES
[facility]  Surgery Consultation    HPI: Patient is a 45 year old male who is here for consultation requested by Neida Benz [phone] for evaluation of right inguinal hernia. Hernia has been present for last 2 weeks.  Noticed it while taking a shower.  Pain is primarily located in the right groin. Patient states pain is worse with exercise. Pain is rated as mild. Symptoms are improved with rest. Hernia has not been incarcerated. Patient has not had any symptoms of bowel obstruction. Patient denies fevers, chills, nausea, vomiting, SOB, chest pain, abdominal pain..    PMH:   has a past medical history of Allergic rhinitis, cause unspecified, Cerebral infarction (H) (Sept 2019), Depressive disorder (2010), Insomnia, Moderate major depression (H) (2/24/2011), and Other convulsions (1999).  PSH:    has a past surgical history that includes TOOTH EXTRACTION W/FORCEP (2003).  Social History:   reports that he has never smoked. He has never used smokeless tobacco. He reports current alcohol use. He reports that he does not use drugs.  Family History:   family history includes Allergies in his father, maternal grandmother, mother, and sister; Arthritis in his maternal grandmother; Breast Cancer in his paternal grandmother; Cerebrovascular Disease in his maternal grandmother; Depression in his mother and paternal grandfather; Gastrointestinal Disease in his paternal grandfather; Hypertension in his maternal grandmother, mother, and paternal grandfather; Lipids in his father, maternal grandmother, mother, and paternal grandfather; Musculoskeletal Disorder in his mother; Osteoporosis in his maternal grandmother and mother; Respiratory in his father, mother, and sister; Thyroid Disease in his sister.  Medications/Allergies: Home medications and allergies reviewed.    ROS:  The 12 point Review of Systems is negative other than noted in the HPI.    Physical Exam:  /80   Pulse 89   GENERAL:  Generally appears well.  Psych: Alert and Oriented.  Normal affect  Eyes: Sclera clear  Respiratory:  Lungs with good air excursion  Cardiovascular:  Normal peripheral pulses  GI: Abdomen Soft Non-Tender entire abdomen No hernias palpated..  Groin- I examined the patient in both the standing and supine positions. Right Groin- moderate sized inguinal hernia, hernia was reducible and no tenderness Left Groin- small inguinal hernia, hernia was reducible and no tenderness No scrotal or testicle abnormalities.  Lymphatic/Hematologic/Immune:  No cervical lymphadenopathy.  Integumentary:  No rashes  Neurological: grossly intact     All new lab and imaging data was reviewed.     Impression and Plan:  Patient is a 45 year old male with right and possible left inguinal hernia.    PLAN:  I discussed the pathophysiology of hernias and options for repair including laparoscopic VS open.  I would recommend going forward with robotic or laparoscopic inguinal hernia repair with mesh.  If he has a left-sided hernia we will go forward with repair at that time.  The risks associated with the procedure including, but not limited to, recurrence, nerve entrapment or injury, persistence of pain, injury to the bowel/bladder, infertility, hematoma, mesh migration, mesh infection, MI, and PE were discussed with the patient. He indicated understanding of the discussion, asked appropriate questions, and provided consent. Signs and symptoms of incarceration were discussed. If these develop in the interim, he promises to call or go straight to the ER. I have provided the patient with an information pamphlet.  Thank you very much for this consult.    Dwayne Tilley M.D.  [facility]  [phone]    Please route or send letter to:  Primary Care Provider (PCP) and Referring Provider

## 2021-07-15 DIAGNOSIS — Z11.59 ENCOUNTER FOR SCREENING FOR OTHER VIRAL DISEASES: ICD-10-CM

## 2021-08-02 NOTE — PROGRESS NOTES
Vahe is a 46 year old who is being evaluated via a billable video visit.      How would you like to obtain your AVS? MyChart  If the video visit is dropped, the invitation should be resent by: Text to cell phone: [phone]  Will anyone else be joining your video visit? No        Assessment & Plan       ICD-10-CM    1. Irritability  R45.4 DULoxetine (CYMBALTA) 30 MG capsule   2. MDD (major depressive disorder), recurrent episode, moderate (H)  F33.1 DULoxetine (CYMBALTA) 30 MG capsule   3. Vertebral artery dissection (H)  I77.74       Irritability/MDD-   Effexor 75mg/d (never really went down on dose after 7/8/21 appt- had to work with pharmacy/insurance)- has gil on this for about a year.  Wife just recently told him that his irritability (main sx) has been worse on the effexor.  Sx's were fairly well controlled on the wellbutrin, but he had bad sexual se's on it.  Sexual se's were better on the effexor.  Would like to try something else.  Has tried SSRIs.  Discussed options.  Will try duloxetine 30mg/d, increasing to 60mg/d after a week (while weaning down on effexor at 37.5mg/d for a week, then off).  Follow-up in ~6 wks for recheck.  If not working fairly well, will likely then refer on to psychiatry.      Return in about 6 weeks (around 9/14/2021) for Depression follow-up.    Neida Benz MD  [facility]          Subjective   Vahe is a 46 year old who presents for the following health issues- irritability, MDD, h/o vertebral artery dissection    HPI     Depression Followup    How are you doing with your depression since your last visit?  7/8/2021 pt states that it's not going well with the medication per wife is not working well with depression     Are you having other symptoms that might be associated with depression? Yes:  sleeping more     Have you had a significant life event?  No     Are you feeling anxious or having panic attacks?   No    Do you have any concerns with your  use of alcohol or other drugs? No      Effexor- better for the sexual se's, but it is not as good for him 'not being a jerk'.  Wife is more aware of it.  He can also sense that he's more irritable.    On 7/8/21 appt- rec lowered dose to 37.5mg, but said he could take more.  He's been taking the 75mg/day.    In terms of mood/irritability- he feels that the wellbutrin was the best, but he has a h/o two seizures (in the past- at age ~21 and age ~32 (after days of no sleep, fall down stairs).  Also has h/o vertebral artery dissection a few yrs ago with cognitive issues for awhile afterwards.    Has been better about self cares...  Got a peloton ~8 wks ago, and has been doing getting on the bike every day.  Wt - not going down much, but he's feeling a lot stronger and better in general.      Social History     Tobacco Use     Smoking status: Never Smoker     Smokeless tobacco: Never Used   Substance Use Topics     Alcohol use: Yes     Alcohol/week: 0.0 standard drinks     Comment: social, 0-2 times per week     Drug use: No     PHQ 6/2/2020 11/18/2020 7/8/2021   PHQ-9 Total Score 7 4 5   Q9: Thoughts of better off dead/self-harm past 2 weeks Not at all Not at all Not at all     TERRA-7 SCORE 6/2/2020 11/18/2020 7/8/2021   Total Score 11 1 8         How many servings of fruits and vegetables do you eat daily?  Blueberries 250-300 grams     On average, how many sweetened beverages do you drink each day (Examples: soda, juice, sweet tea, etc.  Do NOT count diet or artificially sweetened beverages)?   1 coffee a day with sugar     How many days per week do you exercise enough to make your heart beat faster? 7    How many minutes a day do you exercise enough to make your heart beat faster? About 30 mins   How many days per week do you miss taking your medication? A couple of days     What makes it hard for you to take your medications?  remembering to take        Review of Systems   Constitutional, HEENT, cardiovascular,  pulmonary, gi and gu systems are negative, except as otherwise noted.      Objective           Vitals:  No vitals were obtained today due to virtual visit.    Physical Exam   GENERAL: Healthy, alert and no distress  EYES: Eyes grossly normal to inspection.  No discharge or erythema, or obvious scleral/conjunctival abnormalities.  RESP: No audible wheeze, cough, or visible cyanosis.  No visible retractions or increased work of breathing.    SKIN: Visible skin clear. No significant rash, abnormal pigmentation or lesions.  NEURO: Cranial nerves grossly intact.  Mentation and speech appropriate for age.  PSYCH: Mentation appears normal, affect normal/bright, judgement and insight intact, normal speech and appearance well-groomed.            Video-Visit Details    Type of service:  Video Visit    Video End Time:5:58 PM (17 minute video)    Originating Location (pt. Location): Home    Distant Location (provider location):  [facility]     Platform used for Video Visit: [employer]  Answers for HPI/ROS submitted by the patient on 8/3/2021  If you checked off any problems, how difficult have these problems made it for you to do your work, take care of things at home, or get along with other people?: Not difficult at all  PHQ9 TOTAL SCORE: 7  TERRA 7 TOTAL SCORE: 3

## 2021-08-03 ENCOUNTER — VIRTUAL VISIT (OUTPATIENT)
Dept: FAMILY MEDICINE | Facility: CLINIC | Age: 46
End: 2021-08-03
Payer: COMMERCIAL

## 2021-08-03 DIAGNOSIS — R45.4 IRRITABILITY: Primary | ICD-10-CM

## 2021-08-03 DIAGNOSIS — I77.74 VERTEBRAL ARTERY DISSECTION (H): ICD-10-CM

## 2021-08-03 DIAGNOSIS — F33.1 MDD (MAJOR DEPRESSIVE DISORDER), RECURRENT EPISODE, MODERATE (H): ICD-10-CM

## 2021-08-03 PROCEDURE — 99214 OFFICE O/P EST MOD 30 MIN: CPT | Mod: GT | Performed by: FAMILY MEDICINE

## 2021-08-03 RX ORDER — DULOXETIN HYDROCHLORIDE 30 MG/1
60 CAPSULE, DELAYED RELEASE ORAL DAILY
Qty: 120 CAPSULE | Refills: 0 | Status: SHIPPED | OUTPATIENT
Start: 2021-08-03 | End: 2021-10-08

## 2021-08-03 ASSESSMENT — ANXIETY QUESTIONNAIRES
3. WORRYING TOO MUCH ABOUT DIFFERENT THINGS: NOT AT ALL
8. IF YOU CHECKED OFF ANY PROBLEMS, HOW DIFFICULT HAVE THESE MADE IT FOR YOU TO DO YOUR WORK, TAKE CARE OF THINGS AT HOME, OR GET ALONG WITH OTHER PEOPLE?: NOT DIFFICULT AT ALL
5. BEING SO RESTLESS THAT IT IS HARD TO SIT STILL: SEVERAL DAYS
GAD7 TOTAL SCORE: 3
4. TROUBLE RELAXING: SEVERAL DAYS
7. FEELING AFRAID AS IF SOMETHING AWFUL MIGHT HAPPEN: NOT AT ALL
1. FEELING NERVOUS, ANXIOUS, OR ON EDGE: NOT AT ALL
GAD7 TOTAL SCORE: 3
GAD7 TOTAL SCORE: 3
2. NOT BEING ABLE TO STOP OR CONTROL WORRYING: NOT AT ALL
7. FEELING AFRAID AS IF SOMETHING AWFUL MIGHT HAPPEN: NOT AT ALL
6. BECOMING EASILY ANNOYED OR IRRITABLE: SEVERAL DAYS

## 2021-08-03 ASSESSMENT — PATIENT HEALTH QUESTIONNAIRE - PHQ9
SUM OF ALL RESPONSES TO PHQ QUESTIONS 1-9: 7
SUM OF ALL RESPONSES TO PHQ QUESTIONS 1-9: 7
10. IF YOU CHECKED OFF ANY PROBLEMS, HOW DIFFICULT HAVE THESE PROBLEMS MADE IT FOR YOU TO DO YOUR WORK, TAKE CARE OF THINGS AT HOME, OR GET ALONG WITH OTHER PEOPLE: NOT DIFFICULT AT ALL

## 2021-08-04 ASSESSMENT — PATIENT HEALTH QUESTIONNAIRE - PHQ9: SUM OF ALL RESPONSES TO PHQ QUESTIONS 1-9: 7

## 2021-08-04 ASSESSMENT — ANXIETY QUESTIONNAIRES: GAD7 TOTAL SCORE: 3

## 2021-08-11 NOTE — H&P (VIEW-ONLY)
[facility]  [address]  [facility] [address]  Phone: [phone]  Primary Provider: Neida Benz  Pre-op Performing Provider: ARMANI BAILEY      PREOPERATIVE EVALUATION:  Today's date: 8/12/2021    Vahe Haas is a 46 year old male who presents for a preoperative evaluation.    Surgical Information:  Surgery/Procedure: ROBOT-ASSISTED BILATERAL INGUINAL HERNIA REPAIR  Surgery Location: [facility]   Surgeon: Dwayne Tilley MD  Surgery Date: 8/18/2021  Time of Surgery: 10:15 am  Where patient plans to recover: At home with family  Fax number for surgical facility: Note does not need to be faxed, will be available electronically in Epic.    Type of Anesthesia Anticipated: General    Assessment & Plan     The proposed surgical procedure is considered INTERMEDIATE risk.    Preop general physical exam      Right inguinal hernia      Vertebral artery dissection (H)             Risks and Recommendations:  The patient has the following additional risks and recommendations for perioperative complications:   - No identified additional risk factors other than previously addressed    Medication Instructions:  Patient is to take all scheduled medications on the day of surgery    RECOMMENDATION:  APPROVAL GIVEN to proceed with proposed procedure, without further diagnostic evaluation.                      Subjective     HPI related to upcoming procedure: 47 y/o male with inguinal hernia for pre op exam.  Mildly symptomatic.  Had vertebral artery dissection       Preop Questions 8/12/2021   1. Have you ever had a heart attack or stroke? YES - 2018   2. Have you ever had surgery on your heart or blood vessels, such as a stent placement, a coronary artery bypass, or surgery on an artery in your head, neck, heart, or legs? No   3. Do you have chest pain with activity? No   4. Do you have a history of  heart failure? No   5. Do you  currently have a cold, bronchitis or symptoms of other infection? No   6. Do you have a cough, shortness of breath, or wheezing? No   7. Do you or anyone in your family have previous history of blood clots? No   8. Do you or does anyone in your family have a serious bleeding problem such as prolonged bleeding following surgeries or cuts? No   9. Have you ever had problems with anemia or been told to take iron pills? No   10. Have you had any abnormal blood loss such as black, tarry or bloody stools? No   11. Have you ever had a blood transfusion? No   12. Are you willing to have a blood transfusion if it is medically needed before, during, or after your surgery? Yes   13. Have you or any of your relatives ever had problems with anesthesia? No   14. Do you have sleep apnea, excessive snoring or daytime drowsiness? YES -    14a. Do you have a CPAP machine? Yes   15. Do you have any artifical heart valves or other implanted medical devices like a pacemaker, defibrillator, or continuous glucose monitor? No   16. Do you have artificial joints? No   17. Are you allergic to latex? No     Health Care Directive:  Patient does not have a Health Care Directive or Living Will:     Preoperative Review of :   reviewed - no record of controlled substances prescribed.          Review of Systems  CONSTITUTIONAL: NEGATIVE for fever, chills, change in weight  INTEGUMENTARY/SKIN: NEGATIVE for worrisome rashes, moles or lesions  EYES: NEGATIVE for vision changes or irritation  ENT/MOUTH: NEGATIVE for ear, mouth and throat problems  RESP: NEGATIVE for significant cough or SOB  CV: NEGATIVE for chest pain, palpitations or peripheral edema  GI: NEGATIVE for nausea, abdominal pain, heartburn, or change in bowel habits  : NEGATIVE for frequency, dysuria, or hematuria  MUSCULOSKELETAL: NEGATIVE for significant arthralgias or myalgia  NEURO: NEGATIVE for weakness, dizziness or paresthesias  ENDOCRINE: NEGATIVE for temperature  intolerance, skin/hair changes  HEME: NEGATIVE for bleeding problems  PSYCHIATRIC: NEGATIVE for changes in mood or affect    Patient Active Problem List    Diagnosis Date Noted     Anxiety 07/08/2021     Priority: Medium     Irritability 07/08/2021     Priority: Medium     Elevated BP without diagnosis of hypertension 07/08/2021     Priority: Medium     Right inguinal hernia 07/08/2021     Priority: Medium     see genral surgeon       History of seizure 02/13/2019     Priority: Medium     Grand mal seizure after accident & concussion ~age 21, last seizure around age ~32 (~3 days of no sleep, dizzy, fell down stairs, unsure if seizure was before or after the fall, never conclusively determined).       Vertebral artery dissection (H) 09/03/2018     Priority: Medium     Vertebral Artery Dissection, with subsequent Acute CVA- Lt PCA and RT PICA in 9/18. Supportive cares, no surgery or anti-thrombotics initially.  Started on asa 325mg.    No need for echo/lipids as not atherogenic cause, though possibly from violent cough a month earlier.    OT/Speech therapy completed, but sent back to OT in 7/19 for persistent right UE fine motor movements and multitasking issues (also with milder but persistent dysarthria, did not send back to speech therapy).    F/u with neurology in 2/19, rec prn f/u thereafter.  Rec cont asa 325mg/day for a year, then can stop (did not think significant benefit or harm from being on asa).    Avoid strenuous activity, chiropractic neck adjustments.       Acute bilateral thoracic back pain 11/08/2016     Priority: Medium     Acute bilateral low back pain without sciatica 11/08/2016     Priority: Medium     JANA (obstructive sleep apnea) 05/24/2016     Priority: Medium     CPAP started in ~6/16- very helpful, sleeps better, feels rested, doesn't snore.       Drug-induced erectile dysfunction 11/13/2015     Priority: Medium     Viagra not helpful.  Cialis more helpful (tried once), but expensive.        MDD (major depressive disorder), recurrent episode, moderate (H) 02/24/2011     Priority: Medium     Effexor XR 75mg/d - started in 8/20.    Sexual se's on zoloft.  Minimal improvement in sx's on wellbutrin alone (and increased claustrophobia).      1st dx with MDD in '09.  Mother has MDD.  Started on citalopram in '09, but felt dulled, off, libido issues.  Switched to wellbutrin in 12/10 with improvement in mood, less se's.  Does have h/o sz's (in his 20s after concussion, last one at age 32 before or after fall down stairs)- none on wellbutrin.  Switched from wellbutrin to zoloft 100mg in 6/13.  Sexual se's, so added back low-dose wellbutrin (50mg/d- 7/13).  He is aware of the increased seizure risk with wellbutrin.  '15- zoloft 100mg/d.  Wellbutrin XL 150mg/d in 10/15.  Thinks it's helpful- feels more level.         CARDIOVASCULAR SCREENING; LDL GOAL LESS THAN 160 10/31/2010     Priority: Medium     Insomnia 05/22/2007     Priority: Medium     Improved with exercise.         Allergic rhinitis      Priority: Medium     Allergic rhinitis.  Has been doing allergy shots since '12, helping.   Problem list name updated by automated process. Provider to review        Past Medical History:   Diagnosis Date     Allergic rhinitis, cause unspecified     Allergic rhinitis     Cerebral infarction (H) Sept 2019     Depressive disorder 2010     Insomnia     hard time getting to sleep     Moderate major depression (H) 2/24/2011     Other convulsions 1999    grand mal seizure after accident & concusion, also had until age 4, last sz around age 21     Past Surgical History:   Procedure Laterality Date     HC TOOTH EXTRACTION W/FORCEP  2003     Current Outpatient Medications   Medication Sig Dispense Refill     ALLERGY SHOTS        DULoxetine (CYMBALTA) 30 MG capsule Take 2 capsules (60 mg) by mouth daily (start with 1 capsule (30mg) daily for first week, then 60mg daily) 120 capsule 0     venlafaxine (EFFEXOR-XR) 37.5 MG 24 hr  "capsule Take 1 capsule (37.5 mg) by mouth daily 60 capsule 1       Allergies   Allergen Reactions     Seasonal Allergies         Social History     Tobacco Use     Smoking status: Never Smoker     Smokeless tobacco: Never Used   Substance Use Topics     Alcohol use: Yes     Alcohol/week: 0.0 standard drinks     Comment: social, 0-2 times per week       History   Drug Use No         Objective     /84   Pulse 84   Temp 97.3  F (36.3  C) (Skin)   Resp 16   Ht 1.842 m (6' 0.5\")   Wt 113.4 kg (250 lb)   SpO2 97%   BMI 33.44 kg/m      Physical Exam    GENERAL APPEARANCE: alert, active and no distress     EYES: EOMI,  PERRL     NECK: no adenopathy, no asymmetry, masses, or scars and thyroid normal to palpation     RESP: lungs clear to auscultation - no rales, rhonchi or wheezes     CV: regular rates and rhythm, normal S1 S2, no S3 or S4 and no murmur, click or rub     MS: extremities normal- no gross deformities noted, no evidence of inflammation in joints, FROM in all extremities.     SKIN: no suspicious lesions or rashes     NEURO: Normal strength and tone, sensory exam grossly normal, mentation intact and speech normal     PSYCH: mentation appears normal. and affect normal/bright     LYMPHATICS: No cervical adenopathy    Recent Labs   Lab Test 11/18/20  0818      POTASSIUM 3.7   CR 1.02        Diagnostics:  No labs were ordered during this visit.   No EKG required, no history of coronary heart disease, significant arrhythmia, peripheral arterial disease or other structural heart disease.    Revised Cardiac Risk Index (RCRI):  The patient has the following serious cardiovascular risks for perioperative complications:   - Cerebrovascular Disease (TIA or CVA) = 1 point     RCRI Interpretation: 1 point: Class II (low risk - 0.9% complication rate)           Signed Electronically by: Hema Adams PA-C  Copy of this evaluation report is provided to requesting physician.      "

## 2021-08-11 NOTE — PATIENT INSTRUCTIONS

## 2021-08-11 NOTE — PROGRESS NOTES
[facility]  [address]  [facility] [address]  Phone: [phone]  Primary Provider: Neida Benz  Pre-op Performing Provider: ARMANI BAILEY      PREOPERATIVE EVALUATION:  Today's date: 8/12/2021    Vahe Haas is a 46 year old male who presents for a preoperative evaluation.    Surgical Information:  Surgery/Procedure: ROBOT-ASSISTED BILATERAL INGUINAL HERNIA REPAIR  Surgery Location: [facility]   Surgeon: Dwayne Tilley MD  Surgery Date: 8/18/2021  Time of Surgery: 10:15 am  Where patient plans to recover: At home with family  Fax number for surgical facility: Note does not need to be faxed, will be available electronically in Epic.    Type of Anesthesia Anticipated: General    Assessment & Plan     The proposed surgical procedure is considered INTERMEDIATE risk.    Preop general physical exam      Right inguinal hernia      Vertebral artery dissection (H)             Risks and Recommendations:  The patient has the following additional risks and recommendations for perioperative complications:   - No identified additional risk factors other than previously addressed    Medication Instructions:  Patient is to take all scheduled medications on the day of surgery    RECOMMENDATION:  APPROVAL GIVEN to proceed with proposed procedure, without further diagnostic evaluation.                      Subjective     HPI related to upcoming procedure: 45 y/o male with inguinal hernia for pre op exam.  Mildly symptomatic.  Had vertebral artery dissection       Preop Questions 8/12/2021   1. Have you ever had a heart attack or stroke? YES - 2018   2. Have you ever had surgery on your heart or blood vessels, such as a stent placement, a coronary artery bypass, or surgery on an artery in your head, neck, heart, or legs? No   3. Do you have chest pain with activity? No   4. Do you have a history of  heart failure? No   5. Do you  currently have a cold, bronchitis or symptoms of other infection? No   6. Do you have a cough, shortness of breath, or wheezing? No   7. Do you or anyone in your family have previous history of blood clots? No   8. Do you or does anyone in your family have a serious bleeding problem such as prolonged bleeding following surgeries or cuts? No   9. Have you ever had problems with anemia or been told to take iron pills? No   10. Have you had any abnormal blood loss such as black, tarry or bloody stools? No   11. Have you ever had a blood transfusion? No   12. Are you willing to have a blood transfusion if it is medically needed before, during, or after your surgery? Yes   13. Have you or any of your relatives ever had problems with anesthesia? No   14. Do you have sleep apnea, excessive snoring or daytime drowsiness? YES -    14a. Do you have a CPAP machine? Yes   15. Do you have any artifical heart valves or other implanted medical devices like a pacemaker, defibrillator, or continuous glucose monitor? No   16. Do you have artificial joints? No   17. Are you allergic to latex? No     Health Care Directive:  Patient does not have a Health Care Directive or Living Will:     Preoperative Review of :   reviewed - no record of controlled substances prescribed.          Review of Systems  CONSTITUTIONAL: NEGATIVE for fever, chills, change in weight  INTEGUMENTARY/SKIN: NEGATIVE for worrisome rashes, moles or lesions  EYES: NEGATIVE for vision changes or irritation  ENT/MOUTH: NEGATIVE for ear, mouth and throat problems  RESP: NEGATIVE for significant cough or SOB  CV: NEGATIVE for chest pain, palpitations or peripheral edema  GI: NEGATIVE for nausea, abdominal pain, heartburn, or change in bowel habits  : NEGATIVE for frequency, dysuria, or hematuria  MUSCULOSKELETAL: NEGATIVE for significant arthralgias or myalgia  NEURO: NEGATIVE for weakness, dizziness or paresthesias  ENDOCRINE: NEGATIVE for temperature  intolerance, skin/hair changes  HEME: NEGATIVE for bleeding problems  PSYCHIATRIC: NEGATIVE for changes in mood or affect    Patient Active Problem List    Diagnosis Date Noted     Anxiety 07/08/2021     Priority: Medium     Irritability 07/08/2021     Priority: Medium     Elevated BP without diagnosis of hypertension 07/08/2021     Priority: Medium     Right inguinal hernia 07/08/2021     Priority: Medium     see genral surgeon       History of seizure 02/13/2019     Priority: Medium     Grand mal seizure after accident & concussion ~age 21, last seizure around age ~32 (~3 days of no sleep, dizzy, fell down stairs, unsure if seizure was before or after the fall, never conclusively determined).       Vertebral artery dissection (H) 09/03/2018     Priority: Medium     Vertebral Artery Dissection, with subsequent Acute CVA- Lt PCA and RT PICA in 9/18. Supportive cares, no surgery or anti-thrombotics initially.  Started on asa 325mg.    No need for echo/lipids as not atherogenic cause, though possibly from violent cough a month earlier.    OT/Speech therapy completed, but sent back to OT in 7/19 for persistent right UE fine motor movements and multitasking issues (also with milder but persistent dysarthria, did not send back to speech therapy).    F/u with neurology in 2/19, rec prn f/u thereafter.  Rec cont asa 325mg/day for a year, then can stop (did not think significant benefit or harm from being on asa).    Avoid strenuous activity, chiropractic neck adjustments.       Acute bilateral thoracic back pain 11/08/2016     Priority: Medium     Acute bilateral low back pain without sciatica 11/08/2016     Priority: Medium     JANA (obstructive sleep apnea) 05/24/2016     Priority: Medium     CPAP started in ~6/16- very helpful, sleeps better, feels rested, doesn't snore.       Drug-induced erectile dysfunction 11/13/2015     Priority: Medium     Viagra not helpful.  Cialis more helpful (tried once), but expensive.        MDD (major depressive disorder), recurrent episode, moderate (H) 02/24/2011     Priority: Medium     Effexor XR 75mg/d - started in 8/20.    Sexual se's on zoloft.  Minimal improvement in sx's on wellbutrin alone (and increased claustrophobia).      1st dx with MDD in '09.  Mother has MDD.  Started on citalopram in '09, but felt dulled, off, libido issues.  Switched to wellbutrin in 12/10 with improvement in mood, less se's.  Does have h/o sz's (in his 20s after concussion, last one at age 32 before or after fall down stairs)- none on wellbutrin.  Switched from wellbutrin to zoloft 100mg in 6/13.  Sexual se's, so added back low-dose wellbutrin (50mg/d- 7/13).  He is aware of the increased seizure risk with wellbutrin.  '15- zoloft 100mg/d.  Wellbutrin XL 150mg/d in 10/15.  Thinks it's helpful- feels more level.         CARDIOVASCULAR SCREENING; LDL GOAL LESS THAN 160 10/31/2010     Priority: Medium     Insomnia 05/22/2007     Priority: Medium     Improved with exercise.         Allergic rhinitis      Priority: Medium     Allergic rhinitis.  Has been doing allergy shots since '12, helping.   Problem list name updated by automated process. Provider to review        Past Medical History:   Diagnosis Date     Allergic rhinitis, cause unspecified     Allergic rhinitis     Cerebral infarction (H) Sept 2019     Depressive disorder 2010     Insomnia     hard time getting to sleep     Moderate major depression (H) 2/24/2011     Other convulsions 1999    grand mal seizure after accident & concusion, also had until age 4, last sz around age 21     Past Surgical History:   Procedure Laterality Date     HC TOOTH EXTRACTION W/FORCEP  2003     Current Outpatient Medications   Medication Sig Dispense Refill     ALLERGY SHOTS        DULoxetine (CYMBALTA) 30 MG capsule Take 2 capsules (60 mg) by mouth daily (start with 1 capsule (30mg) daily for first week, then 60mg daily) 120 capsule 0     venlafaxine (EFFEXOR-XR) 37.5 MG 24 hr  "capsule Take 1 capsule (37.5 mg) by mouth daily 60 capsule 1       Allergies   Allergen Reactions     Seasonal Allergies         Social History     Tobacco Use     Smoking status: Never Smoker     Smokeless tobacco: Never Used   Substance Use Topics     Alcohol use: Yes     Alcohol/week: 0.0 standard drinks     Comment: social, 0-2 times per week       History   Drug Use No         Objective     /84   Pulse 84   Temp 97.3  F (36.3  C) (Skin)   Resp 16   Ht 1.842 m (6' 0.5\")   Wt 113.4 kg (250 lb)   SpO2 97%   BMI 33.44 kg/m      Physical Exam    GENERAL APPEARANCE: alert, active and no distress     EYES: EOMI,  PERRL     NECK: no adenopathy, no asymmetry, masses, or scars and thyroid normal to palpation     RESP: lungs clear to auscultation - no rales, rhonchi or wheezes     CV: regular rates and rhythm, normal S1 S2, no S3 or S4 and no murmur, click or rub     MS: extremities normal- no gross deformities noted, no evidence of inflammation in joints, FROM in all extremities.     SKIN: no suspicious lesions or rashes     NEURO: Normal strength and tone, sensory exam grossly normal, mentation intact and speech normal     PSYCH: mentation appears normal. and affect normal/bright     LYMPHATICS: No cervical adenopathy    Recent Labs   Lab Test 11/18/20  0818      POTASSIUM 3.7   CR 1.02        Diagnostics:  No labs were ordered during this visit.   No EKG required, no history of coronary heart disease, significant arrhythmia, peripheral arterial disease or other structural heart disease.    Revised Cardiac Risk Index (RCRI):  The patient has the following serious cardiovascular risks for perioperative complications:   - Cerebrovascular Disease (TIA or CVA) = 1 point     RCRI Interpretation: 1 point: Class II (low risk - 0.9% complication rate)           Signed Electronically by: Hema Adams PA-C  Copy of this evaluation report is provided to requesting physician.      "

## 2021-08-12 ENCOUNTER — OFFICE VISIT (OUTPATIENT)
Dept: FAMILY MEDICINE | Facility: CLINIC | Age: 46
End: 2021-08-12
Payer: COMMERCIAL

## 2021-08-12 VITALS
OXYGEN SATURATION: 97 % | DIASTOLIC BLOOD PRESSURE: 84 MMHG | TEMPERATURE: 97.3 F | HEIGHT: 73 IN | RESPIRATION RATE: 16 BRPM | BODY MASS INDEX: 33.13 KG/M2 | SYSTOLIC BLOOD PRESSURE: 130 MMHG | HEART RATE: 84 BPM | WEIGHT: 250 LBS

## 2021-08-12 DIAGNOSIS — Z01.818 PREOP GENERAL PHYSICAL EXAM: Primary | ICD-10-CM

## 2021-08-12 DIAGNOSIS — K40.90 RIGHT INGUINAL HERNIA: ICD-10-CM

## 2021-08-12 DIAGNOSIS — I77.74 VERTEBRAL ARTERY DISSECTION (H): ICD-10-CM

## 2021-08-12 PROCEDURE — 99214 OFFICE O/P EST MOD 30 MIN: CPT | Performed by: PHYSICIAN ASSISTANT

## 2021-08-12 ASSESSMENT — MIFFLIN-ST. JEOR: SCORE: 2064.93

## 2021-08-16 ENCOUNTER — LAB (OUTPATIENT)
Dept: LAB | Facility: CLINIC | Age: 46
End: 2021-08-16
Payer: COMMERCIAL

## 2021-08-16 DIAGNOSIS — Z11.59 ENCOUNTER FOR SCREENING FOR OTHER VIRAL DISEASES: ICD-10-CM

## 2021-08-16 PROCEDURE — U0003 INFECTIOUS AGENT DETECTION BY NUCLEIC ACID (DNA OR RNA); SEVERE ACUTE RESPIRATORY SYNDROME CORONAVIRUS 2 (SARS-COV-2) (CORONAVIRUS DISEASE [COVID-19]), AMPLIFIED PROBE TECHNIQUE, MAKING USE OF HIGH THROUGHPUT TECHNOLOGIES AS DESCRIBED BY CMS-2020-01-R: HCPCS

## 2021-08-16 PROCEDURE — U0005 INFEC AGEN DETEC AMPLI PROBE: HCPCS

## 2021-08-17 ENCOUNTER — ANESTHESIA EVENT (OUTPATIENT)
Dept: SURGERY | Facility: CLINIC | Age: 46
End: 2021-08-17
Payer: COMMERCIAL

## 2021-08-17 LAB — SARS-COV-2 RNA RESP QL NAA+PROBE: NEGATIVE

## 2021-08-18 ENCOUNTER — ANESTHESIA (OUTPATIENT)
Dept: SURGERY | Facility: CLINIC | Age: 46
End: 2021-08-18
Payer: COMMERCIAL

## 2021-08-18 ENCOUNTER — APPOINTMENT (OUTPATIENT)
Dept: SURGERY | Facility: PHYSICIAN GROUP | Age: 46
End: 2021-08-18
Payer: COMMERCIAL

## 2021-08-18 ENCOUNTER — HOSPITAL ENCOUNTER (OUTPATIENT)
Facility: CLINIC | Age: 46
Discharge: HOME | End: 2021-08-18
Attending: SURGERY | Admitting: SURGERY
Payer: COMMERCIAL

## 2021-08-18 VITALS
DIASTOLIC BLOOD PRESSURE: 77 MMHG | BODY MASS INDEX: 33.62 KG/M2 | HEART RATE: 63 BPM | WEIGHT: 253.7 LBS | OXYGEN SATURATION: 97 % | HEIGHT: 73 IN | TEMPERATURE: 97.7 F | RESPIRATION RATE: 16 BRPM | SYSTOLIC BLOOD PRESSURE: 134 MMHG

## 2021-08-18 DIAGNOSIS — K40.90 RIGHT INGUINAL HERNIA: ICD-10-CM

## 2021-08-18 DIAGNOSIS — K40.20 BILATERAL INGUINAL HERNIA WITHOUT OBSTRUCTION OR GANGRENE, RECURRENCE NOT SPECIFIED: Primary | ICD-10-CM

## 2021-08-18 PROCEDURE — 360N000080 HC SURGERY LEVEL 7, PER MIN: Performed by: SURGERY

## 2021-08-18 PROCEDURE — C1781 MESH (IMPLANTABLE): HCPCS | Performed by: SURGERY

## 2021-08-18 PROCEDURE — 370N000017 HC ANESTHESIA TECHNICAL FEE, PER MIN: Performed by: SURGERY

## 2021-08-18 PROCEDURE — 49650 LAP ING HERNIA REPAIR INIT: CPT | Mod: 50 | Performed by: PHYSICIAN ASSISTANT

## 2021-08-18 PROCEDURE — 250N000011 HC RX IP 250 OP 636: Performed by: SURGERY

## 2021-08-18 PROCEDURE — 999N000141 HC STATISTIC PRE-PROCEDURE NURSING ASSESSMENT: Performed by: SURGERY

## 2021-08-18 PROCEDURE — 250N000025 HC SEVOFLURANE, PER MIN: Performed by: SURGERY

## 2021-08-18 PROCEDURE — 250N000011 HC RX IP 250 OP 636: Performed by: NURSE ANESTHETIST, CERTIFIED REGISTERED

## 2021-08-18 PROCEDURE — 710N000012 HC RECOVERY PHASE 2, PER MINUTE: Performed by: SURGERY

## 2021-08-18 PROCEDURE — 250N000013 HC RX MED GY IP 250 OP 250 PS 637: Performed by: PHYSICIAN ASSISTANT

## 2021-08-18 PROCEDURE — 250N000009 HC RX 250: Performed by: SURGERY

## 2021-08-18 PROCEDURE — 250N000009 HC RX 250: Performed by: NURSE ANESTHETIST, CERTIFIED REGISTERED

## 2021-08-18 PROCEDURE — 710N000009 HC RECOVERY PHASE 1, LEVEL 1, PER MIN: Performed by: SURGERY

## 2021-08-18 PROCEDURE — 272N000001 HC OR GENERAL SUPPLY STERILE: Performed by: SURGERY

## 2021-08-18 PROCEDURE — 258N000003 HC RX IP 258 OP 636: Performed by: NURSE ANESTHETIST, CERTIFIED REGISTERED

## 2021-08-18 PROCEDURE — S2900 ROBOTIC SURGICAL SYSTEM: HCPCS | Performed by: SURGERY

## 2021-08-18 PROCEDURE — 49650 LAP ING HERNIA REPAIR INIT: CPT | Mod: 50 | Performed by: SURGERY

## 2021-08-18 DEVICE — LAPAROSCOPIC SELF-FIXATING MESH POLYESTER WITH POLYLACTIC ACID GRIPS AND COLLAGEN FILM
Type: IMPLANTABLE DEVICE | Site: INGUINAL | Status: FUNCTIONAL
Brand: PROGRIP

## 2021-08-18 RX ORDER — GLYCOPYRROLATE 0.2 MG/ML
INJECTION, SOLUTION INTRAMUSCULAR; INTRAVENOUS PRN
Status: DISCONTINUED | OUTPATIENT
Start: 2021-08-18 | End: 2021-08-18

## 2021-08-18 RX ORDER — FENTANYL CITRATE 50 UG/ML
INJECTION, SOLUTION INTRAMUSCULAR; INTRAVENOUS PRN
Status: DISCONTINUED | OUTPATIENT
Start: 2021-08-18 | End: 2021-08-18

## 2021-08-18 RX ORDER — OXYCODONE HYDROCHLORIDE 5 MG/1
5 TABLET ORAL EVERY 4 HOURS PRN
Status: DISCONTINUED | OUTPATIENT
Start: 2021-08-18 | End: 2021-08-18 | Stop reason: HOSPADM

## 2021-08-18 RX ORDER — FENTANYL CITRATE 0.05 MG/ML
50 INJECTION, SOLUTION INTRAMUSCULAR; INTRAVENOUS
Status: DISCONTINUED | OUTPATIENT
Start: 2021-08-18 | End: 2021-08-18 | Stop reason: HOSPADM

## 2021-08-18 RX ORDER — HYDROMORPHONE HCL IN WATER/PF 6 MG/30 ML
0.4 PATIENT CONTROLLED ANALGESIA SYRINGE INTRAVENOUS EVERY 5 MIN PRN
Status: DISCONTINUED | OUTPATIENT
Start: 2021-08-18 | End: 2021-08-18 | Stop reason: HOSPADM

## 2021-08-18 RX ORDER — ONDANSETRON 2 MG/ML
4 INJECTION INTRAMUSCULAR; INTRAVENOUS EVERY 30 MIN PRN
Status: DISCONTINUED | OUTPATIENT
Start: 2021-08-18 | End: 2021-08-18 | Stop reason: HOSPADM

## 2021-08-18 RX ORDER — FENTANYL CITRATE 0.05 MG/ML
50 INJECTION, SOLUTION INTRAMUSCULAR; INTRAVENOUS EVERY 5 MIN PRN
Status: DISCONTINUED | OUTPATIENT
Start: 2021-08-18 | End: 2021-08-18 | Stop reason: HOSPADM

## 2021-08-18 RX ORDER — MEPERIDINE HYDROCHLORIDE 25 MG/ML
12.5 INJECTION INTRAMUSCULAR; INTRAVENOUS; SUBCUTANEOUS
Status: DISCONTINUED | OUTPATIENT
Start: 2021-08-18 | End: 2021-08-18 | Stop reason: HOSPADM

## 2021-08-18 RX ORDER — HYDROCODONE BITARTRATE AND ACETAMINOPHEN 5; 325 MG/1; MG/1
1 TABLET ORAL
Status: COMPLETED | OUTPATIENT
Start: 2021-08-18 | End: 2021-08-18

## 2021-08-18 RX ORDER — DEXAMETHASONE SODIUM PHOSPHATE 4 MG/ML
INJECTION, SOLUTION INTRA-ARTICULAR; INTRALESIONAL; INTRAMUSCULAR; INTRAVENOUS; SOFT TISSUE PRN
Status: DISCONTINUED | OUTPATIENT
Start: 2021-08-18 | End: 2021-08-18

## 2021-08-18 RX ORDER — CEFAZOLIN SODIUM 2 G/100ML
2 INJECTION, SOLUTION INTRAVENOUS
Status: COMPLETED | OUTPATIENT
Start: 2021-08-18 | End: 2021-08-18

## 2021-08-18 RX ORDER — SODIUM CHLORIDE, SODIUM LACTATE, POTASSIUM CHLORIDE, CALCIUM CHLORIDE 600; 310; 30; 20 MG/100ML; MG/100ML; MG/100ML; MG/100ML
INJECTION, SOLUTION INTRAVENOUS CONTINUOUS PRN
Status: DISCONTINUED | OUTPATIENT
Start: 2021-08-18 | End: 2021-08-18

## 2021-08-18 RX ORDER — SENNA AND DOCUSATE SODIUM 50; 8.6 MG/1; MG/1
1 TABLET, FILM COATED ORAL AT BEDTIME
Qty: 30 TABLET | Refills: 0 | Status: SHIPPED | OUTPATIENT
Start: 2021-08-18 | End: 2021-10-13

## 2021-08-18 RX ORDER — MAGNESIUM HYDROXIDE 1200 MG/15ML
LIQUID ORAL PRN
Status: DISCONTINUED | OUTPATIENT
Start: 2021-08-18 | End: 2021-08-18 | Stop reason: HOSPADM

## 2021-08-18 RX ORDER — NEOSTIGMINE METHYLSULFATE 1 MG/ML
VIAL (ML) INJECTION PRN
Status: DISCONTINUED | OUTPATIENT
Start: 2021-08-18 | End: 2021-08-18

## 2021-08-18 RX ORDER — LIDOCAINE HYDROCHLORIDE 20 MG/ML
INJECTION, SOLUTION INFILTRATION; PERINEURAL PRN
Status: DISCONTINUED | OUTPATIENT
Start: 2021-08-18 | End: 2021-08-18

## 2021-08-18 RX ORDER — ONDANSETRON 4 MG/1
4 TABLET, ORALLY DISINTEGRATING ORAL EVERY 30 MIN PRN
Status: DISCONTINUED | OUTPATIENT
Start: 2021-08-18 | End: 2021-08-18 | Stop reason: HOSPADM

## 2021-08-18 RX ORDER — KETOROLAC TROMETHAMINE 30 MG/ML
INJECTION, SOLUTION INTRAMUSCULAR; INTRAVENOUS PRN
Status: DISCONTINUED | OUTPATIENT
Start: 2021-08-18 | End: 2021-08-18

## 2021-08-18 RX ORDER — CEFAZOLIN SODIUM 2 G/100ML
2 INJECTION, SOLUTION INTRAVENOUS SEE ADMIN INSTRUCTIONS
Status: DISCONTINUED | OUTPATIENT
Start: 2021-08-18 | End: 2021-08-18 | Stop reason: HOSPADM

## 2021-08-18 RX ORDER — HYDROCODONE BITARTRATE AND ACETAMINOPHEN 5; 325 MG/1; MG/1
1-2 TABLET ORAL EVERY 4 HOURS PRN
Qty: 15 TABLET | Refills: 0 | Status: SHIPPED | OUTPATIENT
Start: 2021-08-18 | End: 2021-08-21

## 2021-08-18 RX ORDER — PROPOFOL 10 MG/ML
INJECTION, EMULSION INTRAVENOUS PRN
Status: DISCONTINUED | OUTPATIENT
Start: 2021-08-18 | End: 2021-08-18

## 2021-08-18 RX ORDER — SODIUM CHLORIDE, SODIUM LACTATE, POTASSIUM CHLORIDE, CALCIUM CHLORIDE 600; 310; 30; 20 MG/100ML; MG/100ML; MG/100ML; MG/100ML
INJECTION, SOLUTION INTRAVENOUS CONTINUOUS
Status: DISCONTINUED | OUTPATIENT
Start: 2021-08-18 | End: 2021-08-18 | Stop reason: HOSPADM

## 2021-08-18 RX ORDER — BUPIVACAINE HYDROCHLORIDE AND EPINEPHRINE 2.5; 5 MG/ML; UG/ML
INJECTION, SOLUTION INFILTRATION; PERINEURAL PRN
Status: DISCONTINUED | OUTPATIENT
Start: 2021-08-18 | End: 2021-08-18 | Stop reason: HOSPADM

## 2021-08-18 RX ORDER — ONDANSETRON 2 MG/ML
INJECTION INTRAMUSCULAR; INTRAVENOUS PRN
Status: DISCONTINUED | OUTPATIENT
Start: 2021-08-18 | End: 2021-08-18

## 2021-08-18 RX ADMIN — PROPOFOL 200 MG: 10 INJECTION, EMULSION INTRAVENOUS at 12:37

## 2021-08-18 RX ADMIN — NEOSTIGMINE METHYLSULFATE 5 MG: 1 INJECTION, SOLUTION INTRAVENOUS at 13:34

## 2021-08-18 RX ADMIN — ROCURONIUM BROMIDE 40 MG: 10 INJECTION INTRAVENOUS at 12:39

## 2021-08-18 RX ADMIN — ROCURONIUM BROMIDE 20 MG: 10 INJECTION INTRAVENOUS at 12:57

## 2021-08-18 RX ADMIN — LIDOCAINE HYDROCHLORIDE 100 MG: 20 INJECTION, SOLUTION INFILTRATION; PERINEURAL at 12:37

## 2021-08-18 RX ADMIN — HYDROCODONE BITARTRATE AND ACETAMINOPHEN 1 TABLET: 5; 325 TABLET ORAL at 14:42

## 2021-08-18 RX ADMIN — ONDANSETRON 4 MG: 2 INJECTION INTRAMUSCULAR; INTRAVENOUS at 13:32

## 2021-08-18 RX ADMIN — CEFAZOLIN SODIUM 2 G: 2 INJECTION, SOLUTION INTRAVENOUS at 12:43

## 2021-08-18 RX ADMIN — KETOROLAC TROMETHAMINE 30 MG: 30 INJECTION, SOLUTION INTRAMUSCULAR at 13:31

## 2021-08-18 RX ADMIN — PROPOFOL 50 MG: 10 INJECTION, EMULSION INTRAVENOUS at 12:39

## 2021-08-18 RX ADMIN — GLYCOPYRROLATE 0.2 MG: 0.2 INJECTION, SOLUTION INTRAMUSCULAR; INTRAVENOUS at 12:47

## 2021-08-18 RX ADMIN — FENTANYL CITRATE 50 MCG: 50 INJECTION, SOLUTION INTRAMUSCULAR; INTRAVENOUS at 12:37

## 2021-08-18 RX ADMIN — FENTANYL CITRATE 50 MCG: 50 INJECTION, SOLUTION INTRAMUSCULAR; INTRAVENOUS at 12:55

## 2021-08-18 RX ADMIN — GLYCOPYRROLATE 0.7 MG: 0.2 INJECTION, SOLUTION INTRAMUSCULAR; INTRAVENOUS at 13:34

## 2021-08-18 RX ADMIN — HYDROMORPHONE HYDROCHLORIDE 0.5 MG: 1 INJECTION, SOLUTION INTRAMUSCULAR; INTRAVENOUS; SUBCUTANEOUS at 12:52

## 2021-08-18 RX ADMIN — DEXAMETHASONE SODIUM PHOSPHATE 4 MG: 4 INJECTION, SOLUTION INTRA-ARTICULAR; INTRALESIONAL; INTRAMUSCULAR; INTRAVENOUS; SOFT TISSUE at 12:48

## 2021-08-18 RX ADMIN — MIDAZOLAM 2 MG: 1 INJECTION INTRAMUSCULAR; INTRAVENOUS at 12:34

## 2021-08-18 RX ADMIN — SODIUM CHLORIDE, POTASSIUM CHLORIDE, SODIUM LACTATE AND CALCIUM CHLORIDE: 600; 310; 30; 20 INJECTION, SOLUTION INTRAVENOUS at 12:34

## 2021-08-18 ASSESSMENT — ENCOUNTER SYMPTOMS
DYSRHYTHMIAS: 0
ORTHOPNEA: 0
SEIZURES: 1

## 2021-08-18 ASSESSMENT — MIFFLIN-ST. JEOR: SCORE: 2081.72

## 2021-08-18 NOTE — DISCHARGE INSTRUCTIONS
Same Day Surgery Discharge Instructions for  Sedation and General Anesthesia       It's not unusual to feel dizzy, light-headed or faint for up to 24 hours after surgery or while taking pain medication.  If you have these symptoms: sit for a few minutes before standing and have someone assist you when you get up to walk or use the bathroom.      You should rest and relax for the next 24 hours. We recommend you make arrangements to have an adult stay with you for at least 24 hours after your discharge.  Avoid hazardous and strenuous activity.      DO NOT DRIVE any vehicle or operate mechanical equipment for 24 hours following the end of your surgery.  Even though you may feel normal, your reactions may be affected by the medication you have received.      Do not drink alcoholic beverages for 24 hours following surgery.       Slowly progress to your regular diet as you feel able. It's not unusual to feel nauseated and/or vomit after receiving anesthesia.  If you develop these symptoms, drink clear liquids (apple juice, ginger ale, broth, 7-up, etc. ) until you feel better.  If your nausea and vomiting persists for 24 hours, please notify your surgeon.        All narcotic pain medications, along with inactivity and anesthesia, can cause constipation. Drinking plenty of liquids and increasing fiber intake will help.      For any questions of a medical nature, call your surgeon.      Do not make important decisions for 24 hours.      If you had general anesthesia, you may have a sore throat for a couple of days related to the breathing tube used during surgery.  You may use Cepacol lozenges to help with this discomfort.  If it worsens or if you develop a fever, contact your surgeon.       If you feel your pain is not well managed with the pain medications prescribed by your surgeon, please contact your surgeon's office to let them know so they can address your concerns.       CoVid 19 Information    We want to give you  information regarding Covid. Please consult your primary care provider with any questions you might have.     Patient who have symptoms (cough, fever, or shortness of breath), need to isolate for 7 days from when symptoms started OR 72 hours after fever resolves (without fever reducing medications) AND improvement of respiratory symptoms (whichever is longer).      Isolate yourself at home (in own room/own bathroom if possible)    Do Not allow any visitors    Do Not go to work or school    Do Not go to [employer],  centers, shopping, or other public places.    Do Not shake hands.    Avoid close and intimate contact with others (hugging, kissing).    Follow CDC recommendations for household cleaning of frequently touched services.     After the initial 7 days, continue to isolate yourself from household members as much as possible. To continue decrease the risk of community spread and exposure, you and any members of your household should limit activities in public for 14 days after starting home isolation.     You can reference the following CDC link for helpful home isolation/care tips:  https://www.cdc.gov/coronavirus/2019-ncov/downloads/10Things.pdf    Protect Others:    Cover Your Mouth and Nose with a mask, disposable tissue or wash cloth to avoid spreading germs to others.    Wash your hands and face frequently with soap and water    Call Your Primary Doctor If: Breathing difficulty develops or you become worse.    For more information about COVID19 and options for caring for yourself at home, please visit the CDC website at https://www.cdc.gov/coronavirus/2019-ncov/about/steps-when-sick.html  For more options for care at [facility], please visit our website at [email or web address]          **If you have questions or concerns about your procedure,   call Dr. Tilley at [phone]**    **Because you had anesthesia today and your history of sleep apnea, it is extremely  important that you use your CPAP machine for the next 24 hours while napping or sleeping.**    Today you received Toradol, an antiinflammatory medication similar to Ibuprofen.  You should not take other antiinflammatory medication, such as Ibuprofen, Motrin, Advil, Aleve, Naprosyn, etc until 7:30pm.         [facility]  Discharge Instructions: Post-Operative Laparoscopic Robotic Inguinal Hernia Repair    ACTIVITY    Take frequent, short walks and increase your activity gradually.      Avoid strenuous physical activity or heavy lifting greater than 15 lbs. for 3-4 weeks.  You may climb stairs.    You may drive without restrictions when you are not using any prescription pain medication and feel comfortable in a car.    You may return to work/school when you are comfortable without any prescription pain medication.    WOUND CARE    You may remove your outer dressing or Band-Aids and shower 48 hours after the surgery.  Pat your incisions dry and leave them open to air.  Re-apply dressing (Band-Aids or gauze/tape) as needed for comfort or drainage.    You may have steri-strips (looks like white tape) on your incision.  You may peel off the steri-strips 2 weeks after your surgery if they have not peeled off on their own.     Do not soak your incisions in a tub or pool for 2 weeks.     Do not apply any lotions, creams, or ointments to your incisions.    A ridge under your incisions is normal and will gradually resolve.    DIET    Start with liquids, then gradually resume your regular diet as tolerated.     Drink plenty of fluids to stay hydrated.    PAIN    Expect some tenderness and discomfort at the incision site(s).  Use the prescribed pain medication at your discretion.  Expect gradual resolution of your pain over several days.    You may take ibuprofen with food (unless you have been told not to) or acetaminophen/Tylenol instead of or in addition to your prescribed pain medication.  If  you are taking Norco or Percocet, do not take any additional acetaminophen/Tylenol.    Do not drink alcohol or drive while you are taking pain medications.    You may apply ice to your incisions in 20 minute intervals as needed for the next 48 hours.  After that time, consider switching to heat if you prefer.    EXPECTATIONS    You may notice air in your scrotum and/or penis after the surgery.  This is due to the gas used during the surgery.  You can expect some swelling and bruising involving the scrotum and/or penis as well as numbness.  These symptoms are expected and should gradually resolve in the next few days.  You may use ice to help with the swelling and try placing a rolled hand towel below your scrotum to help alleviate scrotal discomfort.  If you develop significant testicular or penile pain, please call our office and speak with a nurse.    Pain medications can cause constipation.  Limit use when possible.  Take over the counter stool softener/stimulant, such as Colace or Senna, 1-2 times a day with plenty of water.  You may take a mild over the counter laxative, such as Miralax or a suppository, as needed.  You may take 1 oz. (2 tablespoons) Milk of Magnesia the evening following surgery to encourage bowel movement.  You may discontinue these medications once you are having regular bowel movements and/or are no longer taking your narcotic pain medication.     You may have shoulder or upper back discomfort due to the gas used in surgery.  This is temporary and should resolve in 48-72 hours.  Short, frequent walks may help with this.    If you are unable to urinate, or feel as though you are not emptying your bladder adequately, we recommend you call our office and/or seek care at an ER or Urgent Care facility if after hours.    FOLLOW UP    Our office will contact you in approximately 2-3 weeks to check on your progress and answer any questions you may have.  If you are doing well, you will not need to  return for a follow up appointment.  If any concerns are identified over the phone, we will help you make an appointment to see a provider.     If you have not received a phone call, have any questions or concerns, or would like to be seen, please call us at [phone] and ask to speak with our nurse.  We are located at [address] [place] [address].    CALL OUR OFFICE -[phone] IF YOU HAVE:     Chills or fever above 101 F.    Increased redness, warmth, or drainage at your incisions.    Significant bleeding.    Pain not relieved by your pain medication or rest.    Increasing pain after the first 48 hours.    Any other concerns or questions.           Revised September 2020

## 2021-08-18 NOTE — BRIEF OP NOTE
"[facility]    Brief Operative Note    Pre-operative diagnosis: Right inguinal hernia [K40.90]  Post-operative diagnosis Bilateral Inguinal Hernias    Procedure: Procedure(s):  ROBOT-ASSISTED BILATERAL INGUINAL HERNIA REPAIR with mesh    Surgeon: Surgeon(s) and Role:     * Dwayne Tilley MD - Primary     * Guevara Garcia PA-C - Assisting     Anesthesia: General   Estimated blood loss: Less than 10 ml  Drains: None  Specimens: * No specimens in log *  Findings:   None.  Small direct hernia to Left side as well.  Complications: None.    Implants:   Implant Name Type Inv. Item Serial No.  Lot No. LRB No. Used Action   MESH PROGRIP LAPAROSCOPIC 5.9X3.9\" PARIETEX SELF-FIX [id number] - [id number] Mesh MESH PROGRIP LAPAROSCOPIC 5.9X3.9\" PARIETEX SELF-FIX [id number]  [employer]OVID[employer] [id number] Left 1 Implanted   MESH PROGRIP LAPAROSCOPIC 5.9X3.9\" PARIETEX SELF-FIX [id number] - [id number] Mesh MESH PROGRIP LAPAROSCOPIC 5.9X3.9\" PARIETEX SELF-FIX [id number]  [employer] [id number] Right 1 Implanted           "

## 2021-08-18 NOTE — ANESTHESIA POSTPROCEDURE EVALUATION
Patient: Vahe Haas    Procedure(s):  ROBOT-ASSISTED BILATERAL INGUINAL HERNIA REPAIR    Diagnosis:Right inguinal hernia [K40.90]  Diagnosis Additional Information: No value filed.    Anesthesia Type:  General    Note:  Disposition: Outpatient   Postop Pain Control: Uneventful            Sign Out: Well controlled pain   PONV: No   Neuro/Psych: Uneventful            Sign Out: Acceptable/Baseline neuro status   Airway/Respiratory: Uneventful            Sign Out: Acceptable/Baseline resp. status   CV/Hemodynamics: Uneventful            Sign Out: Acceptable CV status; No obvious hypovolemia; No obvious fluid overload   Other NRE: NONE   DID A NON-ROUTINE EVENT OCCUR? No           Last vitals:  Vitals Value Taken Time   /100 08/18/21 1500   Temp     Pulse 71 08/18/21 1501   Resp 21 08/18/21 1501   SpO2 94 % 08/18/21 1500   Vitals shown include unvalidated device data.    Electronically Signed By: Satish Manley MD  August 18, 2021  5:26 PM

## 2021-08-18 NOTE — ANESTHESIA PREPROCEDURE EVALUATION
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     No history of anesthetic complications       ROS/MED HX  ENT/Pulmonary:     (+) sleep apnea, uses CPAP, allergic rhinitis,     Neurologic:     (+) seizures (currently on no medications for this), last seizure: , features: Grand Mal, CVA (hx of vertebal artery dissection),  (-) migraines   Cardiovascular:     (+) hypertension (elevated BP without diagnosis of HTN)----- (-) CHF, orthopnea/PND and arrhythmias   METS/Exercise Tolerance:     Hematologic:  - neg hematologic  ROS     Musculoskeletal:  - neg musculoskeletal ROS     GI/Hepatic: Comment: Bilateral inguinal hernia - neg GI/hepatic ROS  (-) GERD   Renal/Genitourinary:  - neg Renal ROS     Endo:  - neg endo ROS  (-) Type I DM and Type II DM   Psychiatric/Substance Use: Comment: insomnia    (+) psychiatric history depression     Infectious  Disease:  - neg infectious disease ROS     Malignancy:  - neg malignancy ROS     Other:            Physical Exam    Airway  airway exam normal      Mallampati: III   TM distance: > 3 FB   Neck ROM: full   Mouth opening: > 3 cm    Respiratory Devices and Support         Dental  no notable dental history         Cardiovascular   cardiovascular exam normal       Rhythm and rate: regular and normal     Pulmonary   pulmonary exam normal        breath sounds clear to auscultation           OUTSIDE LABS:  CBC:   Lab Results   Component Value Date    WBC 7.5 09/03/2018    HGB 15.4 09/03/2018    HCT 43.5 09/03/2018     09/03/2018     BMP:   Lab Results   Component Value Date     11/18/2020     07/16/2019    POTASSIUM 3.7 11/18/2020    POTASSIUM 4.1 07/16/2019    CHLORIDE 109 11/18/2020    CHLORIDE 111 (H) 07/16/2019    CO2 23 11/18/2020    CO2 25 07/16/2019    BUN 20 11/18/2020    BUN 16 07/16/2019    CR 1.02 11/18/2020    CR 1.20 07/16/2019     (H) 11/18/2020    GLC 98 07/16/2019     COAGS: No results found for: PTT, INR, FIBR  POC:   Lab Results   Component Value Date     (H) 09/03/2018     HEPATIC:   Lab Results   Component Value Date    ALBUMIN 3.9 11/18/2020    PROTTOTAL 7.0 11/18/2020    ALT 50 11/18/2020    AST 23 11/18/2020    ALKPHOS 90 11/18/2020    BILITOTAL 0.4 11/18/2020     OTHER:   Lab Results   Component Value Date    A1C 5.6 09/03/2018    KHADIJAH 8.6 11/18/2020    TSH 1.19 12/23/2010       Anesthesia Plan    ASA Status:  3   NPO Status:  NPO Appropriate    Anesthesia Type: General.     - Airway: ETT   Induction: Propofol.   Maintenance: Balanced.   Techniques and Equipment:     - Airway: Video-Laryngoscope         Consents    Anesthesia Plan(s) and associated risks, benefits, and realistic alternatives discussed. Questions answered and patient/representative(s) expressed understanding.     - Discussed with:  Patient         Postoperative Care    Pain management: IV analgesics.    PONV prophylaxis: Ondansetron (or other 5HT-3), Dexamethasone or Solumedrol     Comments:                Satish Manley MD

## 2021-08-18 NOTE — ANESTHESIA CARE TRANSFER NOTE
Patient: Vahe Haas    Procedure(s):  ROBOT-ASSISTED BILATERAL INGUINAL HERNIA REPAIR    Diagnosis: Right inguinal hernia [K40.90]  Diagnosis Additional Information: No value filed.    Anesthesia Type:   General     Note:    Oropharynx: oropharynx clear of all foreign objects and spontaneously breathing  Level of Consciousness: drowsy  Oxygen Supplementation: face mask  Level of Supplemental Oxygen (L/min / FiO2): 8  Independent Airway: airway patency satisfactory and stable  Dentition: dentition unchanged  Vital Signs Stable: post-procedure vital signs reviewed and stable  Report to RN Given: handoff report given  Patient transferred to: PACU  Comments: Neuromuscular blockade reversed after TOF 4/4, spontaneous respirations, adequate tidal volumes, oropharynx suctioned with soft flexible catheter, extubated atraumatically, extubated with suction, airway patent after extubation.  Oxygen via facemask at 8 liters per minute to PACU. Oxygen tubing connected to wall O2 in PACU, SpO2, NiBP, and EKG monitors and alarms on and functioning, report on patient's clinical status given to PACU RN, RN questions answered.     Handoff Report: Identifed the Patient, Identified the Reponsible Provider, Reviewed the pertinent medical history, Discussed the surgical course, Reviewed Intra-OP anesthesia mangement and issues during anesthesia, Set expectations for post-procedure period and Allowed opportunity for questions and acknowledgement of understanding      Vitals:  Vitals Value Taken Time   BP     Temp     Pulse 76 08/18/21 1358   Resp 14 08/18/21 1358   SpO2 99 % 08/18/21 1358   Vitals shown include unvalidated device data.    Electronically Signed By: JANKI Orlando CRNA  August 18, 2021  1:59 PM

## 2021-08-18 NOTE — OP NOTE
General Surgery Operative Note    PREOPERATIVE DIAGNOSIS:  Bilateral inguinal hernia    POSTOPERATIVE DIAGNOSIS: Bilateral inguinal hernia    PROCEDURE:  Robotic assisted bilateral inguinal hernia repair with mesh    ANESTHESIA:  General.    SURGEON:  Dwayne Tilley M.D.    ASSISTANT:   Guevara Garcia PA-C    ESTIMATED BLOOD LOSS:  5 cc    INTRAOPERATIVE FINDINGS:  Right- Large indirect and small direct hernia. Left- Moderate direct hernia    OPERATIVE INDICATIONS: Mr. Haas has a symptomatic bilateral inguinal hernias. Options were discussed and it was elected to proceed with robotic assisted repair. Potential risks of bleeding, infection, neurovascular injury to the vas deferens or testicle, recurrent hernia, chronic pain were all reviewed in detail and he wished to proceed.     DESCRIPTION OF PROCEDURE: The patient was taken to the operating suite and uneventfully endotracheally intubated. The abdomen and groin were prepped and draped in the usual sterile fashion. Surgeon initiated timeout was performed verifying the correct patient, procedure, site, and surgeon. All in the room were in agreement.  A 8 mm incision was made above the umbilicus.  The varies needle was carefully inserted using the drop test without difficulty.  The abdomen was insufflated to a pressure of 15 which the patient tolerated well.  The camera was inserted and revealed no injuries from trocar placement.  Two 8 mm trocars were placed on the lateral abdominal wall under direct visualization.  The robot was docked in the usual fashion.  A  forcep and scissors were placed.  We began our dissection on the right side.  Cautery dissection was used to open the peritoneal plane.  Using combination of sharp and blunt dissection, a plane was created behind the abdominal wall and the underlying peritoneum. This was done in a blunt and atraumatic fashion. The inferior epigastric vessels were visualized running along the underside of the  abdominal wall.  The epigastric vessels were followed down until we were able to identify the internal ring. The spermatic cord was then meticulously dissected preserving all of the nerve and blood vessels. A  large Indirect and small direct hernia was found and reduced without difficulty. Coopers ligament was then cleared without significant bleeding. The dissection was continued until we had an excellent space in the preperitoneal area.  A piece of progrip was then placed within this space with good coverage over the entire hernia. Attention was then turned to the opposite side.  Cautery was used to open the peritoneal space.  Blunt and cautery dissection was used to open the preperitoneal plane.  The spermatic cord was meticulously dissected preserving all of the nerve and blood vessels. A  moderate Direct hernia was found and reduced without difficulty. Coopers ligament was then cleared without significant bleeding. The dissection was continued until we had an excellent space in the preperitoneal area.  A piece of progrip was then placed within this space with good coverage over the entire hernia.  The peritoneum was closed on both sides with a running V lock suture.  There were no holes or defects within the peritoneum.  Final inspection revealed no bleeding or other abnormalities.  All trocars were removed under direct visualization. Marcaine was once again injected to the javier-wound area. The incisions were completely dry without any bleeding. The skin was closed with a 4.0 Monocryl in a subcuticular fashion. Steri-Strips were applied. All Needle and sponge counts were correct.  A debriefing was performed verifying the correct procedure, sponge/instrument count, specimen identification, and blood loss. The patient tolerated the procedure well and was taken to the recovery room in stable condition. The physician assistant was medically necessary to assist in prepping, positioning, camera operation,  retraction/exposure, and closure of the port sites.       Dwayne Tilley M.D.    Please cc note to referring provider and PCP

## 2021-09-04 ENCOUNTER — HEALTH MAINTENANCE LETTER (OUTPATIENT)
Age: 46
End: 2021-09-04

## 2021-09-07 ENCOUNTER — TELEPHONE (OUTPATIENT)
Dept: SURGERY | Facility: CLINIC | Age: 46
End: 2021-09-07

## 2021-09-07 NOTE — TELEPHONE ENCOUNTER
"[facility]  Post op call note     Vahe Haas was called for an update regarding his recovery.  He underwent a robotic lap bilateral inguinal hernia by Dr. Tilley on August / 18 / 2021.  Today he tells me he is \"doing great!\" and denies any complaints.  He is eating a normal diet and his bowels are regular.  He states his wounds are healing well and denies any erythema or drainage at his wounds.     He was instructed to gradually resume all normal activities. The patient states all of his questions were answered and he agrees to follow up in clinic as needed.    Jitendra Garcia PA-C        Please route or send letter to:  Primary Care Provider (PCP)      "

## 2021-10-08 ENCOUNTER — TELEPHONE (OUTPATIENT)
Dept: FAMILY MEDICINE | Facility: CLINIC | Age: 46
End: 2021-10-08

## 2021-10-08 DIAGNOSIS — R45.4 IRRITABILITY: ICD-10-CM

## 2021-10-08 DIAGNOSIS — F33.1 MDD (MAJOR DEPRESSIVE DISORDER), RECURRENT EPISODE, MODERATE (H): ICD-10-CM

## 2021-10-08 RX ORDER — DULOXETIN HYDROCHLORIDE 30 MG/1
60 CAPSULE, DELAYED RELEASE ORAL DAILY
Qty: 60 CAPSULE | Refills: 0 | Status: SHIPPED | OUTPATIENT
Start: 2021-10-08 | End: 2021-10-13

## 2021-10-08 NOTE — TELEPHONE ENCOUNTER
Reason for Call:  Medication or medication refill:    Do you use a [facility]?  Name of the pharmacy and phone number for the current request:  [facility] [place] [address] [address]    Name of the medication requested: DULoxetine (CYMBALTA) 30 MG capsule    Pt states he will run out of medication today. He is aware he has an appt scheduled on 10/13 with .     Other request: NA    Can we leave a detailed message on this number? YES    Phone number patient can be reached at: Cell number on file:    Telephone Information:   Mobile [phone]     Best Time: any    Call taken on 10/8/2021 at 1:05 PM by Columba Sánchez

## 2021-10-12 NOTE — PROGRESS NOTES
Vahe is a 46 year old who is being evaluated via a billable video visit.      How would you like to obtain your AVS? MyChart  If the video visit is dropped, the invitation should be resent by: Text to cell phone: [phone]   Will anyone else be joining your video visit? No      Assessment & Plan       ICD-10-CM    1. Irritability  R45.4    2. MDD (major depressive disorder), recurrent episode, moderate (H)  F33.1    3. History of dissection of artery  Z86.79       MDD/irritability- improved sx's and less se's with the change to cymbalta in 8/21.  Feels like this is the best fit yet for symptom control and lack of sx's.  ED se's on wellbutrin, and sx's not as well controlled on the effexor.    Pt believes he's been getting 60mg capsules from the pharmacy- taking one daily.  Will cont cymbalta 60mg/day, refills sent.  F/u in ~6 months with physical and f/u med check.    H/o vertebral artery dissection in 9/18-   Per neurology recs, off asa since a year after incident.  Had rec prn follow-up.  Pt still with mild residual sx's, but most sx's resolved.               Return in about 6 months (around 4/13/2022) for Physical Exam, Depression follow-up.    Neida Benz MD  [facility]        Subjective   Vahe is a 46 year old who presents for the following health issues - MDD, irritability    HPI     Answers for HPI/ROS submitted by the patient on 10/13/2021  If you checked off any problems, how difficult have these problems made it for you to do your work, take care of things at home, or get along with other people?: Not difficult at all  PHQ9 TOTAL SCORE: 3  TERRA 7 TOTAL SCORE: 1      Depression Followup    How are you doing with your depression since your last visit? Good    Are you having other symptoms that might be associated with depression? No    Have you had a significant life event?  No     Are you feeling anxious or having panic attacks?   No    Do you have any concerns with your  use of alcohol or other drugs? No    8/21- switched from effexor to cymbalta.  Thinks it's good.  Did have hernia surgery in the middle.  Seems to be good...    According to his wife, she says it's better, less irritability.  He feels a 'higher threshold', things annoy him less.  Can control how he reacts to things better.  Able to step back and pause and not react.  Sexual se's- seems better, though wasn't active around the surgery.  Has had sex lately, though, and it's been better.      Exercise definitely helps with sleep.  Couldn't for awhile after surgery.  Back to exercising more now (although going to [place] x 10 days).  [place], concert, seeing friend, F1 race.  First trip in 18 months- excited.    H/o Vertebral artery dissection-  Per notes, last seen in '19, said to continue the asa for a year (he did stop the asa previously), f/u prn.  Therapies complete.  Sx's/se's now- right arm isn't 'as precise' as his left arm.  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  Some speech issues, but infrequently, and minimal if 'not looking for it.'    Social History     Tobacco Use     Smoking status: Never Smoker     Smokeless tobacco: Never Used   Vaping Use     Vaping Use: Never used   Substance Use Topics     Alcohol use: Yes     Alcohol/week: 0.0 standard drinks     Comment: social, 0-2 times per week     Drug use: No     PHQ 11/18/2020 7/8/2021 8/3/2021   PHQ-9 Total Score 4 5 7   Q9: Thoughts of better off dead/self-harm past 2 weeks Not at all Not at all Not at all     TERRA-7 SCORE 11/18/2020 7/8/2021 8/3/2021   Total Score - - 3 (minimal anxiety)   Total Score 1 8 3         How many servings of fruits and vegetables do you eat daily?  4 or more    On average, how many sweetened beverages do you drink each day (Examples: soda, juice, sweet tea, etc.  Do NOT count diet or artificially sweetened beverages)?   Only on the weekends cappuccino and orange juice      How many days per week do you exercise enough to make your heart beat faster? 5    How many minutes a day do you exercise enough to make your heart beat faster? 20 - 29  How many days per week do you miss taking your medication? 1    What makes it hard for you to take your medications?  remembering to take        Review of Systems   Constitutional, HEENT, cardiovascular, pulmonary, gi and gu systems are negative, except as otherwise noted.      Objective     Vitals:  No vitals were obtained today due to virtual visit.    Physical Exam   GENERAL: Healthy, alert and no distress  EYES: Eyes grossly normal to inspection.  No discharge or erythema, or obvious scleral/conjunctival abnormalities.  RESP: No audible wheeze, cough, or visible cyanosis.  No visible retractions or increased work of breathing.    SKIN: Visible skin clear. No significant rash, abnormal pigmentation or lesions.  NEURO: Cranial nerves grossly intact.  Mentation and speech appropriate for age.  PSYCH: Mentation appears normal, affect normal/bright, judgement and insight intact, normal speech and appearance well-groomed.    Lab on 08/16/2021   Component Date Value Ref Range Status     SARS CoV2 PCR 08/16/2021 Negative  Negative Final    NEGATIVE: SARS-CoV-2 (COVID-19) RNA not detected, presumed negative.             Video-Visit Details    Type of service:  Video Visit _switched to phone visit as audio wasn't very good    Video Start Time: 1:10 PM  Video End Time:1:25 PM    Originating Location (pt. Location): Other work    Distant Location (provider location):  [facility]     Platform used for Video Visit: Edwin

## 2021-10-13 ENCOUNTER — VIRTUAL VISIT (OUTPATIENT)
Dept: FAMILY MEDICINE | Facility: CLINIC | Age: 46
End: 2021-10-13
Payer: COMMERCIAL

## 2021-10-13 DIAGNOSIS — Z86.79 HISTORY OF DISSECTION OF ARTERY: ICD-10-CM

## 2021-10-13 DIAGNOSIS — R45.4 IRRITABILITY: Primary | ICD-10-CM

## 2021-10-13 DIAGNOSIS — F33.1 MDD (MAJOR DEPRESSIVE DISORDER), RECURRENT EPISODE, MODERATE (H): ICD-10-CM

## 2021-10-13 PROCEDURE — 99214 OFFICE O/P EST MOD 30 MIN: CPT | Mod: GT | Performed by: FAMILY MEDICINE

## 2021-10-13 PROCEDURE — 96127 BRIEF EMOTIONAL/BEHAV ASSMT: CPT | Mod: 95 | Performed by: FAMILY MEDICINE

## 2021-10-13 RX ORDER — DULOXETIN HYDROCHLORIDE 60 MG/1
60 CAPSULE, DELAYED RELEASE ORAL DAILY
Qty: 90 CAPSULE | Refills: 1 | Status: SHIPPED | OUTPATIENT
Start: 2021-10-13 | End: 2022-05-25

## 2021-10-13 ASSESSMENT — PATIENT HEALTH QUESTIONNAIRE - PHQ9
SUM OF ALL RESPONSES TO PHQ QUESTIONS 1-9: 3
SUM OF ALL RESPONSES TO PHQ QUESTIONS 1-9: 3
10. IF YOU CHECKED OFF ANY PROBLEMS, HOW DIFFICULT HAVE THESE PROBLEMS MADE IT FOR YOU TO DO YOUR WORK, TAKE CARE OF THINGS AT HOME, OR GET ALONG WITH OTHER PEOPLE: NOT DIFFICULT AT ALL

## 2021-10-13 ASSESSMENT — ANXIETY QUESTIONNAIRES
4. TROUBLE RELAXING: NOT AT ALL
GAD7 TOTAL SCORE: 1
7. FEELING AFRAID AS IF SOMETHING AWFUL MIGHT HAPPEN: NOT AT ALL
2. NOT BEING ABLE TO STOP OR CONTROL WORRYING: NOT AT ALL
5. BEING SO RESTLESS THAT IT IS HARD TO SIT STILL: NOT AT ALL
7. FEELING AFRAID AS IF SOMETHING AWFUL MIGHT HAPPEN: NOT AT ALL
GAD7 TOTAL SCORE: 1
3. WORRYING TOO MUCH ABOUT DIFFERENT THINGS: NOT AT ALL
1. FEELING NERVOUS, ANXIOUS, OR ON EDGE: NOT AT ALL
8. IF YOU CHECKED OFF ANY PROBLEMS, HOW DIFFICULT HAVE THESE MADE IT FOR YOU TO DO YOUR WORK, TAKE CARE OF THINGS AT HOME, OR GET ALONG WITH OTHER PEOPLE?: NOT DIFFICULT AT ALL
GAD7 TOTAL SCORE: 1
6. BECOMING EASILY ANNOYED OR IRRITABLE: SEVERAL DAYS

## 2021-10-14 ASSESSMENT — ANXIETY QUESTIONNAIRES: GAD7 TOTAL SCORE: 1

## 2021-12-03 ENCOUNTER — IMMUNIZATION (OUTPATIENT)
Dept: NURSING | Facility: CLINIC | Age: 46
End: 2021-12-03
Payer: COMMERCIAL

## 2021-12-03 PROCEDURE — 0004A PR COVID VAC PFIZER DIL RECON 30 MCG/0.3 ML IM: CPT

## 2021-12-03 PROCEDURE — 91300 PR COVID VAC PFIZER DIL RECON 30 MCG/0.3 ML IM: CPT

## 2022-02-19 ENCOUNTER — HEALTH MAINTENANCE LETTER (OUTPATIENT)
Age: 47
End: 2022-02-19

## 2022-03-01 PROBLEM — I77.74 VERTEBRAL ARTERY DISSECTION (H): Chronic | Status: ACTIVE | Noted: 2018-09-03

## 2022-03-01 PROBLEM — F41.9 ANXIETY: Chronic | Status: ACTIVE | Noted: 2021-07-08

## 2022-03-02 ENCOUNTER — VIRTUAL VISIT (OUTPATIENT)
Dept: SLEEP MEDICINE | Facility: CLINIC | Age: 47
End: 2022-03-02
Payer: COMMERCIAL

## 2022-03-02 VITALS — BODY MASS INDEX: 32.47 KG/M2 | WEIGHT: 245 LBS | HEIGHT: 73 IN

## 2022-03-02 DIAGNOSIS — G47.33 OSA (OBSTRUCTIVE SLEEP APNEA): Primary | ICD-10-CM

## 2022-03-02 PROCEDURE — 99203 OFFICE O/P NEW LOW 30 MIN: CPT | Mod: GT | Performed by: PHYSICIAN ASSISTANT

## 2022-03-02 ASSESSMENT — SLEEP AND FATIGUE QUESTIONNAIRES
HOW LIKELY ARE YOU TO NOD OFF OR FALL ASLEEP WHILE SITTING AND READING: WOULD NEVER DOZE
HOW LIKELY ARE YOU TO NOD OFF OR FALL ASLEEP WHILE SITTING QUIETLY AFTER LUNCH WITHOUT ALCOHOL: WOULD NEVER DOZE
HOW LIKELY ARE YOU TO NOD OFF OR FALL ASLEEP WHILE LYING DOWN TO REST IN THE AFTERNOON WHEN CIRCUMSTANCES PERMIT: WOULD NEVER DOZE
HOW LIKELY ARE YOU TO NOD OFF OR FALL ASLEEP WHILE SITTING INACTIVE IN A PUBLIC PLACE: WOULD NEVER DOZE
HOW LIKELY ARE YOU TO NOD OFF OR FALL ASLEEP WHILE WATCHING TV: SLIGHT CHANCE OF DOZING
HOW LIKELY ARE YOU TO NOD OFF OR FALL ASLEEP WHEN YOU ARE A PASSENGER IN A CAR FOR AN HOUR WITHOUT A BREAK: WOULD NEVER DOZE
HOW LIKELY ARE YOU TO NOD OFF OR FALL ASLEEP WHILE SITTING AND TALKING TO SOMEONE: WOULD NEVER DOZE
HOW LIKELY ARE YOU TO NOD OFF OR FALL ASLEEP IN A CAR, WHILE STOPPED FOR A FEW MINUTES IN TRAFFIC: WOULD NEVER DOZE

## 2022-03-02 NOTE — PROGRESS NOTES
Vahe is a 46 year old who is being evaluated via a billable video visit.      How would you like to obtain your AVS? MyChart  If the video visit is dropped, the invitation should be resent by: Send to e-mail at: [email or web address]  Will anyone else be joining your video visit? No      Video Start Time: 11:00 AM  Video-Visit Details    Type of service:  Video Visit    Video End Time:11:15 AM    Originating Location (pt. Location): Home    Distant Location (provider location):  [facility]     Platform used for Video Visit: [facility]       Outpatient Sleep Medicine Consultation:  March 2, 2022    Name: Vahe Haas MRN# [id number]   Age: 46 year old YOB: 1975     Date of Consultation: March 2, 2022  Consultation is requested by: No referring provider defined for this encounter. No ref. provider found  Primary care provider: Neida Benz       Reason for Sleep Consult:     Patient s Reason for visit  Vahe Haas main reason for visit: Re-establish care for previously diagnosed obstructive sleep apnea.   Vahe Haas's goals for this visit: rx for cpap           Assessment and Plan:     Summary Sleep Diagnoses & Recommendations:  Severe obstructive sleep apnea-  Excellent CPAP compliance and AHI is well controlled on CPAP 7-12 cm/H20, but CPAP 95% pressure is 11.7 cm/H20  Will change pressures to auto-CPAP 7-14 cm/H20.  He needs a new CPAP.  Comprehensive DME order for a replacement CPAP placed.    Orders Placed This Encounter   Procedures     Comprehensive DME       Summary Counseling:    Old Polysomnogram reviewed with the patient  Obstructive Sleep Apnea Reviewed  Complications of untreated Sleep Apnea Reviewed    Follow up 2 months after obtaining the new CPAP if compliance review is required. If not follow up in 2 years.    Total time spent reviewing medical records, history and physical examination, review of previous testing and interpretation  as well as documentation on this date: 36 minutes         History of Present Illness:     Past Sleep Evaluations:     Vahe Haas is a 46 year old male who was initially seen at the [facility] for twitching and snoring that is bothering his wife for years. He was referred for concerns of insomnia. His medical history is significant for vertebral artery dissection, depression and allergic rhinitis. He has a history of seizures in his early 20's.     PSG on 5/10/2016 (230#) showed: AHI was 41.5/hr, with desaturations down to 87%. He spent 1.6 minutes below 90% SpO2 and 0.6 minutes below 89%. RDI 41.5/hr.  REM RDI N/A.  Supine RDI N/A.  Periodic Limb Movement Index 63/hour, 6.5/hr were associated with arousals. The PLM indices were essentially unchanged on CPAP. His arousal index was 71.7/hr on the baseline (42/hr were spontaneous). On CPAP, his arousal index was 29/hr, 20/hr were spontaneous.    Last Sleep Medicine follow up was with Bennett Goltz, PA-C in 2018.    He needs a new CPAP. His current CPAP is about 6 years old and worn. The start button is broken. He is needing to unplug to start.     Overall, the patient rates his experience with PAP as 8-9 (0 poor, 10 great). The mask is comfortable. The mask is not leaking.  He is snoring some with the mask on. He is not having gasp arousals. He is not having any oral or nasal dryness. The pressure settings are comfortable.     His PAP interface is Nasal Mask.    He does feel rested in the morning.    Total score - Point: 1 (3/2/2022 10:44 AM)      APOORVA Total Score: 7    ResMed   Auto-PAP 7.0 - 12.0 cmH2O 30 day usage data:    96% of days with > 4 hours of use. 1/30 days with no use.   Average use 457 minutes per day.   95%ile Leak 23.16 L/min.   CPAP 95% pressure 11.7 cm.   AHI 0.39 events per hour.     SLEEP-WAKE SCHEDULE:     Work/School Days: Patient goes to school/work: Yes   Usually gets into bed at 11pm  Takes patient about 30 minutes to  fall asleep  Has trouble falling asleep 1-2 nights per week  Wakes up in the middle of the night 0 times per night due to Other  He has trouble falling back asleep 0   times a week and it usually takes 15 min?  to get back to sleep  Patient is usually up at 7:30  Uses alarm: Yes    Weekends/Non-work Days/All Other Days:  Usually gets into bed at 11pm   Takes patient about 30 min to fall asleep  Patient is usually up at 9am  Uses alarm: No    Sleep Need  Patient gets  7-8 hours sleep on average.    Patient thinks he needs about 9 hours sleep    Vahe Haas prefers to sleep in this position(s): Side   Patient states they do the following activities in bed: Watch TV    Naps  Patient takes a purposeful nap 0 times a week   He feels better after a nap:    He dozes off unintentionally  0  days per week  Patient has had a driving accident or near-miss due to sleepiness/drowsiness: No      SLEEP DISRUPTIONS:    Breathing/Snoring  Patient snores:Yes  Other people complain about his snoring: Yes  Patient has been told he stops breathing in his sleep: Yes  He has issues with the following: Morning headaches    Movement:  Patient gets pain, discomfort, with an urge to move: No  It happens when He is resting:    It happens more at night:   Patient has been told He kicks His legs at night:Yes     Behaviors in Sleep:  Vahe Haas has experienced the following behaviors while sleeping: Sleep-talking  He has experienced sudden muscle weakness during the day: No      Is there anything else you would like your sleep provider to know:        CAFFEINE AND OTHER SUBSTANCES:    Number of caffeinated beverages(coffee, tea, soda, energy drinks) that patient consumes  per day: 2  Last caffeine use is usually: 2pm  List of any prescribed or over the counter stimulants that patient takes:    List of any prescribed or over the counter sleep medication patient takes:    List of previous sleep medications that patient has tried:    Patient  drinks alcohol to help them sleep: No  Patient drinks alcohol near bedtime: No    Family History:  Patient has a family member been diagnosed with a sleep disorder: No            SCALES:    EPWORTH SLEEPINESS SCALE      [place] Sleepiness Scale ( TERESA Casarez  1990-1997[facility]/English - Final version - 21 Nov 07 - [facility] [place].) 3/2/2022   Sitting and reading Would never doze   Watching TV Slight chance of dozing   Sitting, inactive in a public place (e.g. a theatre or a meeting) Would never doze   As a passenger in a car for an hour without a break Would never doze   Lying down to rest in the afternoon when circumstances permit Would never doze   Sitting and talking to someone Would never doze   Sitting quietly after a lunch without alcohol Would never doze   In a car, while stopped for a few minutes in traffic Would never doze   [place] Score (MC) 0   [place] Score (Sleep) 1         INSOMNIA SEVERITY INDEX (APOORVA)      Insomnia Severity Index (APOORVA) 3/2/2022   Difficulty falling asleep 2   Difficulty staying asleep 0   Problems waking up too early 1   How SATISFIED/DISSATISFIED are you with your CURRENT sleep pattern? 1   How NOTICEABLE to others do you think your sleep problem is in terms of impairing the quality of your life? 1   How WORRIED/DISTRESSED are you about your current sleep problem? 1   To what extent do you consider your sleep problem to INTERFERE with your daily functioning (e.g. daytime fatigue, mood, ability to function at work/daily chores, concentration, memory, mood, etc.) CURRENTLY? 1   APOORVA Total Score 7       Guidelines for Scoring/Interpretation:    Total score categories:  0-7 = No clinically significant insomnia   8-14 = Subthreshold insomnia   15-21 = Clinical insomnia (moderate severity)  22-28 = Clinical insomnia (severe)    Used via courtesy of www.Acceptdealth.va.gov with permission from Asim Decker PhD., [facility]      STOP BANG     STOP BANG Questionnaire (  2008,  "the American Society of Anesthesiologists, Inc. [employer] 3/2/2022   Neck Cir (cm) Clinic: -   B/P Clinic: -   BMI Clinic: 32.77         GAD7    TERRA-7  10/13/2021   1. Feeling nervous, anxious, or on edge 0   2. Not being able to stop or control worrying 0   3. Worrying too much about different things 0   4. Trouble relaxing 0   5. Being so restless that it is hard to sit still 0   6. Becoming easily annoyed or irritable 1   7. Feeling afraid, as if something awful might happen 0   TERRA-7 Total Score 1   If you checked any problems, how difficult have they made it for you to do your work, take care of things at home, or get along with other people? -         CAGE-AID    No flowsheet data found.    CAGE-AID reprinted with permission from the [place] [employer], LEE Linda. and NORMA Ward, \"Conjoint screening questionnaires for alcohol and drug abuse\" [place] Medical Journal 94: 135-140, 1995.      PATIENT HEALTH QUESTIONNAIRE-9 (PHQ - 9)    PHQ-9 ([employer]) 10/13/2021   No Interest In Doing Things -   Feeling Depressed -   Trouble Sleeping -   Tired / No Energy -   No appetite or Over-Eating -   Feeling Bad about Self -   Trouble Concentrating -   Moving Slow or Restless -   Suicidal Thoughts -   Total Score -   1.  Little interest or pleasure in doing things 0   2.  Feeling down, depressed, or hopeless 0   3.  Trouble falling or staying asleep, or sleeping too much 0   4.  Feeling tired or having little energy 1   5.  Poor appetite or overeating 1   6.  Feeling bad about yourself 0   7.  Trouble concentrating 1   8.  Moving slowly or restless 0   9.  Suicidal or self-harm thoughts 0   PHQ-9 Total Score 3   Difficulty at work, home, or with people -   1.  Little interest or pleasure in doing things Not at all   2.  Feeling down, depressed, or hopeless Not at all   3.  Trouble falling or staying asleep, or sleeping too much Not at all   4.  Feeling tired or having little energy Several " days   5.  Poor appetite or overeating Several days   6.  Feeling bad about yourself Not at all   7.  Trouble concentrating Several days   8.  Moving slowly or restless Not at all   9.  Suicidal or self-harm thoughts Not at all   PHQ-9 via [facility] TOTAL SCORE-----> 3 (Minimal depression)   Difficulty at work, home, or with people Not difficult at all       Developed by Clyde Santana, Elvia Torres, Sahil Dave and colleagues, with an educational dc from [employer]. No permission required to reproduce, translate, display or distribute.        Allergies:    Allergies   Allergen Reactions     Seasonal Allergies        Medications:    Current Outpatient Medications   Medication Sig Dispense Refill     ALLERGY SHOTS        DULoxetine (CYMBALTA) 60 MG capsule Take 1 capsule (60 mg) by mouth daily 90 capsule 1       Problem List:  Patient Active Problem List    Diagnosis Date Noted     Vertebral artery dissection (H) 09/03/2018     Priority: High     Vertebral Artery Dissection, with subsequent Acute CVA- Lt PCA and RT PICA in 9/18. Supportive cares, no surgery or anti-thrombotics initially.  Started on asa 325mg.    No need for echo/lipids as not atherogenic cause, though possibly from violent cough a month earlier.    OT/Speech therapy completed, but sent back to OT in 7/19 for persistent right UE fine motor movements and multitasking issues (also with milder but persistent dysarthria, did not send back to speech therapy).    F/u with neurology in 2/19, rec prn f/u thereafter.  Rec cont asa 325mg/day for a year, then can stop (did not think significant benefit or harm from being on asa).    Avoid strenuous activity, chiropractic neck adjustments.       Anxiety 07/08/2021     Priority: Medium     Irritability 07/08/2021     Priority: Medium     Elevated BP without diagnosis of hypertension 07/08/2021     Priority: Medium     Right inguinal hernia 07/08/2021     Priority: Medium     see genral surgeon        History of seizure 02/13/2019     Priority: Medium     Grand mal seizure after accident & concussion ~age 21, last seizure around age ~32 (~3 days of no sleep, dizzy, fell down stairs, unsure if seizure was before or after the fall, never conclusively determined).       Acute bilateral thoracic back pain 11/08/2016     Priority: Medium     Acute bilateral low back pain without sciatica 11/08/2016     Priority: Medium     JANA (obstructive sleep apnea) 05/24/2016     Priority: Medium     PSG on 5/10/2016 (230#) showed: AHI was 41.5/hr, with desaturations down to 87%. He spent 1.6 minutes below 90% SpO2 and 0.6 minutes below 89%. RDI 41.5/hr.  REM RDI N/A.  Supine RDI N/A.  Periodic Limb Movement Index 63/hour, 6.5/hr were associated with arousals. The PLM indices were essentially unchanged on CPAP. His arousal index was 71.7/hr on the baseline (42/hr were spontaneous). On CPAP, his arousal index was 29/hr, 20/hr were spontaneous.       Drug-induced erectile dysfunction 11/13/2015     Priority: Medium     Viagra not helpful.  Cialis more helpful (tried once), but expensive.       MDD (major depressive disorder), recurrent episode, moderate (H) 02/24/2011     Priority: Medium     Effexor XR 75mg/d - started in 8/20.    Sexual se's on zoloft.  Minimal improvement in sx's on wellbutrin alone (and increased claustrophobia).      1st dx with MDD in '09.  Mother has MDD.  Started on citalopram in '09, but felt dulled, off, libido issues.  Switched to wellbutrin in 12/10 with improvement in mood, less se's.  Does have h/o sz's (in his 20s after concussion, last one at age 32 before or after fall down stairs)- none on wellbutrin.  Switched from wellbutrin to zoloft 100mg in 6/13.  Sexual se's, so added back low-dose wellbutrin (50mg/d- 7/13).  He is aware of the increased seizure risk with wellbutrin.  '15- zoloft 100mg/d.  Wellbutrin XL 150mg/d in 10/15.  Thinks it's helpful- feels more level.         CARDIOVASCULAR  SCREENING; LDL GOAL LESS THAN 160 10/31/2010     Priority: Medium     Insomnia 05/22/2007     Priority: Medium     Improved with exercise.         Allergic rhinitis      Priority: Medium     Allergic rhinitis.  Has been doing allergy shots since '12, helping.   Problem list name updated by automated process. Provider to review          Past Medical/Surgical History:  Past Medical History:   Diagnosis Date     Allergic rhinitis, cause unspecified     Allergic rhinitis     Cerebral infarction (H) Sept 2019     Depressive disorder 2010     Insomnia     hard time getting to sleep     Moderate major depression (H) 2/24/2011     Other convulsions 1999    grand mal seizure after accident & concusion, also had until age 4, last sz around age 21     Past Surgical History:   Procedure Laterality Date     DAVINCI HERNIORRHAPHY INGUINAL Bilateral 8/18/2021    Procedure: ROBOT-ASSISTED BILATERAL INGUINAL HERNIA REPAIR;  Surgeon: Dwayne Tilley MD;  Location:  OR      TOOTH EXTRACTION W/FORCEP  2003     HERNIA REPAIR  Aug 18, 2021       Social History:  Social History     Socioeconomic History     Marital status:      Spouse name: Not on file     Number of children: 0     Years of education: BA     Highest education level: Not on file   Occupational History     Occupation:       Employer: SELF     Occupation: IT     Employer: [employer]     Comment: [employer]   Tobacco Use     Smoking status: Never Smoker     Smokeless tobacco: Never Used   Vaping Use     Vaping Use: Never used   Substance and Sexual Activity     Alcohol use: Yes     Alcohol/week: 0.0 standard drinks     Comment: social, 0-2 times per week     Drug use: No     Sexual activity: Yes     Partners: Female     Birth control/protection: Pill   Other Topics Concern     Parent/sibling w/ CABG, MI or angioplasty before 65F 55M? No   Social History Narrative    Social Documentation:        Balanced Diet: Yes     Calcium  intake: 1- 2 per day    Caffeine: 1 cup per day    Exercise:  type of activity bike to work and walk dogs;  6 times per week    Sunscreen: No    Seatbelts:  Yes    Self Breast Exam:  No - n/a    Self Testicular Exam: Yes    Physical/Emotional/Sexual Abuse: No     Do you feel safe in your environment? Yes        Cholesterol screen up to date: 2007    Eye Exam up to date: yes    Dental Exam up to date: Yes    Pap smear up to date: Does Not Apply    Mammogram up to date: Does Not Apply    Dexa Scan up to date: Does Not Apply    Colonoscopy up to date: Does Not Apply    Immunizations up to date: Yes-Td 2002    Glucose screen if over 40:  2007         Melissa Webber [facility]                      Social Determinants of Health     Financial Resource Strain: Not on file   Food Insecurity: Not on file   Transportation Needs: Not on file   Physical Activity: Not on file   Stress: Not on file   Social Connections: Not on file   Intimate Partner Violence: Not on file   Housing Stability: Not on file       Family History:  Family History   Problem Relation Age of Onset     Hypertension Mother      Allergies Mother      Depression Mother      Lipids Mother      Musculoskeletal Disorder Mother      Osteoporosis Mother      Respiratory Mother         asthma     Allergies Father      Lipids Father      Respiratory Father         asthma     Breast Cancer Paternal Grandmother      Hypertension Maternal Grandmother      Cerebrovascular Disease Maternal Grandmother      Allergies Maternal Grandmother      Arthritis Maternal Grandmother      Lipids Maternal Grandmother      Osteoporosis Maternal Grandmother      Hypertension Paternal Grandfather      Depression Paternal Grandfather      Gastrointestinal Disease Paternal Grandfather         ulcers     Lipids Paternal Grandfather      Allergies Sister      Respiratory Sister         asthma     Thyroid Disease Sister        Review of Systems:  A complete review of systems reviewed by me is  "negative with the exeption of what has been mentioned in the history of present illness.  In the last TWO WEEKS have you experienced any of the following symptoms?  Fevers: No  Night Sweats: No  Weight Gain: No  Pain at Night: No  Double Vision: No  Changes in Vision: No  Difficulty Breathing through Nose: No  Sore Throat in Morning: No  Dry Mouth in the Morning: No  Shortness of Breath Lying Flat: No  Shortness of Breath With Activity: No  Awakening with Shortness of Breath: No  Increased Cough: No  Heart Racing at Night: No  Swelling in Feet or Legs: No  Diarrhea at Night: No  Heartburn at Night: No  Urinating More than Once at Night: No  Losing Control of Urine at Night: No  Joint Pains at Night: No  Headaches in Morning: No  Weakness in Arms or Legs: No  Depressed Mood: No  Anxiety: No     Physical Examination:  Vitals: Ht 1.842 m (6' 0.5\")   Wt 111.1 kg (245 lb)   BMI 32.77 kg/m    BMI= Body mass index is 32.77 kg/m .           GENERAL APPEARANCE: alert and no distress  EYES: Eyes grossly normal to inspection  NECK: no asymmetry, masses, or scars  RESP: breathing is non-labored  NEURO: mentation intact and speech normal  PSYCH: affect normal/bright           Data: All pertinent previous laboratory data reviewed     Last Comprehensive Metabolic Panel:  Sodium   Date Value Ref Range Status   11/18/2020 139 133 - 144 mmol/L Final     Potassium   Date Value Ref Range Status   11/18/2020 3.7 3.4 - 5.3 mmol/L Final     Chloride   Date Value Ref Range Status   11/18/2020 109 94 - 109 mmol/L Final     Carbon Dioxide   Date Value Ref Range Status   11/18/2020 23 20 - 32 mmol/L Final     Anion Gap   Date Value Ref Range Status   11/18/2020 7 3 - 14 mmol/L Final     Glucose   Date Value Ref Range Status   11/18/2020 113 (H) 70 - 99 mg/dL Final     Comment:     Fasting specimen     Urea Nitrogen   Date Value Ref Range Status   11/18/2020 20 7 - 30 mg/dL Final     Creatinine   Date Value Ref Range Status   11/18/2020 1.02 " 0.66 - 1.25 mg/dL Final     GFR Estimate   Date Value Ref Range Status   11/18/2020 89 >60 mL/min/[1.73_m2] Final     Comment:     Non  GFR Calc  Starting 12/18/2018, serum creatinine based estimated GFR (eGFR) will be   calculated using the Chronic Kidney Disease Epidemiology Collaboration   (CKD-EPI) equation.       Calcium   Date Value Ref Range Status   11/18/2020 8.6 8.5 - 10.1 mg/dL Final       Recent Labs   Lab Test 11/18/20  0818 07/16/19  1020    141   POTASSIUM 3.7 4.1   CHLORIDE 109 111*   CO2 23 25   ANIONGAP 7 5   * 98   BUN 20 16   CR 1.02 1.20   KHADIJAH 8.6 8.7       Recent Labs   Lab Test 11/18/20  0818   PROTTOTAL 7.0   ALBUMIN 3.9   BILITOTAL 0.4   ALKPHOS 90   AST 23   ALT 50           JULIETTE Jorge 3/2/2022

## 2022-03-03 NOTE — NURSING NOTE
CPAP compliance instruction letter sent via [employer]. DME order automatically route to [facility].      MIRELLA Beard  [facility] [place]

## 2022-05-11 ENCOUNTER — VIRTUAL VISIT (OUTPATIENT)
Dept: FAMILY MEDICINE | Facility: CLINIC | Age: 47
End: 2022-05-11
Payer: COMMERCIAL

## 2022-05-11 DIAGNOSIS — U07.1 INFECTION DUE TO 2019 NOVEL CORONAVIRUS: Primary | ICD-10-CM

## 2022-05-11 DIAGNOSIS — I77.74 VERTEBRAL ARTERY DISSECTION (H): ICD-10-CM

## 2022-05-11 DIAGNOSIS — G47.33 OSA (OBSTRUCTIVE SLEEP APNEA): ICD-10-CM

## 2022-05-11 PROCEDURE — 99213 OFFICE O/P EST LOW 20 MIN: CPT | Mod: GT | Performed by: FAMILY MEDICINE

## 2022-05-11 ASSESSMENT — PATIENT HEALTH QUESTIONNAIRE - PHQ9: SUM OF ALL RESPONSES TO PHQ QUESTIONS 1-9: 0

## 2022-05-11 NOTE — PROGRESS NOTES
Vahe is a 46 year old who is being evaluated via a billable video visit.      How would you like to obtain your AVS? Mail a copy  If the video visit is dropped, the invitation should be resent by: Text to cell phone: [phone]  Will anyone else be joining your video visit? No      Video Start Time: 1:23 PM      ICD-10-CM    1. Infection due to 2019 novel coronavirus  U07.1 nirmatrelvir and ritonavir (PAXLOVID) therapy pack   2. Vertebral artery dissection (H)  I77.74    3. JANA (obstructive sleep apnea)  G47.33          Infection secondary to COVID-19.  Mild symptoms.    Some risk because of his of his history of CVA with a vertebral artery dissection and sleep apnea    I discussed options for treatment, supportive.    Also discussed Paxlovid and patient was interested in being treated.    We discussed risk benefits side effects.    He is not on any medication that has any interaction of concern.    His renal function has been normal.        Patient will be on twice daily for 5 days.    He understands to follow-up with his primary physician if any worsening of symptoms.    Also discussed the recommendations for isolated injury before returning to work etc.        Subjective   Vahe is a 46 year old who presents for the following health issues COVID-19 infection  HPI     This patient was exposed last Friday to COVID-19.    He started symptoms on 5/9/2022 and had a positive home test for COVID-19 on 5/10/2022    Past medical history significant for sleep apnea he uses CPAP    Also had a vertebral artery dissection (on the right), back in 2018.  He had a subsequent CVA secondary to that.    He had follow-up evaluation in the right vertebral artery was recanalized.  He had no sequelae.  He was told to stay on an aspirin for I believe it was 1 to 2 years he is now off of that.  Has had no issues.  No clotting history or problems.    Patient's symptoms on 5/9/2022 started out with head congestion scratchy throat  postnasal drip headache no real fever he did have some achiness in his back.  He has had some cough.  Some fatigue.    No shortness of breath no nausea vomiting or diarrhea no skin rash no leg swelling or tenderness.    He is only on duloxetine and allergy shots.    Review of Systems   10 point review of systems positive as outlined above otherwise negative      Objective           Vitals:  No vitals were obtained today due to virtual visit.    Physical Exam   General appearance no acute distress    Patient denies any shortness of breath there is nonlabored breathing    He denies any fever.    HEENT with nasal congestion he states that is clear    Denies any earache    Has had a scratchy throat.  There is no neck stiffness no swollen glands    Lungs: Nonlabored breathing, no wheezing    Heart: No palpitations or rapid heart rate    Abdomen nontender    Extremities without edema    Skin is normal no rashes.            Video-Visit Details    Type of service:  Video Visit    Video End Time: 1:42 PM    Originating Location (pt. Location): Home    Distant Location (provider location):  [facility]     Platform used for Video Visit: [employer]

## 2022-06-19 DIAGNOSIS — R45.4 IRRITABILITY: ICD-10-CM

## 2022-06-19 DIAGNOSIS — F33.1 MDD (MAJOR DEPRESSIVE DISORDER), RECURRENT EPISODE, MODERATE (H): ICD-10-CM

## 2022-06-20 NOTE — TELEPHONE ENCOUNTER
Routing refill request to provider for review/approval because:  Overdue for apt.  Added in pharm comments to schedule.  Please authorize if appropriate.  Thanks,  Perri Stevens RN      Return in about 6 months (around 4/13/2022) for Physical Exam, Depression follow-up.

## 2022-06-21 RX ORDER — DULOXETIN HYDROCHLORIDE 60 MG/1
CAPSULE, DELAYED RELEASE ORAL
Qty: 30 CAPSULE | Refills: 0 | Status: SHIPPED | OUTPATIENT
Start: 2022-06-21 | End: 2022-07-27

## 2022-06-21 NOTE — TELEPHONE ENCOUNTER
Rx sent in for #30.  Apt due reminder in pharmacy line, but pt could use a reminder via call/mychart.  Thanks- CW

## 2022-07-27 ENCOUNTER — MYC REFILL (OUTPATIENT)
Dept: FAMILY MEDICINE | Facility: CLINIC | Age: 47
End: 2022-07-27

## 2022-07-27 DIAGNOSIS — F33.1 MDD (MAJOR DEPRESSIVE DISORDER), RECURRENT EPISODE, MODERATE (H): ICD-10-CM

## 2022-07-27 DIAGNOSIS — R45.4 IRRITABILITY: ICD-10-CM

## 2022-07-28 RX ORDER — DULOXETIN HYDROCHLORIDE 60 MG/1
60 CAPSULE, DELAYED RELEASE ORAL DAILY
Qty: 30 CAPSULE | Refills: 0 | Status: SHIPPED | OUTPATIENT
Start: 2022-07-28 | End: 2022-08-16

## 2022-07-28 NOTE — TELEPHONE ENCOUNTER
Medication is being filled for 1 time refill only due to:  Patient needs to be seen because due for follow up,  Future VV 8/16.     Blanca Bourgeois RN

## 2022-08-16 ENCOUNTER — VIRTUAL VISIT (OUTPATIENT)
Dept: FAMILY MEDICINE | Facility: CLINIC | Age: 47
End: 2022-08-16
Payer: COMMERCIAL

## 2022-08-16 DIAGNOSIS — R73.09 ELEVATED GLUCOSE: ICD-10-CM

## 2022-08-16 DIAGNOSIS — R45.4 IRRITABILITY: ICD-10-CM

## 2022-08-16 DIAGNOSIS — I77.74 VERTEBRAL ARTERY DISSECTION (H): ICD-10-CM

## 2022-08-16 DIAGNOSIS — F33.1 MDD (MAJOR DEPRESSIVE DISORDER), RECURRENT EPISODE, MODERATE (H): Primary | Chronic | ICD-10-CM

## 2022-08-16 DIAGNOSIS — Z13.6 CARDIOVASCULAR SCREENING; LDL GOAL LESS THAN 160: ICD-10-CM

## 2022-08-16 PROCEDURE — 99214 OFFICE O/P EST MOD 30 MIN: CPT | Mod: GT | Performed by: FAMILY MEDICINE

## 2022-08-16 RX ORDER — DULOXETIN HYDROCHLORIDE 60 MG/1
60 CAPSULE, DELAYED RELEASE ORAL DAILY
Qty: 90 CAPSULE | Refills: 0 | Status: SHIPPED | OUTPATIENT
Start: 2022-08-16 | End: 2022-11-18

## 2022-08-16 ASSESSMENT — ANXIETY QUESTIONNAIRES
IF YOU CHECKED OFF ANY PROBLEMS ON THIS QUESTIONNAIRE, HOW DIFFICULT HAVE THESE PROBLEMS MADE IT FOR YOU TO DO YOUR WORK, TAKE CARE OF THINGS AT HOME, OR GET ALONG WITH OTHER PEOPLE: NOT DIFFICULT AT ALL
7. FEELING AFRAID AS IF SOMETHING AWFUL MIGHT HAPPEN: SEVERAL DAYS
8. IF YOU CHECKED OFF ANY PROBLEMS, HOW DIFFICULT HAVE THESE MADE IT FOR YOU TO DO YOUR WORK, TAKE CARE OF THINGS AT HOME, OR GET ALONG WITH OTHER PEOPLE?: NOT DIFFICULT AT ALL
3. WORRYING TOO MUCH ABOUT DIFFERENT THINGS: NOT AT ALL
GAD7 TOTAL SCORE: 2
7. FEELING AFRAID AS IF SOMETHING AWFUL MIGHT HAPPEN: SEVERAL DAYS
6. BECOMING EASILY ANNOYED OR IRRITABLE: SEVERAL DAYS
2. NOT BEING ABLE TO STOP OR CONTROL WORRYING: NOT AT ALL
GAD7 TOTAL SCORE: 2
GAD7 TOTAL SCORE: 2
5. BEING SO RESTLESS THAT IT IS HARD TO SIT STILL: NOT AT ALL
4. TROUBLE RELAXING: NOT AT ALL
1. FEELING NERVOUS, ANXIOUS, OR ON EDGE: NOT AT ALL

## 2022-08-16 ASSESSMENT — PATIENT HEALTH QUESTIONNAIRE - PHQ9
SUM OF ALL RESPONSES TO PHQ QUESTIONS 1-9: 4
10. IF YOU CHECKED OFF ANY PROBLEMS, HOW DIFFICULT HAVE THESE PROBLEMS MADE IT FOR YOU TO DO YOUR WORK, TAKE CARE OF THINGS AT HOME, OR GET ALONG WITH OTHER PEOPLE: NOT DIFFICULT AT ALL
SUM OF ALL RESPONSES TO PHQ QUESTIONS 1-9: 4

## 2022-08-16 NOTE — PROGRESS NOTES
Vahe is a 47 year old who is being evaluated via a billable video visit.      How would you like to obtain your AVS? MyChart  If the video visit is dropped, the invitation should be resent by: Text to cell phone: -  Will anyone else be joining your video visit? No      Assessment & Plan       ICD-10-CM    1. MDD (major depressive disorder), recurrent episode, moderate (H)  F33.1 DULoxetine (CYMBALTA) 60 MG capsule   2. Irritability  R45.4 DULoxetine (CYMBALTA) 60 MG capsule   3. Vertebral artery dissection (H)  I77.74 Lipid panel reflex to direct LDL Fasting     Comprehensive metabolic panel (BMP + Alb, Alk Phos, ALT, AST, Total. Bili, TP)   4. Elevated glucose  R73.09 Hemoglobin A1c     Comprehensive metabolic panel (BMP + Alb, Alk Phos, ALT, AST, Total. Bili, TP)   5. CARDIOVASCULAR SCREENING; LDL GOAL LESS THAN 160  Z13.6 Lipid panel reflex to direct LDL Fasting     Comprehensive metabolic panel (BMP + Alb, Alk Phos, ALT, AST, Total. Bili, TP)      MDD/irritability-   On cymbalta 60mg/d, and feels like it's still the best fit in terms of efficacy (wife mostly notices his sx's, and thinks they are 'okay'), and mild sexual se's.  Will continue.  Rec cont q6mo follow-up, but will do 3mo follow-up this time due to need for physical and fasting labs prior.  Then can go back to 6mo virtual visit.     H/o Vertebral artery dissection in 9/18 and elevated glucose-   Doing well s/p dissection, only with mild word-finding difficulties now.  Work is going well.  Did note elevated glucose at last set of labs in 11/20.  Will recheck lipids, glucose and A1C prior to next appt.    Return in about 3 months (around 11/16/2022) for Physical Exam, Depression follow-up, Fasting labs prior.     Neida Benz MD  [facility]            Subjective   Vahe is a 47 year old presenting for the following health issues:  Depression      History of Present Illness       Mental Health Follow-up:  Patient presents to  follow-up on Depression.Patient's depression since last visit has been:  Better  The patient is having other symptoms associated with depression.      Any significant life events: No  Patient is not feeling anxious or having panic attacks.  Patient has no concerns about alcohol or drug use.    He eats 0-1 servings of fruits and vegetables daily.He consumes 0 sweetened beverage(s) daily.He exercises with enough effort to increase his heart rate 10 to 19 minutes per day.  He exercises with enough effort to increase his heart rate 4 days per week.   He is taking medications regularly.    Today's PHQ-9         PHQ-9 Total Score: 4    PHQ-9 Q9 Thoughts of better off dead/self-harm past 2 weeks :   Not at all    How difficult have these problems made it for you to do your work, take care of things at home, or get along with other people: Not difficult at all  Today's TERRA-7 Score: 2       Depression Followup    How are you doing with your depression since your last visit? Improved a little    Are you having other symptoms that might be associated with depression? No    Have you had a significant life event?  No     Are you feeling anxious or having panic attacks?   No    Do you have any concerns with your use of alcohol or other drugs? No    July was a bad month personally, but this month is better.  Death in wife's family- multiple.      Cymbalta 60mg/d.  Thinks it's working well.  Not 100%, but thinks it's the best of the med trials.  Wife usually notices the most.  She thinks he's a little 'cranky', but better than in the past.  Mild sexual se's, but not as bad as other meds.          Social History     Tobacco Use     Smoking status: Never Smoker     Smokeless tobacco: Never Used   Vaping Use     Vaping Use: Never used   Substance Use Topics     Alcohol use: Yes     Alcohol/week: 0.0 standard drinks     Comment: social, 0-2 times per week     Drug use: No     PHQ 10/13/2021 5/11/2022 8/16/2022   PHQ-9 Total Score 3 0 4    Q9: Thoughts of better off dead/self-harm past 2 weeks Not at all Not at all Not at all     TERRA-7 SCORE 8/3/2021 10/13/2021 8/16/2022   Total Score 3 (minimal anxiety) 1 (minimal anxiety) 2 (minimal anxiety)   Total Score 3 1 2         Review of Systems   Constitutional, HEENT, cardiovascular, pulmonary, gi and gu systems are negative, except as otherwise noted.        Objective     Vitals:  No vitals were obtained today due to virtual visit.    Physical Exam   GENERAL: Healthy, alert and no distress  EYES: Eyes grossly normal to inspection.  No discharge or erythema, or obvious scleral/conjunctival abnormalities.  RESP: No audible wheeze, cough, or visible cyanosis.  No visible retractions or increased work of breathing.    SKIN: Visible skin clear. No significant rash, abnormal pigmentation or lesions.  NEURO: Cranial nerves grossly intact.  Mentation and speech appropriate for age.  PSYCH: Mentation appears normal, affect normal/bright, judgement and insight intact, normal speech and appearance well-groomed.    Lab on 08/16/2021   Component Date Value Ref Range Status     SARS CoV2 PCR 08/16/2021 Negative  Negative Final    NEGATIVE: SARS-CoV-2 (COVID-19) RNA not detected, presumed negative.             Video-Visit Details    Video Start Time: 12:09 PM    Type of service:  Video Visit    Video End Time:12:21 PM    Originating Location (pt. Location): Home    Distant Location (provider location):  [facility]     Platform used for Video Visit: Joinnus    .  ..

## 2022-10-22 ENCOUNTER — HEALTH MAINTENANCE LETTER (OUTPATIENT)
Age: 47
End: 2022-10-22

## 2022-11-15 ENCOUNTER — LAB (OUTPATIENT)
Dept: LAB | Facility: CLINIC | Age: 47
End: 2022-11-15
Payer: COMMERCIAL

## 2022-11-15 DIAGNOSIS — Z11.59 NEED FOR HEPATITIS C SCREENING TEST: ICD-10-CM

## 2022-11-15 DIAGNOSIS — Z13.6 CARDIOVASCULAR SCREENING; LDL GOAL LESS THAN 160: ICD-10-CM

## 2022-11-15 DIAGNOSIS — R73.09 ELEVATED GLUCOSE: ICD-10-CM

## 2022-11-15 DIAGNOSIS — I77.74 VERTEBRAL ARTERY DISSECTION (H): ICD-10-CM

## 2022-11-15 LAB
ALBUMIN SERPL BCG-MCNC: 4.3 G/DL (ref 3.5–5.2)
ALP SERPL-CCNC: 84 U/L (ref 40–129)
ALT SERPL W P-5'-P-CCNC: 32 U/L (ref 10–50)
ANION GAP SERPL CALCULATED.3IONS-SCNC: 11 MMOL/L (ref 7–15)
AST SERPL W P-5'-P-CCNC: 27 U/L (ref 10–50)
BILIRUB SERPL-MCNC: 0.3 MG/DL
BUN SERPL-MCNC: 19.6 MG/DL (ref 6–20)
CALCIUM SERPL-MCNC: 9 MG/DL (ref 8.6–10)
CHLORIDE SERPL-SCNC: 108 MMOL/L (ref 98–107)
CHOLEST SERPL-MCNC: 196 MG/DL
CREAT SERPL-MCNC: 1.11 MG/DL (ref 0.67–1.17)
DEPRECATED HCO3 PLAS-SCNC: 23 MMOL/L (ref 22–29)
GFR SERPL CREATININE-BSD FRML MDRD: 83 ML/MIN/1.73M2
GLUCOSE SERPL-MCNC: 100 MG/DL (ref 70–99)
HBA1C MFR BLD: 5.7 % (ref 0–5.6)
HDLC SERPL-MCNC: 27 MG/DL
LDLC SERPL CALC-MCNC: 142 MG/DL
NONHDLC SERPL-MCNC: 169 MG/DL
POTASSIUM SERPL-SCNC: 3.9 MMOL/L (ref 3.4–5.3)
PROT SERPL-MCNC: 6.8 G/DL (ref 6.4–8.3)
SODIUM SERPL-SCNC: 142 MMOL/L (ref 136–145)
TRIGL SERPL-MCNC: 137 MG/DL

## 2022-11-15 PROCEDURE — 83036 HEMOGLOBIN GLYCOSYLATED A1C: CPT

## 2022-11-15 PROCEDURE — 36415 COLL VENOUS BLD VENIPUNCTURE: CPT

## 2022-11-15 PROCEDURE — 80061 LIPID PANEL: CPT

## 2022-11-15 PROCEDURE — 86803 HEPATITIS C AB TEST: CPT

## 2022-11-15 PROCEDURE — 80053 COMPREHEN METABOLIC PANEL: CPT

## 2022-11-18 ENCOUNTER — OFFICE VISIT (OUTPATIENT)
Dept: FAMILY MEDICINE | Facility: CLINIC | Age: 47
End: 2022-11-18
Payer: COMMERCIAL

## 2022-11-18 VITALS
DIASTOLIC BLOOD PRESSURE: 88 MMHG | BODY MASS INDEX: 34.33 KG/M2 | HEIGHT: 73 IN | WEIGHT: 259 LBS | OXYGEN SATURATION: 98 % | TEMPERATURE: 97.1 F | HEART RATE: 84 BPM | RESPIRATION RATE: 16 BRPM | SYSTOLIC BLOOD PRESSURE: 138 MMHG

## 2022-11-18 DIAGNOSIS — E78.5 HYPERLIPIDEMIA LDL GOAL <130: ICD-10-CM

## 2022-11-18 DIAGNOSIS — Z11.59 NEED FOR HEPATITIS C SCREENING TEST: ICD-10-CM

## 2022-11-18 DIAGNOSIS — Z00.00 ROUTINE GENERAL MEDICAL EXAMINATION AT A HEALTH CARE FACILITY: Primary | ICD-10-CM

## 2022-11-18 DIAGNOSIS — Z12.11 SCREEN FOR COLON CANCER: ICD-10-CM

## 2022-11-18 DIAGNOSIS — F33.1 MDD (MAJOR DEPRESSIVE DISORDER), RECURRENT EPISODE, MODERATE (H): Chronic | ICD-10-CM

## 2022-11-18 DIAGNOSIS — R45.4 IRRITABILITY: ICD-10-CM

## 2022-11-18 DIAGNOSIS — I77.74 VERTEBRAL ARTERY DISSECTION (H): Chronic | ICD-10-CM

## 2022-11-18 DIAGNOSIS — R03.0 ELEVATED BP WITHOUT DIAGNOSIS OF HYPERTENSION: ICD-10-CM

## 2022-11-18 LAB — HCV AB SERPL QL IA: NONREACTIVE

## 2022-11-18 PROCEDURE — 0124A COVID-19,PF,PFIZER BOOSTER BIVALENT: CPT | Performed by: FAMILY MEDICINE

## 2022-11-18 PROCEDURE — 90686 IIV4 VACC NO PRSV 0.5 ML IM: CPT | Performed by: FAMILY MEDICINE

## 2022-11-18 PROCEDURE — 91312 COVID-19,PF,PFIZER BOOSTER BIVALENT: CPT | Performed by: FAMILY MEDICINE

## 2022-11-18 PROCEDURE — 99396 PREV VISIT EST AGE 40-64: CPT | Mod: 25 | Performed by: FAMILY MEDICINE

## 2022-11-18 PROCEDURE — 99214 OFFICE O/P EST MOD 30 MIN: CPT | Mod: 25 | Performed by: FAMILY MEDICINE

## 2022-11-18 PROCEDURE — 96127 BRIEF EMOTIONAL/BEHAV ASSMT: CPT | Performed by: FAMILY MEDICINE

## 2022-11-18 PROCEDURE — 90471 IMMUNIZATION ADMIN: CPT | Performed by: FAMILY MEDICINE

## 2022-11-18 RX ORDER — DULOXETIN HYDROCHLORIDE 60 MG/1
60 CAPSULE, DELAYED RELEASE ORAL DAILY
Qty: 90 CAPSULE | Refills: 1 | Status: SHIPPED | OUTPATIENT
Start: 2022-11-18 | End: 2023-05-01 | Stop reason: ALTCHOICE

## 2022-11-18 ASSESSMENT — ENCOUNTER SYMPTOMS
WEAKNESS: 0
SORE THROAT: 0
NERVOUS/ANXIOUS: 0
ARTHRALGIAS: 0
MYALGIAS: 0
DIZZINESS: 0
SHORTNESS OF BREATH: 0
DYSURIA: 0
NAUSEA: 0
HEMATURIA: 0
PARESTHESIAS: 0
ABDOMINAL PAIN: 0
PALPITATIONS: 0
CHILLS: 0
CONSTIPATION: 0
JOINT SWELLING: 0
HEARTBURN: 0
FEVER: 0
FREQUENCY: 0
DIARRHEA: 0
HEADACHES: 0
HEMATOCHEZIA: 0
COUGH: 0
EYE PAIN: 0

## 2022-11-18 ASSESSMENT — ANXIETY QUESTIONNAIRES
1. FEELING NERVOUS, ANXIOUS, OR ON EDGE: SEVERAL DAYS
GAD7 TOTAL SCORE: 5
GAD7 TOTAL SCORE: 5
7. FEELING AFRAID AS IF SOMETHING AWFUL MIGHT HAPPEN: NOT AT ALL
3. WORRYING TOO MUCH ABOUT DIFFERENT THINGS: SEVERAL DAYS
2. NOT BEING ABLE TO STOP OR CONTROL WORRYING: NOT AT ALL
IF YOU CHECKED OFF ANY PROBLEMS ON THIS QUESTIONNAIRE, HOW DIFFICULT HAVE THESE PROBLEMS MADE IT FOR YOU TO DO YOUR WORK, TAKE CARE OF THINGS AT HOME, OR GET ALONG WITH OTHER PEOPLE: SOMEWHAT DIFFICULT
6. BECOMING EASILY ANNOYED OR IRRITABLE: SEVERAL DAYS
4. TROUBLE RELAXING: SEVERAL DAYS
GAD7 TOTAL SCORE: 5
7. FEELING AFRAID AS IF SOMETHING AWFUL MIGHT HAPPEN: NOT AT ALL
5. BEING SO RESTLESS THAT IT IS HARD TO SIT STILL: SEVERAL DAYS
8. IF YOU CHECKED OFF ANY PROBLEMS, HOW DIFFICULT HAVE THESE MADE IT FOR YOU TO DO YOUR WORK, TAKE CARE OF THINGS AT HOME, OR GET ALONG WITH OTHER PEOPLE?: SOMEWHAT DIFFICULT

## 2022-11-18 ASSESSMENT — PAIN SCALES - GENERAL: PAINLEVEL: NO PAIN (0)

## 2022-11-18 ASSESSMENT — PATIENT HEALTH QUESTIONNAIRE - PHQ9
SUM OF ALL RESPONSES TO PHQ QUESTIONS 1-9: 5
10. IF YOU CHECKED OFF ANY PROBLEMS, HOW DIFFICULT HAVE THESE PROBLEMS MADE IT FOR YOU TO DO YOUR WORK, TAKE CARE OF THINGS AT HOME, OR GET ALONG WITH OTHER PEOPLE: SOMEWHAT DIFFICULT
SUM OF ALL RESPONSES TO PHQ QUESTIONS 1-9: 5

## 2022-11-18 NOTE — PROGRESS NOTES
SUBJECTIVE:   CC: Vahe is an 47 year old who presents for preventative health visit.   Patient has been advised of split billing requirements and indicates understanding: Yes     Healthy Habits:     Getting at least 3 servings of Calcium per day:  Yes    Bi-annual eye exam:  Yes    Dental care twice a year:  Yes    Sleep apnea or symptoms of sleep apnea:  Sleep apnea    Diet:  Regular (no restrictions)    Frequency of exercise:  None    Taking medications regularly:  Yes    Medication side effects:  Other    PHQ-2 Total Score: 2    Additional concerns today:  Yes    - will do flu and covid vaccines today.  Will refer for colonoscopy.  Will add Hep C to labs done a few days ago.    Reviewed fasting labs  LDL up to 142.    Still not great on vegetables, but very good on fruits.  Eating more apples, bananas, clementines, grapes.  Buys blueberries, but keeps forgetting about them.  Not as many vegetables, but trying the cauliflower.  Standbuy tired meals- han's mac and cheese, but then do steamed broccoli.  Less take out, though not for health reasons, more for financial reasons (putting on an expensive addition to the house).    Wt is up, not exercising as much.  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  Not much lately- did it last about a month ago.  That's the big issue.  Trying to get back to it.    Has been sleeping better.  Unsure why.  If he exercises, tends to sleep better, but now sleeping well without the exercise.          Depression and Anxiety Follow-Up    How are you doing with your depression since your last visit? Improved slightly    How are you doing with your anxiety since your last visit?  Improved slightly    Are you having other symptoms that might be associated with depression or anxiety? Yes:  angry, sexual side effects    Have you had a significant life event? No     Do you have any concerns with your use of alcohol or other drugs? No    PHQ/TERRA both 5.    Mood/irritability- feels  like it's good, but wife thinks he's still 'cranky'.  Better than baseline, but not much.    Would be interested in 'trying to start over', quantify the effect.  Maybe after holidays, though.  Has missed dose/s, not horrible wd sx's.        Social History     Tobacco Use     Smoking status: Never     Smokeless tobacco: Never   Vaping Use     Vaping Use: Never used   Substance Use Topics     Alcohol use: Yes     Alcohol/week: 0.0 standard drinks     Comment: social, 0-2 times per week     Drug use: No     PHQ 5/11/2022 8/16/2022 11/18/2022   PHQ-9 Total Score 0 4 5   Q9: Thoughts of better off dead/self-harm past 2 weeks Not at all Not at all Not at all     TERRA-7 SCORE 10/13/2021 8/16/2022 11/18/2022   Total Score 1 (minimal anxiety) 2 (minimal anxiety) 5 (mild anxiety)   Total Score 1 2 5     Last PHQ-9 11/18/2022   1.  Little interest or pleasure in doing things 1   2.  Feeling down, depressed, or hopeless 0   3.  Trouble falling or staying asleep, or sleeping too much 0   4.  Feeling tired or having little energy 1   5.  Poor appetite or overeating 2   6.  Feeling bad about yourself 0   7.  Trouble concentrating 1   8.  Moving slowly or restless 0   Q9: Thoughts of better off dead/self-harm past 2 weeks 0   PHQ-9 Total Score 5   Difficulty at work, home, or with people -     TERRA-7  11/18/2022   1. Feeling nervous, anxious, or on edge 1   2. Not being able to stop or control worrying 0   3. Worrying too much about different things 1   4. Trouble relaxing 1   5. Being so restless that it is hard to sit still 1   6. Becoming easily annoyed or irritable 1   7. Feeling afraid, as if something awful might happen 0   TERRA-7 Total Score 5   If you checked any problems, how difficult have they made it for you to do your work, take care of things at home, or get along with other people? Somewhat difficult       Suicide Assessment Five-step Evaluation and Treatment (SAFE-T)      Today's PHQ-2 Score:   PHQ-2 ( 1999 Pfizer)  11/18/2022   Q1: Little interest or pleasure in doing things 1   Q2: Feeling down, depressed or hopeless 1   PHQ-2 Score 2   PHQ-2 Total Score (12-17 Years)- Positive if 3 or more points; Administer PHQ-A if positive -   Q1: Little interest or pleasure in doing things Several days   Q2: Feeling down, depressed or hopeless Several days   PHQ-2 Score 2           Social History     Tobacco Use     Smoking status: Never     Smokeless tobacco: Never   Substance Use Topics     Alcohol use: Yes     Alcohol/week: 0.0 standard drinks     Comment: social, 0-2 times per week     If you drink alcohol do you typically have >3 drinks per day or >7 drinks per week? No    Alcohol Use 11/18/2022   Prescreen: >3 drinks/day or >7 drinks/week? No   Prescreen: >3 drinks/day or >7 drinks/week? -   No flowsheet data found.    Last PSA: No results found for: PSA    Reviewed orders with patient. Reviewed health maintenance and updated orders accordingly - Yes      Lab work is in process  Labs reviewed in EPIC  BP Readings from Last 3 Encounters:   11/18/22 138/88   08/18/21 134/77   08/12/21 130/84    Wt Readings from Last 3 Encounters:   11/18/22 117.5 kg (259 lb)   03/02/22 111.1 kg (245 lb)   08/18/21 115.1 kg (253 lb 11.2 oz)                  Patient Active Problem List   Diagnosis     Allergic rhinitis     Insomnia     Hyperlipidemia LDL goal <130     MDD (major depressive disorder), recurrent episode, moderate (H)     Drug-induced erectile dysfunction     JANA (obstructive sleep apnea)     Acute bilateral thoracic back pain     Acute bilateral low back pain without sciatica     Vertebral artery dissection (H)     History of seizure     Anxiety     Irritability     Elevated BP without diagnosis of hypertension     Right inguinal hernia     Past Surgical History:   Procedure Laterality Date     DAVINCI HERNIORRHAPHY INGUINAL Bilateral 8/18/2021    Procedure: ROBOT-ASSISTED BILATERAL INGUINAL HERNIA REPAIR;  Surgeon: Dwayne Tilley,  MD;  Location: [facility] OR     HC TOOTH EXTRACTION W/FORCEP  2003     HERNIA REPAIR  Aug 18, 2021       Social History     Tobacco Use     Smoking status: Never     Smokeless tobacco: Never   Substance Use Topics     Alcohol use: Yes     Alcohol/week: 0.0 standard drinks     Comment: social, 0-2 times per week     Family History   Problem Relation Age of Onset     Hypertension Mother      Allergies Mother      Depression Mother      Lipids Mother      Musculoskeletal Disorder Mother      Osteoporosis Mother      Respiratory Mother         asthma     Allergies Father      Lipids Father      Respiratory Father         asthma     Breast Cancer Paternal Grandmother      Hypertension Maternal Grandmother      Cerebrovascular Disease Maternal Grandmother      Allergies Maternal Grandmother      Arthritis Maternal Grandmother      Lipids Maternal Grandmother      Osteoporosis Maternal Grandmother      Hypertension Paternal Grandfather      Depression Paternal Grandfather      Gastrointestinal Disease Paternal Grandfather         ulcers     Lipids Paternal Grandfather      Allergies Sister      Respiratory Sister         asthma     Thyroid Disease Sister          Current Outpatient Medications   Medication Sig Dispense Refill     ALLERGY SHOTS        DULoxetine (CYMBALTA) 60 MG capsule Take 1 capsule (60 mg) by mouth daily 90 capsule 1     Allergies   Allergen Reactions     Seasonal Allergies      Recent Labs   Lab Test 11/15/22  0911 11/18/20  0818 07/16/19  1020 09/04/18  1040 09/03/18  0934   A1C 5.7*  --   --   --  5.6   * 122* 124*   < >  --    HDL 27* 32* 26*   < >  --    TRIG 137 123 168*   < >  --    ALT 32 50  --   --  29   CR 1.11 1.02 1.20   < > 1.04   GFRESTIMATED 83 89 74   < > 78   GFRESTBLACK  --  >90 85   < > >90   POTASSIUM 3.9 3.7 4.1   < > 3.5    < > = values in this interval not displayed.        Reviewed and updated as needed this visit by clinical staff                  Reviewed and updated as needed  this visit by Provider                     Review of Systems   Constitutional: Negative for chills and fever.   HENT: Negative for congestion, ear pain, hearing loss and sore throat.    Eyes: Negative for pain and visual disturbance.   Respiratory: Negative for cough and shortness of breath.    Cardiovascular: Negative for chest pain, palpitations and peripheral edema.   Gastrointestinal: Negative for abdominal pain, constipation, diarrhea, heartburn, hematochezia and nausea.   Genitourinary: Positive for impotence. Negative for dysuria, frequency, genital sores, hematuria, penile discharge and urgency.   Musculoskeletal: Negative for arthralgias, joint swelling and myalgias.   Skin: Negative for rash.   Neurological: Negative for dizziness, weakness, headaches and paresthesias.   Psychiatric/Behavioral: Negative for mood changes. The patient is not nervous/anxious.          OBJECTIVE:   There were no vitals taken for this visit.    Physical Exam  GENERAL: healthy, alert and no distress  EYES: Eyes grossly normal to inspection, PERRL and conjunctivae and sclerae normal  HENT: ear canals and TM's normal, nose and mouth without ulcers or lesions  NECK: no adenopathy, no asymmetry, masses, or scars and thyroid normal to palpation  RESP: lungs clear to auscultation - no rales, rhonchi or wheezes  CV: regular rate and rhythm, normal S1 S2, no S3 or S4, no murmur, click or rub, no peripheral edema and peripheral pulses strong  ABDOMEN: soft, nontender, no hepatosplenomegaly, no masses and bowel sounds normal  MS: no gross musculoskeletal defects noted, no edema  SKIN: no suspicious lesions or rashes  NEURO: Normal strength and tone, mentation intact and speech normal  PSYCH: mentation appears normal, affect normal/bright    Diagnostic Test Results:  Labs reviewed in Epic      ASSESSMENT/PLAN:       ICD-10-CM    1. Routine general medical examination at a health care facility  Z00.00 CANCELED: REVIEW OF HEALTH MAINTENANCE  PROTOCOL ORDERS     CANCELED: Lipid panel reflex to direct LDL Fasting      2. MDD (major depressive disorder), recurrent episode, moderate (H)  F33.1 DULoxetine (CYMBALTA) 60 MG capsule      3. Irritability  R45.4 DULoxetine (CYMBALTA) 60 MG capsule      4. Hyperlipidemia LDL goal <130  E78.5       5. Vertebral artery dissection (H)  I77.74       6. Elevated BP without diagnosis of hypertension  R03.0       7. Need for hepatitis C screening test  Z11.59 Hepatitis C Screen Reflex to HCV RNA Quant and Genotype      8. Screen for colon cancer  Z12.11 Colonoscopy Screening  Referral        CPE- Reviewed PMH and medications/supplements. Discussed healthy habits, eye/dental care, healthcare maintenance issues, including cancer screenings (colonoscopies, PSA), relevant immunizations, and cardiac risk factor screenings such as for cholesterol, HTN, and DM.  Flu vaccine and COVID immunizations needed today.  See orders for tests and screening needed.    HM- will refer for colonoscopy.  Flu and COVID vaccines - risks/benefits discussed, given today.   Hep C - will add to labs done a few days ago.    MDD/Irritability/Anger-  Pt still with minimal insight, sx's mostly noticed by wife.  She thinks sx's are likely bit better than when off meds, but not much.  Pt notes mild sexual se's on the cymbalta.  Hasn't noticed w/d sx's if missed doses (max 1-2 days).  Pt interested in trial of 'starting over' to quantify effects, but, after the holidays.  --Will send in 6 month refills of the 60mg/d continued plan.  --If interested in starting wean down/off, msg for 20mg tabs.  Wean plan- 40mg/d x 1 wk,  20mg/d x 1 wk, 20mg every other day for one week, then off.  --Will give PHQ/TERRA to take home and copy for wife to do monthly if he tries to wean/down off.    --Check in with virtual visit 2 months after being off the cymbalta with PHQ/TERRA scores, and his and his wife's perception of symptoms on/off med.      Hyperlipidemia/BP  "elevaton-   LDL increased to 140s this time.  Pt thinks diet is actually better, less take out and more fruits.  Still needs work on increasing vegetables.  Main difference is he stopped using their peloton.  Trying to find ways to restart.  Will try and check BPs and rtc if SBP>140.    H/o vertebral artery dissection- not CAD related.  Stable residual sx's.        Patient has been advised of split billing requirements and indicates understanding: Yes      COUNSELING:   Reviewed preventive health counseling, as reflected in patient instructions      BMI:   Estimated body mass index is 32.77 kg/m  as calculated from the following:    Height as of 3/2/22: 1.842 m (6' 0.5\").    Weight as of 3/2/22: 111.1 kg (245 lb).   Weight management plan: Discussed healthy diet and exercise guidelines      He reports that he has never smoked. He has never used smokeless tobacco.        Neida Benz MD  [facility]  "

## 2022-11-18 NOTE — NURSING NOTE
Prior to immunization administration, verified patients identity using patient s name and date of birth. Please see Immunization Activity for additional information.     Screening Questionnaire for Adult Immunization    Are you sick today?   No   Do you have allergies to medications, food, a vaccine component or latex?   No   Have you ever had a serious reaction after receiving a vaccination?   No   Do you have a long-term health problem with heart, lung, kidney, or metabolic disease (e.g., diabetes), asthma, a blood disorder, no spleen, complement component deficiency, a cochlear implant, or a spinal fluid leak?  Are you on long-term aspirin therapy?   No   Do you have cancer, leukemia, HIV/AIDS, or any other immune system problem?   No   Do you have a parent, brother, or sister with an immune system problem?   No   In the past 3 months, have you taken medications that affect  your immune system, such as prednisone, other steroids, or anticancer drugs; drugs for the treatment of rheumatoid arthritis, Crohn s disease, or psoriasis; or have you had radiation treatments?   No   Have you had a seizure, or a brain or other nervous system problem?   No   During the past year, have you received a transfusion of blood or blood    products, or been given immune (gamma) globulin or antiviral drug?   No   For women: Are you pregnant or is there a chance you could become       pregnant during the next month?   No   Have you received any vaccinations in the past 4 weeks?   No     Immunization questionnaire answers were all negative.        Per orders of Dr. Ferguson, injection of flu and pfizer booster given by Isamar Chou. Patient instructed to remain in clinic for 15 minutes afterwards, and to report any adverse reaction to me immediately.       Screening performed by Isamar Chou on 11/18/2022 at 11:12 AM.

## 2023-04-14 ENCOUNTER — VIRTUAL VISIT (OUTPATIENT)
Dept: FAMILY MEDICINE | Facility: CLINIC | Age: 48
End: 2023-04-14
Payer: COMMERCIAL

## 2023-04-14 DIAGNOSIS — F33.1 MDD (MAJOR DEPRESSIVE DISORDER), RECURRENT EPISODE, MODERATE (H): Chronic | ICD-10-CM

## 2023-04-14 DIAGNOSIS — F63.81 INTERMITTENT EXPLOSIVE DISORDER: Primary | ICD-10-CM

## 2023-04-14 PROCEDURE — 99214 OFFICE O/P EST MOD 30 MIN: CPT | Mod: VID | Performed by: FAMILY MEDICINE

## 2023-04-14 RX ORDER — DULOXETIN HYDROCHLORIDE 30 MG/1
30 CAPSULE, DELAYED RELEASE ORAL DAILY
Qty: 30 CAPSULE | Refills: 0 | Status: SHIPPED | OUTPATIENT
Start: 2023-04-14 | End: 2023-09-22 | Stop reason: ALTCHOICE

## 2023-04-14 ASSESSMENT — ANXIETY QUESTIONNAIRES
GAD7 TOTAL SCORE: 8
7. FEELING AFRAID AS IF SOMETHING AWFUL MIGHT HAPPEN: SEVERAL DAYS
GAD7 TOTAL SCORE: 8
1. FEELING NERVOUS, ANXIOUS, OR ON EDGE: SEVERAL DAYS
7. FEELING AFRAID AS IF SOMETHING AWFUL MIGHT HAPPEN: SEVERAL DAYS
8. IF YOU CHECKED OFF ANY PROBLEMS, HOW DIFFICULT HAVE THESE MADE IT FOR YOU TO DO YOUR WORK, TAKE CARE OF THINGS AT HOME, OR GET ALONG WITH OTHER PEOPLE?: SOMEWHAT DIFFICULT
IF YOU CHECKED OFF ANY PROBLEMS ON THIS QUESTIONNAIRE, HOW DIFFICULT HAVE THESE PROBLEMS MADE IT FOR YOU TO DO YOUR WORK, TAKE CARE OF THINGS AT HOME, OR GET ALONG WITH OTHER PEOPLE: SOMEWHAT DIFFICULT
3. WORRYING TOO MUCH ABOUT DIFFERENT THINGS: SEVERAL DAYS
2. NOT BEING ABLE TO STOP OR CONTROL WORRYING: NOT AT ALL
4. TROUBLE RELAXING: SEVERAL DAYS
GAD7 TOTAL SCORE: 8
6. BECOMING EASILY ANNOYED OR IRRITABLE: MORE THAN HALF THE DAYS
5. BEING SO RESTLESS THAT IT IS HARD TO SIT STILL: MORE THAN HALF THE DAYS

## 2023-04-14 ASSESSMENT — PATIENT HEALTH QUESTIONNAIRE - PHQ9
SUM OF ALL RESPONSES TO PHQ QUESTIONS 1-9: 9
SUM OF ALL RESPONSES TO PHQ QUESTIONS 1-9: 9
10. IF YOU CHECKED OFF ANY PROBLEMS, HOW DIFFICULT HAVE THESE PROBLEMS MADE IT FOR YOU TO DO YOUR WORK, TAKE CARE OF THINGS AT HOME, OR GET ALONG WITH OTHER PEOPLE: NOT DIFFICULT AT ALL
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
## 2023-08-13 DIAGNOSIS — F63.81 INTERMITTENT EXPLOSIVE DISORDER: ICD-10-CM

## 2023-08-13 DIAGNOSIS — F33.1 MDD (MAJOR DEPRESSIVE DISORDER), RECURRENT EPISODE, MODERATE (H): Chronic | ICD-10-CM

## 2023-08-14 NOTE — TELEPHONE ENCOUNTER
Prescription approved per [facility] Refill Protocol.  1 month refill only; patient due for appointment (follow-up)  Violeta RUBALCAVA RN

## 2023-09-22 ENCOUNTER — VIRTUAL VISIT (OUTPATIENT)
Dept: FAMILY MEDICINE | Facility: CLINIC | Age: 48
End: 2023-09-22
Payer: COMMERCIAL

## 2023-09-22 DIAGNOSIS — Z12.11 COLON CANCER SCREENING: ICD-10-CM

## 2023-09-22 DIAGNOSIS — R45.4 IRRITABILITY: ICD-10-CM

## 2023-09-22 DIAGNOSIS — F63.81 INTERMITTENT EXPLOSIVE DISORDER: ICD-10-CM

## 2023-09-22 DIAGNOSIS — F33.1 MDD (MAJOR DEPRESSIVE DISORDER), RECURRENT EPISODE, MODERATE (H): Primary | Chronic | ICD-10-CM

## 2023-09-22 PROCEDURE — 99213 OFFICE O/P EST LOW 20 MIN: CPT | Mod: VID | Performed by: FAMILY MEDICINE

## 2023-09-22 ASSESSMENT — ANXIETY QUESTIONNAIRES
2. NOT BEING ABLE TO STOP OR CONTROL WORRYING: NOT AT ALL
3. WORRYING TOO MUCH ABOUT DIFFERENT THINGS: NOT AT ALL
IF YOU CHECKED OFF ANY PROBLEMS ON THIS QUESTIONNAIRE, HOW DIFFICULT HAVE THESE PROBLEMS MADE IT FOR YOU TO DO YOUR WORK, TAKE CARE OF THINGS AT HOME, OR GET ALONG WITH OTHER PEOPLE: NOT DIFFICULT AT ALL
7. FEELING AFRAID AS IF SOMETHING AWFUL MIGHT HAPPEN: NOT AT ALL
5. BEING SO RESTLESS THAT IT IS HARD TO SIT STILL: NOT AT ALL
GAD7 TOTAL SCORE: 2
6. BECOMING EASILY ANNOYED OR IRRITABLE: NOT AT ALL
GAD7 TOTAL SCORE: 2
1. FEELING NERVOUS, ANXIOUS, OR ON EDGE: SEVERAL DAYS

## 2023-09-22 ASSESSMENT — PATIENT HEALTH QUESTIONNAIRE - PHQ9
SUM OF ALL RESPONSES TO PHQ QUESTIONS 1-9: 3
SUM OF ALL RESPONSES TO PHQ QUESTIONS 1-9: 3
10. IF YOU CHECKED OFF ANY PROBLEMS, HOW DIFFICULT HAVE THESE PROBLEMS MADE IT FOR YOU TO DO YOUR WORK, TAKE CARE OF THINGS AT HOME, OR GET ALONG WITH OTHER PEOPLE: NOT DIFFICULT AT ALL
5. POOR APPETITE OR OVEREATING: SEVERAL DAYS

## 2023-09-22 NOTE — PROGRESS NOTES
Vahe is a 48 year old who is being evaluated via a billable video visit.      How would you like to obtain your AVS? MyChart  If the video visit is dropped, the invitation should be resent by: Text to cell phone: [phone]  Will anyone else be joining your video visit? No          Assessment & Plan     ICD-10-CM    1. MDD (major depressive disorder), recurrent episode, moderate (H)  F33.1 FLUoxetine (PROZAC) 20 MG capsule      2. Intermittent explosive disorder  F63.81 FLUoxetine (PROZAC) 20 MG capsule      3. Irritability  R45.4       4. Colon cancer screening  Z12.11 Colonoscopy Screening  Referral         MDD/Irritability/explosive disorder-  Mood/anxiety/anger symptoms under good control with fluoxetine 20mg/d- seems to be working much better than the multiple other medication trials, with minimal side effects (waking up earlier, but getting to sleep faster, so same 8 hrs of sleep).  Can still get angry, but slower, not explosive, and manageable.  Wife agrees.  Had sexual se's on other meds, but not on this one. Will continue.  Refills sent.  Continue every six month follow-up- next appt as physical.    Colon cancer screening- new referral sent to hopefully help pt schedule easier- msg if still an issue.     Follow-up Visit   Expected date:  Mar 22, 2024 (Approximate)      Follow Up Appointment Details:     Follow-up with whom?: Me    Follow-Up for what?: Adult Preventive    Any Additional Chronic Condition Management?:  Anxiety  Depression       How?: In Person                        Neida Benz MD  [facility]          Subjective   Vahe is a 48 year old, presenting for the following health issues:  Recheck Medication        History of Present Illness       Reason for visit:  Medication review    He eats 2-3 servings of fruits and vegetables daily.He consumes 0 sweetened beverage(s) daily.He exercises with enough effort to increase his heart rate 9 or less minutes per  day.  He exercises with enough effort to increase his heart rate 3 or less days per week.   He is taking medications regularly.       Medication Followup of FLUoxetine (PROZAC) 20 MG capsule   Taking Medication as prescribed: yes  Side Effects:  None  Medication Helping Symptoms:  yes      At last appt, weaned down/off cymbalta and went on fluoxetine.  Lots better, very minimal sexual se's.  He doesn't have spikes of anger  He'll get angry about things, but now slower and appropriate.  More manageable.  The only mild negative is that he seems to be waking up 'early', but is also tends to fall asleep faster/easier.  Setting alarm for 9 hrs, but very quickly falls asleep.  Getting the same 8 hrs of sleep, just waking up an hour early instead of taking an hour to fall asleep.    Wife has said that things seem significantly better.    Sexual se's are significantly less.    Depression and Anxiety Follow-Up  How are you doing with your depression since your last visit? Improved   How are you doing with your anxiety since your last visit?  Improved   Are you having other symptoms that might be associated with depression or anxiety? No  Have you had a significant life event? No   Do you have any concerns with your use of alcohol or other drugs? No    Social History     Tobacco Use    Smoking status: Never    Smokeless tobacco: Never   Vaping Use    Vaping Use: Never used   Substance Use Topics    Alcohol use: Yes     Alcohol/week: 0.0 standard drinks of alcohol     Comment: social, 0-2 times per week    Drug use: No         11/18/2022     9:24 AM 4/14/2023    11:52 AM 9/22/2023     1:34 PM   PHQ   PHQ-9 Total Score 5 9 3   Q9: Thoughts of better off dead/self-harm past 2 weeks Not at all Not at all Not at all         11/18/2022     9:25 AM 4/14/2023    11:53 AM 9/22/2023     2:16 PM   TERRA-7 SCORE   Total Score 5 (mild anxiety) 8 (mild anxiety)    Total Score 5 8 2       Review of Systems   Constitutional, HEENT,  cardiovascular, pulmonary, gi and gu systems are negative, except as otherwise noted.        Objective     Vitals:  No vitals were obtained today due to virtual visit.    Physical Exam   GENERAL: Healthy, alert and no distress  EYES: Eyes grossly normal to inspection.  No discharge or erythema, or obvious scleral/conjunctival abnormalities.  RESP: No audible wheeze, cough, or visible cyanosis.  No visible retractions or increased work of breathing.    SKIN: Visible skin clear. No significant rash, abnormal pigmentation or lesions.  NEURO: Cranial nerves grossly intact.  Mentation and speech appropriate for age.  PSYCH: Mentation appears normal, affect normal/bright, judgement and insight intact, normal speech and appearance well-groomed.            Video-Visit Details    Type of service:  Video Visit     Originating Location (pt. Location): Home    Distant Location (provider location):  On-site  Platform used for Video Visit: Rahul

## 2023-09-26 ENCOUNTER — TRANSFERRED RECORDS (OUTPATIENT)
Dept: HEALTH INFORMATION MANAGEMENT | Facility: CLINIC | Age: 48
End: 2023-09-26
Payer: COMMERCIAL

## 2023-10-17 ENCOUNTER — TELEPHONE (OUTPATIENT)
Dept: GASTROENTEROLOGY | Facility: CLINIC | Age: 48
End: 2023-10-17
Payer: COMMERCIAL

## 2023-10-17 NOTE — TELEPHONE ENCOUNTER
"Endoscopy Scheduling Screen    Have you had a positive Covid test in the last 14 days?  No    Are you active on MyChart?   Yes    What insurance is in the chart?  Other:      Ordering/Referring Provider:     CELESTINA BAUTISTA      (If ordering provider performs procedure, schedule with ordering provider unless otherwise instructed. )    BMI: Estimated body mass index is 34.64 kg/m  as calculated from the following:    Height as of 11/18/22: 1.842 m (6' 0.5\").    Weight as of 11/18/22: 117.5 kg (259 lb).     Sedation Ordered  moderate sedation.   If patient BMI > 50 do not schedule in ASC.    If patient BMI > 45 do not schedule at [facility].    Are you taking methadone or Suboxone?  No    Are you taking any prescription medications for pain 3 or more times per week?   No    Do you have a history of malignant hyperthermia or adverse reaction to anesthesia?  No    (Females) Are you currently pregnant?        Have you been diagnosed or told you have pulmonary hypertension?   No    Do you have an LVAD?  No    Have you been told you have moderate to severe sleep apnea?  Yes (RN Review required for scheduling unless scheduling in Hospital.)    Have you been told you have COPD, asthma, or any other lung disease?  No    Do you have any heart conditions?  No     Have you ever had an organ transplant?   No    Have you ever had or are you awaiting a heart or lung transplant?   No    Have you had a stroke or transient ischemic attack (TIA aka \"mini stroke\" in the last 6 months?   No    Have you been diagnosed with or been told you have cirrhosis of the liver?   No    Are you currently on dialysis?   No    Do you need assistance transferring?   No    BMI: Estimated body mass index is 34.64 kg/m  as calculated from the following:    Height as of 11/18/22: 1.842 m (6' 0.5\").    Weight as of 11/18/22: 117.5 kg (259 lb).     Is patients BMI > 40 and scheduling location [facility]?  No    Do you take an injectable medication for weight " loss or diabetes (excluding insulin)?  No    Do you take the medication Naltrexone?  No    Do you take blood thinners?  No       Prep   Are you currently on dialysis or do you have chronic kidney disease?  No    Do you have a diagnosis of diabetes?  No    Do you have a diagnosis of cystic fibrosis (CF)?  No    On a regular basis do you go 3 -5 days between bowel movements?  No    BMI > 40?  No    Preferred Pharmacy:    [id number] - [place], [place] - [address] [facility]  [address] [facility] [address]  Phone: [phone] Fax: [phone]      Final Scheduling Details   Colonoscopy prep sent?  Standard MiraLAX    Procedure scheduled  Colonoscopy    Surgeon:       Date of procedure:  2/26     Pre-OP / PAC:   No - Not required for this site.    Location  [facility] - Per order.    Sedation   Moderate Sedation - Per order.      Patient Reminders:   You will receive a call from a Nurse to review instructions and health history.  This assessment must be completed prior to your procedure.  Failure to complete the Nurse assessment may result in the procedure being cancelled.      On the day of your procedure, please designate an adult(s) who can drive you home stay with you for the next 24 hours. The medicines used in the exam will make you sleepy. You will not be able to drive.      You cannot take public transportation, ride share services, or non-medical taxi service without a responsible caregiver.  Medical transport services are allowed with the requirement that a responsible caregiver will receive you at your destination.  We require that drivers and caregivers are confirmed prior to your procedure.

## 2023-10-26 ENCOUNTER — IMMUNIZATION (OUTPATIENT)
Dept: NURSING | Facility: CLINIC | Age: 48
End: 2023-10-26
Payer: COMMERCIAL

## 2023-10-26 PROCEDURE — 90686 IIV4 VACC NO PRSV 0.5 ML IM: CPT

## 2023-10-26 PROCEDURE — 90480 ADMN SARSCOV2 VAC 1/ONLY CMP: CPT

## 2023-10-26 PROCEDURE — 90471 IMMUNIZATION ADMIN: CPT

## 2023-10-26 PROCEDURE — 91320 SARSCV2 VAC 30MCG TRS-SUC IM: CPT

## 2023-11-01 NOTE — PROGRESS NOTES
Clinic Care Coordination Contact  Care Team Conversations    Clinic Care Coordinator RN received a return call for Oscar BANGURA care coordinator at Neurology.  According to Oscar, Neurology scheduling left voicemails for patient's wife on December 27th and January 4th. They have not received a return call.   Clinic Care Coordinator RN left a voicemail today.   If no return call Clinic Care Coordinator RN will attempt another contact.     Patient is scheduled to see PCP 1/16/19    scheduling phone number for neuro is [phone]   Diabetes   E11.9  High cholesterol   E78.00  Asthma   J45.909  Vitamin D deficiency   E55.9

## 2024-01-14 ENCOUNTER — HEALTH MAINTENANCE LETTER (OUTPATIENT)
Age: 49
End: 2024-01-14

## 2024-02-10 ENCOUNTER — TELEPHONE (OUTPATIENT)
Dept: GASTROENTEROLOGY | Facility: CLINIC | Age: 49
End: 2024-02-10

## 2024-02-10 NOTE — TELEPHONE ENCOUNTER
Pre visit planning completed.      Procedure details:    Patient scheduled for Colonoscopy  on 2.26.2024.     Arrival time: 0900. Procedure time 0945    Pre op exam needed? N/A    Facility location: [facility]; [address] [place] [address] [place] [address]    Sedation type: Conscious sedation     Indication for procedure: screening colonoscopy      Chart review:     Electronic implanted devices? No    Recent diagnosis of diverticulitis within the last 6 weeks? No    Diabetic? No    Diabetic medication HOLDING recommendations: (if applicable)  Oral diabetic medications: N/A  Diabetic injectables: N/A  Insulin: N/A      Medication review:    Anticoagulants? No    NSAIDS? No NSAID medications per patient's medication list.  RN will verify with pre-assessment call.    Other medication HOLDING recommendations:  N/A      Prep for procedure:     Bowel prep recommendation: Standard Miralax   Due to:  standard bowel prep.    Prep instructions sent via juan j Flores RN  Endoscopy Procedure Pre Assessment RN  [phone] option 4

## 2024-02-13 NOTE — TELEPHONE ENCOUNTER
Attempted to contact patient in order to complete pre assessment questions.     No answer. Left message to return call to [phone] option 4    Missed call communication sent via [employer].      Kat Scott RN  Endoscopy Procedure Pre Assessment RN

## 2024-02-15 ENCOUNTER — TELEPHONE (OUTPATIENT)
Dept: GASTROENTEROLOGY | Facility: CLINIC | Age: 49
End: 2024-02-15
Payer: COMMERCIAL

## 2024-02-15 NOTE — TELEPHONE ENCOUNTER
Caller: Vahe  Reason for Reschedule/Cancellation (please be detailed, any staff messages or encounters to note?): wife has surgery      Prior to reschedule please review:  Ordering Provider: CELESTINA BAUTISTA   Sedation Determined: CS  Does patient have any ASC Exclusions, please identify?: JANA      Notes on Cancelled Procedure:  Procedure: Lower Endoscopy [Colonoscopy]   Date: 2/26  Location: [facility]; [address] [place] [address]  Surgeon:       Rescheduled: Yes  Procedure: Lower Endoscopy [Colonoscopy]   Date: 5/3  Location: [facility]; [address] [place] [address]  Surgeon: Maxwell  Sedation Level Scheduled  CS,  Reason for Sedation Level order  Prep/Instructions updated and sent: y       Send In - basket message to Panc - Clark Pool if [facility]  procedure is canceled or rescheduled: [ N/A, YES or NO] n/a

## 2024-02-15 NOTE — TELEPHONE ENCOUNTER
Second call attempt to complete pre assessment.     No answer.  Left message to return call to [phone] #4 by next business day prior to 4PM or procedure will be sent to cancel.     Additional information needed?  Missed call communication sent via juan j Ludwig RN  Endoscopy Procedure Pre Assessment RN

## 2024-04-04 SDOH — HEALTH STABILITY: PHYSICAL HEALTH: ON AVERAGE, HOW MANY DAYS PER WEEK DO YOU ENGAGE IN MODERATE TO STRENUOUS EXERCISE (LIKE A BRISK WALK)?: 0 DAYS

## 2024-04-04 SDOH — HEALTH STABILITY: PHYSICAL HEALTH: ON AVERAGE, HOW MANY MINUTES DO YOU ENGAGE IN EXERCISE AT THIS LEVEL?: 0 MIN

## 2024-04-04 ASSESSMENT — ANXIETY QUESTIONNAIRES
GAD7 TOTAL SCORE: 3
4. TROUBLE RELAXING: NOT AT ALL
1. FEELING NERVOUS, ANXIOUS, OR ON EDGE: NOT AT ALL
IF YOU CHECKED OFF ANY PROBLEMS ON THIS QUESTIONNAIRE, HOW DIFFICULT HAVE THESE PROBLEMS MADE IT FOR YOU TO DO YOUR WORK, TAKE CARE OF THINGS AT HOME, OR GET ALONG WITH OTHER PEOPLE: NOT DIFFICULT AT ALL
7. FEELING AFRAID AS IF SOMETHING AWFUL MIGHT HAPPEN: NOT AT ALL
5. BEING SO RESTLESS THAT IT IS HARD TO SIT STILL: SEVERAL DAYS
GAD7 TOTAL SCORE: 3
7. FEELING AFRAID AS IF SOMETHING AWFUL MIGHT HAPPEN: NOT AT ALL
2. NOT BEING ABLE TO STOP OR CONTROL WORRYING: NOT AT ALL
6. BECOMING EASILY ANNOYED OR IRRITABLE: SEVERAL DAYS
GAD7 TOTAL SCORE: 3
3. WORRYING TOO MUCH ABOUT DIFFERENT THINGS: SEVERAL DAYS
8. IF YOU CHECKED OFF ANY PROBLEMS, HOW DIFFICULT HAVE THESE MADE IT FOR YOU TO DO YOUR WORK, TAKE CARE OF THINGS AT HOME, OR GET ALONG WITH OTHER PEOPLE?: NOT DIFFICULT AT ALL

## 2024-04-04 ASSESSMENT — PATIENT HEALTH QUESTIONNAIRE - PHQ9
10. IF YOU CHECKED OFF ANY PROBLEMS, HOW DIFFICULT HAVE THESE PROBLEMS MADE IT FOR YOU TO DO YOUR WORK, TAKE CARE OF THINGS AT HOME, OR GET ALONG WITH OTHER PEOPLE: NOT DIFFICULT AT ALL
SUM OF ALL RESPONSES TO PHQ QUESTIONS 1-9: 3
SUM OF ALL RESPONSES TO PHQ QUESTIONS 1-9: 3

## 2024-04-04 ASSESSMENT — SOCIAL DETERMINANTS OF HEALTH (SDOH): HOW OFTEN DO YOU GET TOGETHER WITH FRIENDS OR RELATIVES?: ONCE A WEEK

## 2024-04-05 ENCOUNTER — OFFICE VISIT (OUTPATIENT)
Dept: FAMILY MEDICINE | Facility: CLINIC | Age: 49
End: 2024-04-05
Attending: FAMILY MEDICINE
Payer: COMMERCIAL

## 2024-04-05 VITALS
HEART RATE: 59 BPM | OXYGEN SATURATION: 96 % | WEIGHT: 248.8 LBS | DIASTOLIC BLOOD PRESSURE: 88 MMHG | BODY MASS INDEX: 32.97 KG/M2 | HEIGHT: 73 IN | TEMPERATURE: 97.1 F | SYSTOLIC BLOOD PRESSURE: 128 MMHG | RESPIRATION RATE: 16 BRPM

## 2024-04-05 DIAGNOSIS — E78.5 HYPERLIPIDEMIA LDL GOAL <130: ICD-10-CM

## 2024-04-05 DIAGNOSIS — Z00.00 ROUTINE GENERAL MEDICAL EXAMINATION AT A HEALTH CARE FACILITY: Primary | ICD-10-CM

## 2024-04-05 DIAGNOSIS — R45.4 IRRITABILITY: ICD-10-CM

## 2024-04-05 DIAGNOSIS — M77.12 LEFT LATERAL EPICONDYLITIS: ICD-10-CM

## 2024-04-05 DIAGNOSIS — F63.81 INTERMITTENT EXPLOSIVE DISORDER: ICD-10-CM

## 2024-04-05 DIAGNOSIS — G47.33 OSA (OBSTRUCTIVE SLEEP APNEA): Chronic | ICD-10-CM

## 2024-04-05 DIAGNOSIS — F33.1 MDD (MAJOR DEPRESSIVE DISORDER), RECURRENT EPISODE, MODERATE (H): Chronic | ICD-10-CM

## 2024-04-05 DIAGNOSIS — Z12.11 SCREEN FOR COLON CANCER: ICD-10-CM

## 2024-04-05 DIAGNOSIS — I77.74 VERTEBRAL ARTERY DISSECTION (H): Chronic | ICD-10-CM

## 2024-04-05 DIAGNOSIS — R73.09 ELEVATED GLUCOSE: ICD-10-CM

## 2024-04-05 LAB
ALBUMIN SERPL BCG-MCNC: 4.4 G/DL (ref 3.5–5.2)
ALP SERPL-CCNC: 78 U/L (ref 40–150)
ALT SERPL W P-5'-P-CCNC: 30 U/L (ref 0–70)
ANION GAP SERPL CALCULATED.3IONS-SCNC: 10 MMOL/L (ref 7–15)
AST SERPL W P-5'-P-CCNC: 23 U/L (ref 0–45)
BILIRUB SERPL-MCNC: 0.3 MG/DL
BUN SERPL-MCNC: 14 MG/DL (ref 6–20)
CALCIUM SERPL-MCNC: 8.9 MG/DL (ref 8.6–10)
CHLORIDE SERPL-SCNC: 103 MMOL/L (ref 98–107)
CHOLEST SERPL-MCNC: 206 MG/DL
CREAT SERPL-MCNC: 1.08 MG/DL (ref 0.67–1.17)
DEPRECATED HCO3 PLAS-SCNC: 25 MMOL/L (ref 22–29)
EGFRCR SERPLBLD CKD-EPI 2021: 85 ML/MIN/1.73M2
FASTING STATUS PATIENT QL REPORTED: ABNORMAL
GLUCOSE SERPL-MCNC: 105 MG/DL (ref 70–99)
HBA1C MFR BLD: 5.4 % (ref 0–5.6)
HDLC SERPL-MCNC: 30 MG/DL
LDLC SERPL CALC-MCNC: 153 MG/DL
NONHDLC SERPL-MCNC: 176 MG/DL
POTASSIUM SERPL-SCNC: 4.3 MMOL/L (ref 3.4–5.3)
PROT SERPL-MCNC: 6.8 G/DL (ref 6.4–8.3)
SODIUM SERPL-SCNC: 138 MMOL/L (ref 135–145)
TRIGL SERPL-MCNC: 114 MG/DL

## 2024-04-05 PROCEDURE — 36415 COLL VENOUS BLD VENIPUNCTURE: CPT | Performed by: FAMILY MEDICINE

## 2024-04-05 PROCEDURE — 99214 OFFICE O/P EST MOD 30 MIN: CPT | Mod: 25 | Performed by: FAMILY MEDICINE

## 2024-04-05 PROCEDURE — 99396 PREV VISIT EST AGE 40-64: CPT | Performed by: FAMILY MEDICINE

## 2024-04-05 PROCEDURE — 80053 COMPREHEN METABOLIC PANEL: CPT | Performed by: FAMILY MEDICINE

## 2024-04-05 PROCEDURE — 80061 LIPID PANEL: CPT | Performed by: FAMILY MEDICINE

## 2024-04-05 PROCEDURE — 83036 HEMOGLOBIN GLYCOSYLATED A1C: CPT | Performed by: FAMILY MEDICINE

## 2024-04-05 ASSESSMENT — PAIN SCALES - GENERAL: PAINLEVEL: MILD PAIN (2)

## 2024-04-05 NOTE — PROGRESS NOTES
Preventive Care Visit  [facility]  Neida Benz MD, Family Medicine  Apr 5, 2024      Assessment & Plan     Routine general medical examination at a health care facility  Reviewed chronic issues and medications/supplements.   Discussed healthy habits, eye/dental care, healthcare maintenance issues, including cancer screenings (colonoscopies, PSA), relevant immunizations, and cardiac risk factor screenings such as for cholesterol, HTN, and DM.  See orders for tests and screening needed.    No immunizations needed today.      Left lateral epicondylitis  Left elbow and forearm pain since using sledgehammer on home project a few months ago, bit better but not resolving. Exam c/w dx.  Will send for PT/Hand therapy.  - Physical Therapy  Referral; Future    MDD (major depressive disorder), recurrent episode, moderate (H)  Intermittent explosive disorder/  Irritability  Mood/anxiety symptoms under good control with fluoxetine 20mg/d.  Minimal side effects.  Refills sent.  Continue every six month follow-up.   - PRIMARY CARE FOLLOW-UP SCHEDULING  - FLUoxetine (PROZAC) 20 MG capsule; Take 1 capsule (20 mg) by mouth daily    Hyperlipidemia LDL goal <130/  Vertebral artery dissection (H24)  Fasting today, will check fasting labs. Does not need statin for dissection. Still has minor R UE residual sx's from dissection, but thinks word finding difficulty is likely back to baseline.  - Lipid panel reflex to direct LDL Non-fasting; Future  - Comprehensive metabolic panel (BMP + Alb, Alk Phos, ALT, AST, Total. Bili, TP); Future  - Hemoglobin A1c; Future    JANA (obstructive sleep apnea)  Doing well with CPAP.    Screen for colon cancer  Colonoscopy scheduled in 5/24.    Elevated glucose  - Hemoglobin A1c; Future    Patient has been advised of split billing requirements and indicates understanding: Yes          BMI  Estimated body mass index is 33.26 kg/m  as calculated from the following:     "Height as of this encounter: 1.842 m (6' 0.52\").    Weight as of this encounter: 112.9 kg (248 lb 12.8 oz).   Weight management plan: Discussed healthy diet and exercise guidelines    Counseling  Appropriate preventive services were discussed with this patient, including applicable screening as appropriate for fall prevention, nutrition, physical activity, Tobacco-use cessation, weight loss and cognition.  Checklist reviewing preventive services available has been given to the patient.  Reviewed patient's diet, addressing concerns and/or questions.                   Nehemiah Cotter is a 48 year old, presenting for the following:  Physical (Pt is fasting)        4/5/2024     7:47 AM   Additional Questions   Roomed by Vidya VALVERDE   Accompanied by n/a        Health Care Directive  Patient does not have a Health Care Directive or Living Will: Discussed advance care planning with patient; information given to patient to review.    HPI    Has colonoscopy scheduled in 5/24.  Had to move it back.    MDD/irritability- fluoxetine seems to be working well- per him and his wife.  It seems to slow down his upset reactions, which 'seems more normal' to his wife.  Se's- mild sexual se's, but better than on any of the other meds.  Tolerable.    Weight is down since 11/23.  Dieting with wife.  Does feel sustainable.  Exercise- not much, but he should get on the peloton.  Does have the nandini.  Room is a 'complete disaster', staging room for fixing up their first floor.    The inside is 97% done.    Left arm issues-   Trying to get subfloor out few months ago  Pretty sure him hitting the crowbar did something to his left forearm.  Used to hurt, doesn't any longer, but now, lifting feels weaker in left forearm.    S/p vertebral artery dissection-  Still has a bit of weakness in left right arm.  Occasional word finding issues, potentially?          4/4/2024   General Health   How would you rate your overall physical health? (!) FAIR   Feel " stress (tense, anxious, or unable to sleep) To some extent   (!) STRESS CONCERN      4/4/2024   Nutrition   Three or more servings of calcium each day? Yes   Diet: Regular (no restrictions)   How many servings of fruit and vegetables per day? (!) 2-3   How many sweetened beverages each day? 0-1         4/4/2024   Exercise   Days per week of moderate/strenous exercise 0 days   Average minutes spent exercising at this level 0 min   (!) EXERCISE CONCERN      4/4/2024   Social Factors   Frequency of gathering with friends or relatives Once a week   Worry food won't last until get money to buy more No   Food not last or not have enough money for food? No   Do you have housing?  Yes   Are you worried about losing your housing? No   Lack of transportation? No   Unable to get utilities (heat,electricity)? No         4/4/2024   Dental   Dentist two times every year? Yes         4/4/2024   TB Screening   Were you born outside of the [place]? No       Today's PHQ-9 Score:       4/4/2024     9:45 PM   PHQ-9 SCORE   PHQ-9 Total Score MyChart 3 (Minimal depression)   PHQ-9 Total Score 3         4/4/2024   Substance Use   Alcohol more than 3/day or more than 7/wk No   Do you use any other substances recreationally? No     Social History     Tobacco Use    Smoking status: Never    Smokeless tobacco: Never   Vaping Use    Vaping Use: Never used   Substance Use Topics    Alcohol use: Yes     Alcohol/week: 0.0 standard drinks of alcohol     Comment: social, 0-2 times per week    Drug use: No           4/4/2024   STI Screening   New sexual partner(s) since last STI/HIV test? No   ASCVD Risk   The ASCVD Risk score (Alona ARAUJO, et al., 2019) failed to calculate for the following reasons:    The patient has a prior MI or stroke diagnosis        4/4/2024   Contraception/Family Planning   Questions about contraception or family planning No          Reviewed and updated as needed this visit by Provider   Tobacco  Allergies  Meds   "Problems  Med Hx  Surg Hx  Fam Hx              Lab work is in process  Labs reviewed in EPIC      Review of Systems  Constitutional, neuro, ENT, endocrine, pulmonary, cardiac, gastrointestinal, genitourinary, musculoskeletal, integument and psychiatric systems are negative, except as otherwise noted.     Objective    Exam  /88   Pulse 59   Temp 97.1  F (36.2  C) (Temporal)   Resp 16   Ht 1.842 m (6' 0.52\")   Wt 112.9 kg (248 lb 12.8 oz)   SpO2 96%   BMI 33.26 kg/m     Estimated body mass index is 33.26 kg/m  as calculated from the following:    Height as of this encounter: 1.842 m (6' 0.52\").    Weight as of this encounter: 112.9 kg (248 lb 12.8 oz).    Physical Exam  GENERAL: alert and no distress  EYES: Eyes grossly normal to inspection, PERRL and conjunctivae and sclerae normal  HENT: ear canals and TM's normal, nose and mouth without ulcers or lesions  NECK: no adenopathy, no asymmetry, masses, or scars  RESP: lungs clear to auscultation - no rales, rhonchi or wheezes  CV: regular rate and rhythm, normal S1 S2, no S3 or S4, no murmur, click or rub, no peripheral edema  ABDOMEN: soft, nontender, no hepatosplenomegaly, no masses and bowel sounds normal  MS: no gross musculoskeletal defects noted, no edema  SKIN: no suspicious lesions or rashes  NEURO: Normal strength and tone, mentation intact and speech normal  PSYCH: mentation appears normal, affect normal/bright        Signed Electronically by: Neida Benz MD    Answers submitted by the patient for this visit:  Patient Health Questionnaire (Submitted on 4/4/2024)  If you checked off any problems, how difficult have these problems made it for you to do your work, take care of things at home, or get along with other people?: Not difficult at all  PHQ9 TOTAL SCORE: 3  TERRA-7 (Submitted on 4/4/2024)  TERRA 7 TOTAL SCORE: 3    "

## 2024-04-05 NOTE — PATIENT INSTRUCTIONS
Preventive Care Advice   This is general advice given by our system to help you stay healthy. However, your care team may have specific advice just for you. Please talk to your care team about your preventive care needs.  Nutrition  Eat 5 or more servings of fruits and vegetables each day.  Try wheat bread, brown rice and whole grain pasta (instead of white bread, rice, and pasta).  Get enough calcium and vitamin D. Check the label on foods and aim for 100% of the RDA (recommended daily allowance).  Lifestyle  Exercise at least 150 minutes each week   (30 minutes a day, 5 days a week).  Do muscle strengthening activities 2 days a week. These help control your weight and prevent disease.  No smoking.  Wear sunscreen to prevent skin cancer.  Have a dental exam and cleaning every 6 months.  Yearly exams  See your health care team every year to talk about:  Any changes in your health.  Any medicines your care team has prescribed.  Preventive care, family planning, and ways to prevent chronic diseases.  Shots (vaccines)   HPV shots (up to age 26), if you've never had them before.  Hepatitis B shots (up to age 59), if you've never had them before.  COVID-19 shot: Get this shot when it's due.  Flu shot: Get a flu shot every year.  Tetanus shot: Get a tetanus shot every 10 years.  Pneumococcal, hepatitis A, and RSV shots: Ask your care team if you need these based on your risk.  Shingles shot (for age 50 and up).  General health tests  Diabetes screening:  Starting at age 35, Get screened for diabetes at least every 3 years.  If you are younger than age 35, ask your care team if you should be screened for diabetes.  Cholesterol test: At age 39, start having a cholesterol test every 5 years, or more often if advised.  Bone density scan (DEXA): At age 50, ask your care team if you should have this scan for osteoporosis (brittle bones).  Hepatitis C: Get tested at least once in your life.  STIs (sexually transmitted  infections)  Before age 24: Ask your care team if you should be screened for STIs.  After age 24: Get screened for STIs if you're at risk. You are at risk for STIs (including HIV) if:  You are sexually active with more than one person.  You don't use condoms every time.  You or a partner was diagnosed with a sexually transmitted infection.  If you are at risk for HIV, ask about PrEP medicine to prevent HIV.  Get tested for HIV at least once in your life, whether you are at risk for HIV or not.  Cancer screening tests  Cervical cancer screening: If you have a cervix, begin getting regular cervical cancer screening tests at age 21. Most people who have regular screenings with normal results can stop after age 65. Talk about this with your provider.  Breast cancer scan (mammogram): If you've ever had breasts, begin having regular mammograms starting at age 40. This is a scan to check for breast cancer.  Colon cancer screening: It is important to start screening for colon cancer at age 45.  Have a colonoscopy test every 10 years (or more often if you're at risk) Or, ask your provider about stool tests like a FIT test every year or Cologuard test every 3 years.  To learn more about your testing options, visit: https://www.[employer]/477493.pdf.  For help making a decision, visit: https://bit.ly/kt58340.  Prostate cancer screening test: If you have a prostate and are age 55 to 69, ask your provider if you would benefit from a yearly prostate cancer screening test.  Lung cancer screening: If you are a current or former smoker age 50 to 80, ask your care team if ongoing lung cancer screenings are right for you.  For informational purposes only. Not to replace the advice of your health care provider. Copyright   2023 [employer]. All rights reserved. Clinically reviewed by the [facility] Transitions [facility]. Beem [id number] - REV 01/24.    Learning About Stress  What is stress?     Stress is your  body's response to a hard situation. Your body can have a physical, emotional, or mental response. Stress is a fact of life for most people, and it affects everyone differently. What causes stress for you may not be stressful for someone else.  A lot of things can cause stress. You may feel stress when you go on a job interview, take a test, or run a race. This kind of short-term stress is normal and even useful. It can help you if you need to work hard or react quickly. For example, stress can help you finish an important job on time.  Long-term stress is caused by ongoing stressful situations or events. Examples of long-term stress include long-term health problems, ongoing problems at work, or conflicts in your family. Long-term stress can harm your health.  How does stress affect your health?  When you are stressed, your body responds as though you are in danger. It makes hormones that speed up your heart, make you breathe faster, and give you a burst of energy. This is called the fight-or-flight stress response. If the stress is over quickly, your body goes back to normal and no harm is done.  But if stress happens too often or lasts too long, it can have bad effects. Long-term stress can make you more likely to get sick, and it can make symptoms of some diseases worse. If you tense up when you are stressed, you may develop neck, shoulder, or low back pain. Stress is linked to high blood pressure and heart disease.  Stress also harms your emotional health. It can make you michael, tense, or depressed. Your relationships may suffer, and you may not do well at work or school.  What can you do to manage stress?  You can try these things to help manage stress:   Do something active. Exercise or activity can help reduce stress. Walking is a great way to get started. Even everyday activities such as housecleaning or yard work can help.  Try yoga or scar chi. These techniques combine exercise and meditation. You may need  some training at first to learn them.  Do something you enjoy. For example, listen to music or go to a movie. Practice your hobby or do volunteer work.  Meditate. This can help you relax, because you are not worrying about what happened before or what may happen in the future.  Do guided imagery. Imagine yourself in any setting that helps you feel calm. You can use online videos, books, or a teacher to guide you.  Do breathing exercises. For example:  From a standing position, bend forward from the waist with your knees slightly bent. Let your arms dangle close to the floor.  Breathe in slowly and deeply as you return to a standing position. Roll up slowly and lift your head last.  Hold your breath for just a few seconds in the standing position.  Breathe out slowly and bend forward from the waist.  Let your feelings out. Talk, laugh, cry, and express anger when you need to. Talking with supportive friends or family, a counselor, or a bunny leader about your feelings is a healthy way to relieve stress. Avoid discussing your feelings with people who make you feel worse.  Write. It may help to write about things that are bothering you. This helps you find out how much stress you feel and what is causing it. When you know this, you can find better ways to cope.  What can you do to prevent stress?  You might try some of these things to help prevent stress:  Manage your time. This helps you find time to do the things you want and need to do.  Get enough sleep. Your body recovers from the stresses of the day while you are sleeping.  Get support. Your family, friends, and community can make a difference in how you experience stress.  Limit your news feed. Avoid or limit time on social media or news that may make you feel stressed.  Do something active. Exercise or activity can help reduce stress. Walking is a great way to get started.  Where can you learn more?  Go to https://www.healthwise.net/patiented  Enter N032 in the  "search box to learn more about \"Learning About Stress.\"  Current as of: October 24, 2023               Content Version: 14.0    9586-2457 [employer].   Care instructions adapted under license by your healthcare professional. If you have questions about a medical condition or this instruction, always ask your healthcare professional. [employer] disclaims any warranty or liability for your use of this information.      "

## 2024-04-05 NOTE — COMMUNITY RESOURCES LIST (ENGLISH)
April 5, 2024           YOUR PERSONALIZED LIST OF SERVICES & PROGRAMS           & RECREATION    Sports      Park [employer] Recreation Board - Sports clubs and recreational activities - [place] [facility]  [address] [place] [address] (Distance: 2.0 miles)  Language: English  Fee: Free, Self pay      [employer] - Tuesday Night Community Run  [address] [place] [address] (Distance: 1.4 miles)  Phone: [phone]  Website: http://www.[employer]/  Language: English  Fee: Free  Accessibility: Ada accessible      [facility] - Adult Enrichment  Phone: [phone]  Website: https://eYeka/adults-seniors/adult-enrichment/  Language: English  Hours: Mon 7:30 AM - 4:00 PM Tue 7:30 AM - 4:00 PM Wed 7:30 AM - 4:00 PM Thu 7:30 AM - 4:00 PM Fri 7:30 AM - 4:00 PM    Classes/Groups      in [employer] [place] [address] (Distance: 4.7 miles)  Phone: [phone]  Website: http://www.[facility].mn/  Language: English  Fee: Free      [employer] - Taekwondo Classes  [address] [place] [address] (Distance: 0.4 miles)  Phone: [phone]  Language: English  Fee: Free, Self pay      [facility] - Adult Enrichment  Phone: [phone]  Website: https://e[employer]a/adults-seniors/adult-enrichment/  Language: English  Hours: Mon 7:30 AM - 4:00 PM Tue 7:30 AM - 4:00 PM Wed 7:30 AM - 4:00 PM Thu 7:30 AM - 4:00 PM Fri 7:30 AM - 4:00 PM               IMPORTANT NUMBERS & WEBSITES        Emergency Services  911  .   United Way  211 http://211unitedway.org  .   Poison Control  (900) 999-7780 http://[place]poison.org http://w[place]poison.org  .     Suicide and Crisis Lifeline  988 http://988lifeline.org  .   Childhelp [place] Child Abuse Hotline  591.146.2539 http://Childhelphotline.org   .   National Sexual Assault Hotline  (535) 132-6361 ([place])  http://Rainn.org   .     National Runaway Safeline  (808) 649-7196 ([place]) http://[employer]anco.org  .   Pregnancy & Postpartum Support  Call/text 500-894-2455  [place]: http://ppsupportmn.org  [place]: http://psichapters.com/wi  .   Substance Abuse National Helpline ([facility])  339-576-HELP (9443) http://Findtreatment.gov   .                DISCLAIMER: These resources have been generated via the [employer] Platform. [employer] does not endorse any service providers mentioned in this resource list. [employer] does not guarantee that the services mentioned in this resource list will be available to you or will improve your health or wellness.    [facility]

## 2024-04-08 NOTE — RESULT ENCOUNTER NOTE
-Your comprehensive metabolic panel (CMP, which includes electrolyte levels, blood sugar levels, and kidney and liver function tests) looks good other than the fasting glucose which 105 is in the pre-diabetic range (<100 is normal), though your hemoglobin A1C (the three month average of your glucose levels) is back in the normal range.    -Your cholesterol panel looks a bit worse again with a higher LDL (the bad cholesterol, at 153 up from 142 last year and the 120s in previous years) and a low HDL (the good cholesterol).  The higher LDL is usually diet related, and can be improved by focusing on really increasing the fruits, vegetables, legumes, and whole grains in your diet, and working on cutting back on foods with high fat and calorie contents.  Let us know if you think you can come in for fasting labs before your next physical so we can discuss the results at your appointment.    Rehan Gonzalez MD

## 2024-04-19 NOTE — TELEPHONE ENCOUNTER
Rescheduled Procedure    Pre visit planning completed.      Procedure details:    Patient scheduled for Colonoscopy  on 5/3/24.     Arrival time: 0845. Procedure time 0930    Facility location: [facility]; [address] [place] [address] [place] [address]. Check in location: [facility].     Sedation type: Conscious sedation     Pre op exam needed? N/A    Indication for procedure: Screening      Chart review:     Electronic implanted devices? No    Recent diagnosis of diverticulitis within the last 6 weeks? No    Diabetic? No      Medication review:    Anticoagulants? No    NSAIDS? No NSAID medications per patient's medication list.  RN will verify with pre-assessment call.    Other medication HOLDING recommendations:  N/A      Prep for procedure:     Bowel prep recommendation: Standard Miralax  Due to: standard bowel prep.    Prep instructions sent via [employer] Development[employer]encia Scott RN  Endoscopy Procedure Pre Assessment RN  [phone] option 4

## 2024-04-19 NOTE — TELEPHONE ENCOUNTER
Attempted to contact patient in order to complete pre assessment questions.     No answer. Left message to return call to [phone] option 4    Callback required communication sent via [employer].      Kat cSott RN  Endoscopy Procedure Pre Assessment RN

## 2024-04-24 NOTE — TELEPHONE ENCOUNTER
Pre assessment completed for upcoming procedure.   (Please see previous telephone encounter notes for complete details)      Procedure details:    Arrival time and facility location reviewed.    Pre op exam needed? N/A    Designated  policy reviewed. Instructed to have someone stay 6 hours post procedure.       Medication review:    Medications reviewed. Please see supporting documentation below. Holding recommendations discussed (if applicable).   NSAID medication(s): Ibuprofen (Advil, Motrin): HOLD 1 day before procedure.  Naproxen (Aleve, Naprosyn): HOLD 4 days before procedure.       Prep for procedure:     Patient declined to review procedure prep instructions on the phone. Patient stated they have already reviewed the bowel prep instructions from back before they rescheduled the procedure. Patient stated they will look them over again. Patient also stated their wife is familiar with the process and will be helping them with the bowel prep instructions. Instructed patient to review the procedure prep instructions again at least 7 days prior to procedure due to necessary dietary modifications. NPO instructions reviewed.    Patient was instructed to call if any questions or concerns arise.       Any additional information needed:  N/A      Patient verbalized understanding and had no questions or concerns at this time.      Kat Scott RN  Endoscopy Procedure Pre Assessment RN  [phone] option 4

## 2024-05-03 ENCOUNTER — HOSPITAL ENCOUNTER (OUTPATIENT)
Facility: CLINIC | Age: 49
Discharge: HOME | End: 2024-05-03
Attending: INTERNAL MEDICINE | Admitting: INTERNAL MEDICINE
Payer: COMMERCIAL

## 2024-05-03 VITALS
SYSTOLIC BLOOD PRESSURE: 113 MMHG | RESPIRATION RATE: 10 BRPM | OXYGEN SATURATION: 96 % | HEART RATE: 65 BPM | DIASTOLIC BLOOD PRESSURE: 66 MMHG

## 2024-05-03 LAB — COLONOSCOPY: NORMAL

## 2024-05-03 PROCEDURE — G0500 MOD SEDAT ENDO SERVICE >5YRS: HCPCS | Performed by: INTERNAL MEDICINE

## 2024-05-03 PROCEDURE — 45378 DIAGNOSTIC COLONOSCOPY: CPT | Performed by: INTERNAL MEDICINE

## 2024-05-03 PROCEDURE — 250N000011 HC RX IP 250 OP 636: Performed by: INTERNAL MEDICINE

## 2024-05-03 PROCEDURE — 258N000003 HC RX IP 258 OP 636: Performed by: INTERNAL MEDICINE

## 2024-05-03 PROCEDURE — G0121 COLON CA SCRN NOT HI RSK IND: HCPCS | Performed by: INTERNAL MEDICINE

## 2024-05-03 RX ORDER — FENTANYL CITRATE 50 UG/ML
INJECTION, SOLUTION INTRAMUSCULAR; INTRAVENOUS PRN
Status: DISCONTINUED | OUTPATIENT
Start: 2024-05-03 | End: 2024-05-03 | Stop reason: HOSPADM

## 2024-05-03 RX ORDER — SODIUM CHLORIDE 9 MG/ML
INJECTION, SOLUTION INTRAVENOUS CONTINUOUS PRN
Status: DISCONTINUED | OUTPATIENT
Start: 2024-05-03 | End: 2024-05-03 | Stop reason: HOSPADM

## 2024-05-03 ASSESSMENT — ACTIVITIES OF DAILY LIVING (ADL): ADLS_ACUITY_SCORE: 38

## 2024-05-06 NOTE — RESULT ENCOUNTER NOTE
Colonoscopy report reviewed, no polyps.    Follow-up recommended in 10 years.   reviewed and is correct.  Rehan Benz MD

## 2024-07-30 ENCOUNTER — TELEPHONE (OUTPATIENT)
Dept: SLEEP MEDICINE | Facility: CLINIC | Age: 49
End: 2024-07-30
Payer: COMMERCIAL

## 2024-07-30 DIAGNOSIS — G47.33 OSA (OBSTRUCTIVE SLEEP APNEA): Primary | Chronic | ICD-10-CM

## 2024-07-30 NOTE — TELEPHONE ENCOUNTER
General Call      Reason for Call:  CPAP mask and supplies    What are your questions or concerns:  needs new mask due to dog wrecking it    Date of last appointment with provider: 2018 but has an appt in december    Could we [facility] this information to you [facility] or would you prefer to receive a phone call?:   Patient would prefer a phone call   Okay to leave a detailed message?: Yes at Cell number on file:    Telephone Information:   Mobile [phone]

## 2024-08-01 NOTE — TELEPHONE ENCOUNTER
Last ov 3/2/22  Next ov 12/10/24    Patient requesting updated order for CPAP supplies prior to appointment. Order pended and routed to provider for consideration.    Patient uses [facility]    Yanni BRADSHAW RN  [facility]

## 2024-09-26 ENCOUNTER — LAB REQUISITION (OUTPATIENT)
Dept: LAB | Facility: CLINIC | Age: 49
End: 2024-09-26
Payer: COMMERCIAL

## 2024-09-26 DIAGNOSIS — D22.4 MELANOCYTIC NEVI OF SCALP AND NECK: ICD-10-CM

## 2024-09-26 PROCEDURE — 88305 TISSUE EXAM BY PATHOLOGIST: CPT | Mod: TC,ORL | Performed by: DERMATOLOGY

## 2024-09-26 PROCEDURE — 88305 TISSUE EXAM BY PATHOLOGIST: CPT | Mod: 26 | Performed by: DERMATOLOGY

## 2024-09-30 LAB
PATH REPORT.COMMENTS IMP SPEC: NORMAL
PATH REPORT.COMMENTS IMP SPEC: NORMAL
PATH REPORT.FINAL DX SPEC: NORMAL
PATH REPORT.GROSS SPEC: NORMAL
PATH REPORT.MICROSCOPIC SPEC OTHER STN: NORMAL
PATH REPORT.RELEVANT HX SPEC: NORMAL

## 2024-11-17 NOTE — RESULT ENCOUNTER NOTE
Here are your lab results from your recent visit...  -Your HIV screening test is negative.  -Your basic metabolic panel (which includes electrolyte levels, blood sugar level and kidney function tests) is essentially normal.  -Your cholesterol panel looks mildly abnormal with a slightly higher LDL (the bad cholesterol) and a low HDL (the good cholesterol).  The HDL is often raised by increasing aerobic activity, which would be a good thing for you (if it doesn't worsen your fatigue, but it also could help if you add it slowly).      Please let me know if you have any questions.  Best,   Rehan Benz MD    
elbow

## 2024-12-04 ENCOUNTER — OFFICE VISIT (OUTPATIENT)
Dept: FAMILY MEDICINE | Facility: CLINIC | Age: 49
End: 2024-12-04
Attending: FAMILY MEDICINE
Payer: COMMERCIAL

## 2024-12-04 VITALS
RESPIRATION RATE: 16 BRPM | WEIGHT: 253 LBS | OXYGEN SATURATION: 94 % | HEART RATE: 68 BPM | SYSTOLIC BLOOD PRESSURE: 145 MMHG | BODY MASS INDEX: 33.82 KG/M2 | TEMPERATURE: 97.8 F | DIASTOLIC BLOOD PRESSURE: 86 MMHG

## 2024-12-04 DIAGNOSIS — R45.4 IRRITABILITY: ICD-10-CM

## 2024-12-04 DIAGNOSIS — F63.81 INTERMITTENT EXPLOSIVE DISORDER: ICD-10-CM

## 2024-12-04 DIAGNOSIS — Z71.85 VACCINE COUNSELING: ICD-10-CM

## 2024-12-04 DIAGNOSIS — F33.1 MDD (MAJOR DEPRESSIVE DISORDER), RECURRENT EPISODE, MODERATE (H): Primary | Chronic | ICD-10-CM

## 2024-12-04 DIAGNOSIS — I69.30 PERSONAL HISTORY OF STROKE WITH CURRENT RESIDUAL EFFECTS: ICD-10-CM

## 2024-12-04 PROCEDURE — 90656 IIV3 VACC NO PRSV 0.5 ML IM: CPT | Performed by: FAMILY MEDICINE

## 2024-12-04 PROCEDURE — 90480 ADMN SARSCOV2 VAC 1/ONLY CMP: CPT | Performed by: FAMILY MEDICINE

## 2024-12-04 PROCEDURE — 90746 HEPB VACCINE 3 DOSE ADULT IM: CPT | Performed by: FAMILY MEDICINE

## 2024-12-04 PROCEDURE — 99213 OFFICE O/P EST LOW 20 MIN: CPT | Mod: 25 | Performed by: FAMILY MEDICINE

## 2024-12-04 PROCEDURE — 90472 IMMUNIZATION ADMIN EACH ADD: CPT | Performed by: FAMILY MEDICINE

## 2024-12-04 PROCEDURE — 90471 IMMUNIZATION ADMIN: CPT | Performed by: FAMILY MEDICINE

## 2024-12-04 PROCEDURE — 91320 SARSCV2 VAC 30MCG TRS-SUC IM: CPT | Performed by: FAMILY MEDICINE

## 2024-12-04 ASSESSMENT — ANXIETY QUESTIONNAIRES
4. TROUBLE RELAXING: SEVERAL DAYS
5. BEING SO RESTLESS THAT IT IS HARD TO SIT STILL: SEVERAL DAYS
8. IF YOU CHECKED OFF ANY PROBLEMS, HOW DIFFICULT HAVE THESE MADE IT FOR YOU TO DO YOUR WORK, TAKE CARE OF THINGS AT HOME, OR GET ALONG WITH OTHER PEOPLE?: NOT DIFFICULT AT ALL
6. BECOMING EASILY ANNOYED OR IRRITABLE: SEVERAL DAYS
3. WORRYING TOO MUCH ABOUT DIFFERENT THINGS: NOT AT ALL
7. FEELING AFRAID AS IF SOMETHING AWFUL MIGHT HAPPEN: SEVERAL DAYS
7. FEELING AFRAID AS IF SOMETHING AWFUL MIGHT HAPPEN: SEVERAL DAYS
1. FEELING NERVOUS, ANXIOUS, OR ON EDGE: NOT AT ALL
IF YOU CHECKED OFF ANY PROBLEMS ON THIS QUESTIONNAIRE, HOW DIFFICULT HAVE THESE PROBLEMS MADE IT FOR YOU TO DO YOUR WORK, TAKE CARE OF THINGS AT HOME, OR GET ALONG WITH OTHER PEOPLE: NOT DIFFICULT AT ALL
2. NOT BEING ABLE TO STOP OR CONTROL WORRYING: NOT AT ALL
GAD7 TOTAL SCORE: 4

## 2024-12-04 ASSESSMENT — PATIENT HEALTH QUESTIONNAIRE - PHQ9
SUM OF ALL RESPONSES TO PHQ QUESTIONS 1-9: 4
SUM OF ALL RESPONSES TO PHQ QUESTIONS 1-9: 4
10. IF YOU CHECKED OFF ANY PROBLEMS, HOW DIFFICULT HAVE THESE PROBLEMS MADE IT FOR YOU TO DO YOUR WORK, TAKE CARE OF THINGS AT HOME, OR GET ALONG WITH OTHER PEOPLE: NOT DIFFICULT AT ALL

## 2024-12-04 ASSESSMENT — ENCOUNTER SYMPTOMS: NERVOUS/ANXIOUS: 1

## 2024-12-04 NOTE — PROGRESS NOTES
"  Assessment & Plan     MDD (major depressive disorder), recurrent episode, moderate (H)  Intermittent explosive disorder  Irritability  Mood/anxiety symptoms under fair control with fluoxetine 20mg/d. Mostly notices issues with motivation lately.  Feels stems from frustrations at work, but also showering too little at home.  He'll try and get back to the pelNGRAINn rides he was doing 3-4x/wk (none x 2 wks), which helps him shower more, and can also help mood/motivation.  If not improving, do evisit with details of updates, and consider increasing fluoxetine to 30mg/d.  No side effects.   Will continue fluoxetine at 20mg/d for now.   Refills sent. Follow-up in 3 months if continuing at the 20mg/d dose (sooner as above if not).   - PRIMARY CARE FOLLOW-UP SCHEDULING  - PRIMARY CARE FOLLOW-UP SCHEDULING; Future  - FLUoxetine (PROZAC) 20 MG capsule; Take 1 capsule (20 mg) by mouth daily.    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  Typing getting better and better, does it daily, but still having trouble playing guHoffmeister Leuchtenr.  Considering PT at [facility]- will msg if needing a referral.  - PRIMARY CARE FOLLOW-UP SCHEDULING; Future    Vaccine counseling  Risks/benefits discussed, given today.   - INFLUENZA VACCINE,SPLIT VIRUS,TRIVALENT,PF(FLUZONE)  - COVID-19 12+ (PFIZER)  - HEPATITIS B, ADULT 20+ (ENGERIX-B/RECOMBIVAX HB)          BMI  Estimated body mass index is 33.82 kg/m  as calculated from the following:    Height as of 4/5/24: 1.842 m (6' 0.52\").    Weight as of this encounter: 114.8 kg (253 lb).   Weight management plan: Discussed healthy diet and exercise guidelines                  Nehemiah Cotter is a 49 year old, presenting for the following health issues:  Depression and Anxiety        12/4/2024     9:34 AM   Additional Questions   Roomed by Mady     History of Present Illness       Mental Health Follow-up:  Patient presents to follow-up " on Depression & Anxiety.Patient's depression since last visit has been:  Better  The patient is not having other symptoms associated with depression.  Patient's anxiety since last visit has been:  Better  The patient is not having other symptoms associated with anxiety.  Any significant life events: No  Patient is not feeling anxious or having panic attacks.  Patient has no concerns about alcohol or drug use.    He eats 2-3 servings of fruits and vegetables daily.He consumes 0 sweetened beverage(s) daily.He exercises with enough effort to increase his heart rate 9 or less minutes per day.  He exercises with enough effort to increase his heart rate 3 or less days per week.   He is taking medications regularly.     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  New kitchen- just finished ~3 months ago.    MDD/anxiety/irritability-   Pretty good...   Doesn't seem to have a lot of motivation, but wonders if that could be partly due to work?  Thinking about...  Doesn't agree with the direction they are going, trying to change it.  Succeeding in some areas but not in others.  Irritability is okay, but affects his motivation- both at work and home per his wife.  Mood/anxiety feels generally pretty good.  Can still get mad, but over the course of 30 seconds instead of 1/3 of a second.  When he ramps up more slowly, he can work on it, or wife can see he's ramping up.    Wife- noticed his self care isn't as good.  Showering less often- was showering daily when he went to the office, now 3-4 days working from home.  He feels 'she's totally right'.  What stops him?  Doesn't feel like it's as beneficial, so doesn't do it, also laziness.  Water heater was crappy, just got a new one- so now much nicer.      Exercise- was until trip to [place] before [place].  Was getting on the peloton 4-5x/wk.  Did notice a difference...  Initially, he was exhausted afterwards, but he would feel better.  Eventually, just felt better when he did it  regularly.  Hasn't done it at all for past two weeks.  Feels like he needs to shower afterwards.      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  Had done PT in past- [facility].  Wife went to [facility] for PT- had a great experience, so hoping to try there.        Review of Systems  Constitutional, neuro, ENT, endocrine, pulmonary, cardiac, gastrointestinal, genitourinary, musculoskeletal, integument and psychiatric systems are negative, except as otherwise noted.      Objective    BP (!) 145/86   Pulse 68   Temp 97.8  F (36.6  C) (Temporal)   Resp 16   Wt 114.8 kg (253 lb)   SpO2 94%   BMI 33.82 kg/m    Body mass index is 33.82 kg/m .  Physical Exam   GENERAL: alert and no distress  EYES: Eyes grossly normal to inspection, PERRL and conjunctivae and sclerae normal  NECK: no adenopathy, no asymmetry, masses, or scars  RESP: lungs clear to auscultation - no rales, rhonchi or wheezes  CV: regular rate and rhythm, normal S1 S2, no S3 or S4, no murmur, click or rub, no peripheral edema  MS: no gross musculoskeletal defects noted, no edema  NEURO: Normal strength and tone, mentation intact and speech normal  PSYCH: mentation appears normal, affect normal/bright        Signed Electronically by: Neida Benz MD

## 2024-12-04 NOTE — PROGRESS NOTES
{PROVIDER CHARTING PREFERENCE:103268}    Subjective   Vahe is a 49 year old, presenting for the following health issues:  Depression and Anxiety        12/4/2024     9:34 AM   Additional Questions   Roomed by Ninfa     Anxiety    History of Present Illness       Mental Health Follow-up:  Patient presents to follow-up on Depression & Anxiety.Patient's depression since last visit has been:  Better  The patient is not having other symptoms associated with depression.  Patient's anxiety since last visit has been:  Better  The patient is not having other symptoms associated with anxiety.  Any significant life events: No  Patient is not feeling anxious or having panic attacks.  Patient has no concerns about alcohol or drug use.    He eats 2-3 servings of fruits and vegetables daily.He consumes 0 sweetened beverage(s) daily.He exercises with enough effort to increase his heart rate 9 or less minutes per day.  He exercises with enough effort to increase his heart rate 3 or less days per week.   He is taking medications regularly.       {MA/LPN/RN Pre-Provider Visit Orders- hCG/UA/Strep (Optional):781838}  {SUPERLIST (Optional):928213}  {additonal problems for provider to add (Optional):636774}    {ROS Picklists (Optional):876757}      Objective    There were no vitals taken for this visit.  There is no height or weight on file to calculate BMI.  Physical Exam   {Exam List (Optional):862150}    {Diagnostic Test Results (Optional):461909}        Signed Electronically by: Neida Benz MD  {Email feedback regarding this note to [email or web address]   :[id number]}

## 2024-12-09 NOTE — PROGRESS NOTES
CPAP Follow-Up Visit:    Date on this visit: 12/10/2024    Vahe Haas has a follow-up visit today to review his CPAP use for JANA.      He was initially seen at the [facility] for twitching and snoring that is bothering his wife for years. He was referred for concerns of insomnia. His medical history is significant for vertebral artery dissection, depression and allergic rhinitis. He has a history of seizures in his early 20's.     PSG on 5/10/2016 (230#) showed: AHI was 41.5/hr, with desaturations down to 87%. He spent 1.6 minutes below 90% SpO2 and 0.6 minutes below 89%. RDI 41.5/hr.  REM RDI N/A.  Supine RDI N/A.  Periodic Limb Movement Index 63/hour, 6.5/hr were associated with arousals. The PLM indices were essentially unchanged on CPAP. His arousal index was 71.7/hr on the baseline (42/hr were spontaneous). On CPAP, his arousal index was 29/hr, 20/hr were spontaneous.    He was last seen by my colleague, Reilly Goode, in 3/2022 and me in 2018. He has been on auto CPAP 7-12 cm.    ResMed 7-12 cm (obtained 6/2016)  The compliance data shows that from 9/11/24-12/09/24, the patient used the CPAP for 90/90 nights, 99% of nights for >4 hours.  The 95th% pressure is 11.9 cm.  The 95th% leak is 0 lpm.  The average nightly usage is 8:47.  The average AHI is 0.5/hr.       He is told he snores through the mask, all night long. He takes loratadine.   He is also told he kicks in his sleep, more bothersome to wife than him. He denies restless legs symptoms. His only medications are fluoxetine and loratadine. He had a stroke in 2019, he has had low energy. He yawns a lot, unless he has a lot of caffeine. He denies napping or inadvertent dozing. Naps interfere with nighttime sleep.  The power button is not working well. His machine is reading a message that the expected motor life has been exceeded.        Do you use a CPAP Machine at home: (Patient-Rptd) Yes  Overall, on a scale of 0-10 how would you rate  "your CPAP (0 poor, 10 great): (Patient-Rptd) 5    What type of mask do you use: (Patient-Rptd) Nasal Mask AirFit N20  Is your mask comfortable: (Patient-Rptd) Yes  If not, why:    How often do you replace supplies: \"not as often as I should.\" He is not on the reminder call list.    Is your mask leaking: (Patient-Rptd) No  If yes, where do you feel it:    How many night per week does the mask leak (0-7):      Do you notice snoring with mask on: (Patient-Rptd) Yes wife says it is all night long.   Do you notice gasping arousals with mask on: (Patient-Rptd) No  Are you having significant oral or nasal dryness: (Patient-Rptd) No  Are you using the humidifier: yes  Does the water chamber run out before the night is over:no  Do you get condensation in the mask or hose:no  Is the pressure setting comfortable: (Patient-Rptd) Yes  If not, why:      Typical bedtime: (Patient-Rptd) 10:00  Sleep latency on PAP therapy: (Patient-Rptd) hour, sometimes he has a little trouble getting to sleep, just does not feel that tired. He also has a dog in bed that makes him sleep on his back and he does not like sleeping in that position.    Typical wake time: (Patient-Rptd) 8:00 AM  Wakes 0-1 times per night for 10 minutes. Reason for waking: dogs  How many hours on average per night are you using PAP therapy: (Patient-Rptd) 8  How many hours are you sleeping per night: (Patient-Rptd) 8  Do you feel well rested in the morning: (Patient-Rptd) No. He is lethargic until he gets some coffee.    Naps: 0          Weight change since sleep study: 259 lbs      Past medical/surgical history, family history, social history, medications and allergies were reviewed.      Problem List:  Patient Active Problem List    Diagnosis Date Noted    Personal history of stroke with current residual effects 12/04/2024     Priority: Medium     9/18 vertebral artery dissection  R arm/hand residual sx's.  Very little speech residual sx's.      Anxiety 07/08/2021     " Priority: Medium    Irritability 07/08/2021     Priority: Medium    Elevated BP without diagnosis of hypertension 07/08/2021     Priority: Medium    Right inguinal hernia 07/08/2021     Priority: Medium     see genral surgeon      History of seizure 02/13/2019     Priority: Medium     Grand mal seizure after accident & concussion ~age 21, last seizure around age ~32 (~3 days of no sleep, dizzy, fell down stairs, unsure if seizure was before or after the fall, never conclusively determined).      Vertebral artery dissection (H) 09/03/2018     Priority: Medium     Vertebral Artery Dissection, with subsequent Acute CVA- Lt PCA and RT PICA in 9/18. Supportive cares, no surgery or anti-thrombotics initially.  Started on asa 325mg.    No need for echo/lipids as not atherogenic cause, though possibly from violent cough a month earlier.    OT/Speech therapy completed, but sent back to OT in 7/19 for persistent right UE fine motor movements and multitasking issues (also with milder but persistent dysarthria, did not send back to speech therapy).    F/u with neurology in 2/19, rec prn f/u thereafter.  Rec cont asa 325mg/day for a year, then can stop (did not think significant benefit or harm from being on asa).    Avoid strenuous activity, chiropractic neck adjustments.      Acute bilateral thoracic back pain 11/08/2016     Priority: Medium    Acute bilateral low back pain without sciatica 11/08/2016     Priority: Medium    JANA (obstructive sleep apnea) 05/24/2016     Priority: Medium     PSG on 5/10/2016 (230#) showed: AHI was 41.5/hr, with desaturations down to 87%. He spent 1.6 minutes below 90% SpO2 and 0.6 minutes below 89%. RDI 41.5/hr.  REM RDI N/A.  Supine RDI N/A.  Periodic Limb Movement Index 63/hour, 6.5/hr were associated with arousals. The PLM indices were essentially unchanged on CPAP. His arousal index was 71.7/hr on the baseline (42/hr were spontaneous). On CPAP, his arousal index was 29/hr, 20/hr were  spontaneous.      Drug-induced erectile dysfunction 11/13/2015     Priority: Medium     Viagra not helpful.  Cialis more helpful (tried once), but expensive.      MDD (major depressive disorder), recurrent episode, moderate (H) 02/24/2011     Priority: Medium     Fluoxetine 20mg/d (best option to help with anger spikes, no sexual se's, started 4/23)    Cymbalta 60mg/d (switch from effexor in '21), off in 4/23    Effexor XR 75mg/d - started in 8/20.  Sexual se's on zoloft.  Minimal improvement in sx's on wellbutrin alone (and increased claustrophobia).    1st dx with MDD in '09.  Mother has MDD.  Started on citalopram in '09, but felt dulled, off, libido issues.  Switched to wellbutrin in 12/10 with improvement in mood, less se's.  Does have h/o sz's (in his 20s after concussion, last one at age 32 before or after fall down stairs)- none on wellbutrin.  Switched from wellbutrin to zoloft 100mg in 6/13.  Sexual se's, so added back low-dose wellbutrin (50mg/d- 7/13).  He is aware of the increased seizure risk with wellbutrin.  '15- zoloft 100mg/d.  Wellbutrin XL 150mg/d in 10/15.  Thinks it's helpful- feels more level.        Hyperlipidemia LDL goal <130 10/31/2010     Priority: Medium     ASCVD risk score 4.8% in 4/24 (rec cont work diet/exercise)    Recent Labs   Lab Test 04/05/24  0844 11/15/22  0911   CHOL 206* 196   HDL 30* 27*   * 142*   TRIG 114 137          Insomnia 05/22/2007     Priority: Medium     Improved with exercise.        Allergic rhinitis      Priority: Medium     Allergic rhinitis.  Has been doing allergy shots since '12, helping.   Problem list name updated by automated process. Provider to review          Impression/Plan:    (G47.33) JANA (obstructive sleep apnea)  (primary encounter diagnosis)  Comment: Vahe is using CPAP nightly and benefits from use. His machine is reading a message that the expected motor life has been exceeded. His wife notices snoring all night. His leak is 0 and AHI  is 0.5/hr, though. There is no evidence of mouth leak. He has daytime fatigue/yawning since having a stroke in 2019, denies napping or dozing.   Plan: Comprehensive DME        Order placed for a replacement CPAP with increased pressures of 9-14 cm to address residual snoring. I adjusted pressures on his current machine as well.  A prescription was written for new supplies. We reviewed recommendations for cleaning and replacing supplies. I set his current machine to give him reminders of when it is time to replace supplies.        (G47.61) PLMD (periodic limb movement disorder)  Comment: Wife is bothered by him kicking in his sleep. His PSG showed a PLM index of 63/hr, about 6/hr associated with arousals. He denies restless legs in the day/evening.  Plan: Ferritin        Will check ferritin. May consider gabapentin if ferritin is normal and increased CPAP pressures don't resolve the kicking. We will see if treating kicking helps his energy (but will wait until after he is on the new CPAP so we are not adjusting 2 therapies at once). If he does not notice a benefit to treating the leg kicking, I would not push for him to continue treatment unless he wants to continue for his wife's benefit. Alternatively, they could consider a bigger bed to give them more space.        He will follow up with me in about 6 month(s).     40 minutes were spent on the date of the encounter doing chart review, history and exam, documentation and further activities as noted above.     Bennett Goltz, PA-C    CC: No ref. provider found

## 2024-12-10 ENCOUNTER — OFFICE VISIT (OUTPATIENT)
Dept: SLEEP MEDICINE | Facility: CLINIC | Age: 49
End: 2024-12-10
Payer: COMMERCIAL

## 2024-12-10 VITALS
HEART RATE: 59 BPM | SYSTOLIC BLOOD PRESSURE: 135 MMHG | HEIGHT: 73 IN | OXYGEN SATURATION: 94 % | BODY MASS INDEX: 34.33 KG/M2 | WEIGHT: 259 LBS | DIASTOLIC BLOOD PRESSURE: 83 MMHG

## 2024-12-10 DIAGNOSIS — G47.33 OSA (OBSTRUCTIVE SLEEP APNEA): Primary | Chronic | ICD-10-CM

## 2024-12-10 DIAGNOSIS — G47.61 PLMD (PERIODIC LIMB MOVEMENT DISORDER): ICD-10-CM

## 2024-12-10 PROCEDURE — 99215 OFFICE O/P EST HI 40 MIN: CPT | Performed by: PHYSICIAN ASSISTANT

## 2024-12-10 PROCEDURE — G2211 COMPLEX E/M VISIT ADD ON: HCPCS | Performed by: PHYSICIAN ASSISTANT

## 2024-12-10 RX ORDER — LORATADINE 10 MG/1
10 TABLET ORAL DAILY
COMMUNITY
Start: 2024-12-10

## 2024-12-10 ASSESSMENT — SLEEP AND FATIGUE QUESTIONNAIRES
HOW LIKELY ARE YOU TO NOD OFF OR FALL ASLEEP WHEN YOU ARE A PASSENGER IN A CAR FOR AN HOUR WITHOUT A BREAK: WOULD NEVER DOZE
HOW LIKELY ARE YOU TO NOD OFF OR FALL ASLEEP WHILE WATCHING TV: SLIGHT CHANCE OF DOZING
HOW LIKELY ARE YOU TO NOD OFF OR FALL ASLEEP WHILE SITTING QUIETLY AFTER LUNCH WITHOUT ALCOHOL: WOULD NEVER DOZE
HOW LIKELY ARE YOU TO NOD OFF OR FALL ASLEEP WHILE LYING DOWN TO REST IN THE AFTERNOON WHEN CIRCUMSTANCES PERMIT: SLIGHT CHANCE OF DOZING
HOW LIKELY ARE YOU TO NOD OFF OR FALL ASLEEP WHILE SITTING AND TALKING TO SOMEONE: WOULD NEVER DOZE
HOW LIKELY ARE YOU TO NOD OFF OR FALL ASLEEP WHILE SITTING AND READING: WOULD NEVER DOZE
HOW LIKELY ARE YOU TO NOD OFF OR FALL ASLEEP IN A CAR, WHILE STOPPED FOR A FEW MINUTES IN TRAFFIC: WOULD NEVER DOZE
HOW LIKELY ARE YOU TO NOD OFF OR FALL ASLEEP WHILE SITTING INACTIVE IN A PUBLIC PLACE: WOULD NEVER DOZE

## 2024-12-10 NOTE — NURSING NOTE
"Chief Complaint   Patient presents with    Sleep Problem     Reestablish care, New prescription       Initial /83   Pulse 59   Ht 1.842 m (6' 0.5\")   Wt 117.5 kg (259 lb)   SpO2 94%   BMI 34.64 kg/m   Estimated body mass index is 34.64 kg/m  as calculated from the following:    Height as of this encounter: 1.842 m (6' 0.5\").    Weight as of this encounter: 117.5 kg (259 lb).    Medication Reconciliation: complete  ESS 2  Neck circumference: 46 centimeters.  Reena Talbot MA   "

## 2025-03-06 ENCOUNTER — PATIENT OUTREACH (OUTPATIENT)
Dept: CARE COORDINATION | Facility: CLINIC | Age: 50
End: 2025-03-06
Payer: COMMERCIAL

## 2025-03-20 ENCOUNTER — PATIENT OUTREACH (OUTPATIENT)
Dept: CARE COORDINATION | Facility: CLINIC | Age: 50
End: 2025-03-20
Payer: COMMERCIAL

## 2025-05-06 NOTE — PROVIDER NOTIFICATION
"Dr. Fallon paged, \"Received call from MD in [place]. Per MD, slightly worse edema noted. Thank you.\" Awaiting return call/new orders.  " Continue Regimen: Azelaic Acid 15% gel QD aaa Detail Level: Zone Render In Strict Bullet Format?: No Plan: - start applying adapalene 0.3% gel pea size amount to the face 3 nights a week then increase to nightly as tolerated

## 2025-05-24 ENCOUNTER — HEALTH MAINTENANCE LETTER (OUTPATIENT)
Age: 50
End: 2025-05-24

## 2025-06-25 DIAGNOSIS — F33.1 MDD (MAJOR DEPRESSIVE DISORDER), RECURRENT EPISODE, MODERATE (H): Chronic | ICD-10-CM

## 2025-06-25 DIAGNOSIS — F63.81 INTERMITTENT EXPLOSIVE DISORDER: ICD-10-CM

## (undated) DEVICE — DRAPE SHEET REV FOLD 3/4 9349

## (undated) DEVICE — DAVINCI XI GRASPER ENDOWRIST PROGRASP 470093

## (undated) DEVICE — GOWN IMPERVIOUS SPECIALTY XLG/XLONG 32474

## (undated) DEVICE — PREP CHLORAPREP 26ML TINTED ORANGE  260815

## (undated) DEVICE — DAVINCI XI SEAL UNIVERSAL 5-8MM 470361

## (undated) DEVICE — PACK LAP CHOLE SLC15LCFSD

## (undated) DEVICE — NDL INSUFFLATION 13GA 120MM C2201

## (undated) DEVICE — DAVINCI XI MONOPOLAR SCISSORS HOT SHEARS 8MM 470179

## (undated) DEVICE — GLOVE GAMMEX DERMAPRENE ULTRA SZ 8 LF 8516

## (undated) DEVICE — SU VICRYL 4-0 PS-2 18" UND J496H

## (undated) DEVICE — GLOVE PROTEXIS W/NEU-THERA 7.5  2D73TE75

## (undated) DEVICE — ESU GROUND PAD UNIVERSAL W/O CORD

## (undated) DEVICE — DAVINCI XI OBTURATOR BLADELESS 8MM 470359

## (undated) DEVICE — LIGHT HANDLE X2

## (undated) DEVICE — SU STRATAFIX PDS PLUS 3-0 SPIRAL SH 15CM SXPP1B420

## (undated) DEVICE — DAVINCI XI DRAPE COLUMN 470341

## (undated) DEVICE — DAVINCI XI NDL DRIVER LARGE 470006

## (undated) DEVICE — DAVINCI HOT SHEARS TIP COVER  400180

## (undated) DEVICE — DAVINCI XI DRAPE ARM 470015

## (undated) DEVICE — SYSTEM CLEARIFY VISUALIZATION 21-345

## (undated) DEVICE — LINEN TOWEL PACK X5 5464

## (undated) DEVICE — SOL WATER IRRIG 1000ML BOTTLE 2F7114

## (undated) DEVICE — LUBRICANT INST ELECTROLUBE EL101

## (undated) RX ORDER — KETOROLAC TROMETHAMINE 30 MG/ML
INJECTION, SOLUTION INTRAMUSCULAR; INTRAVENOUS
Status: DISPENSED
Start: 2021-08-18

## (undated) RX ORDER — CEFAZOLIN SODIUM 2 G/100ML
INJECTION, SOLUTION INTRAVENOUS
Status: DISPENSED
Start: 2021-08-18

## (undated) RX ORDER — PROPOFOL 10 MG/ML
INJECTION, EMULSION INTRAVENOUS
Status: DISPENSED
Start: 2021-08-18

## (undated) RX ORDER — FENTANYL CITRATE 50 UG/ML
INJECTION, SOLUTION INTRAMUSCULAR; INTRAVENOUS
Status: DISPENSED
Start: 2021-08-18

## (undated) RX ORDER — HYDROCODONE BITARTRATE AND ACETAMINOPHEN 5; 325 MG/1; MG/1
TABLET ORAL
Status: DISPENSED
Start: 2021-08-18

## (undated) RX ORDER — BUPIVACAINE HYDROCHLORIDE AND EPINEPHRINE 2.5; 5 MG/ML; UG/ML
INJECTION, SOLUTION EPIDURAL; INFILTRATION; INTRACAUDAL; PERINEURAL
Status: DISPENSED
Start: 2021-08-18

## (undated) RX ORDER — HYDROMORPHONE HYDROCHLORIDE 1 MG/ML
INJECTION, SOLUTION INTRAMUSCULAR; INTRAVENOUS; SUBCUTANEOUS
Status: DISPENSED
Start: 2021-08-18

## (undated) RX ORDER — FENTANYL CITRATE 50 UG/ML
INJECTION, SOLUTION INTRAMUSCULAR; INTRAVENOUS
Status: DISPENSED
Start: 2024-05-03